# Patient Record
Sex: MALE | Race: WHITE | Employment: FULL TIME | URBAN - METROPOLITAN AREA
[De-identification: names, ages, dates, MRNs, and addresses within clinical notes are randomized per-mention and may not be internally consistent; named-entity substitution may affect disease eponyms.]

---

## 2021-04-20 ENCOUNTER — HOSPITAL ENCOUNTER (INPATIENT)
Age: 51
LOS: 16 days | Discharge: HOME | DRG: 177 | End: 2021-05-06
Attending: INTERNAL MEDICINE | Admitting: FAMILY MEDICINE
Payer: COMMERCIAL

## 2021-04-20 ENCOUNTER — APPOINTMENT (OUTPATIENT)
Dept: CT IMAGING | Age: 51
DRG: 177 | End: 2021-04-20
Attending: INTERNAL MEDICINE
Payer: COMMERCIAL

## 2021-04-20 ENCOUNTER — APPOINTMENT (OUTPATIENT)
Dept: GENERAL RADIOLOGY | Age: 51
End: 2021-04-20
Attending: STUDENT IN AN ORGANIZED HEALTH CARE EDUCATION/TRAINING PROGRAM
Payer: COMMERCIAL

## 2021-04-20 ENCOUNTER — HOSPITAL ENCOUNTER (EMERGENCY)
Age: 51
Discharge: ANOTHER HOSPITAL | End: 2021-04-20
Attending: STUDENT IN AN ORGANIZED HEALTH CARE EDUCATION/TRAINING PROGRAM
Payer: COMMERCIAL

## 2021-04-20 VITALS
TEMPERATURE: 98.5 F | OXYGEN SATURATION: 96 % | BODY MASS INDEX: 45.1 KG/M2 | WEIGHT: 315 LBS | HEIGHT: 70 IN | HEART RATE: 72 BPM | RESPIRATION RATE: 24 BRPM | DIASTOLIC BLOOD PRESSURE: 54 MMHG | SYSTOLIC BLOOD PRESSURE: 113 MMHG

## 2021-04-20 DIAGNOSIS — J12.82 PNEUMONIA DUE TO COVID-19 VIRUS: Primary | ICD-10-CM

## 2021-04-20 DIAGNOSIS — U07.1 PNEUMONIA DUE TO COVID-19 VIRUS: Primary | ICD-10-CM

## 2021-04-20 DIAGNOSIS — R09.02 HYPOXIA: ICD-10-CM

## 2021-04-20 PROBLEM — J96.90 RESPIRATORY FAILURE (HCC): Status: ACTIVE | Noted: 2021-04-20

## 2021-04-20 LAB
ALBUMIN SERPL-MCNC: 2.6 G/DL (ref 3.5–5)
ALBUMIN/GLOB SERPL: 0.6 {RATIO} (ref 1.1–2.2)
ALP SERPL-CCNC: 98 U/L (ref 45–117)
ALT SERPL-CCNC: 50 U/L (ref 12–78)
ANION GAP SERPL CALC-SCNC: 8 MMOL/L (ref 5–15)
APTT PPP: 31.7 SEC (ref 22.1–31)
ARTERIAL PATENCY WRIST A: YES
AST SERPL W P-5'-P-CCNC: 54 U/L (ref 15–37)
ATRIAL RATE: 60 BPM
BASE EXCESS BLD CALC-SCNC: 5 MMOL/L
BASOPHILS # BLD: 0 K/UL (ref 0–0.1)
BASOPHILS NFR BLD: 0 % (ref 0–1)
BDY SITE: ABNORMAL
BILIRUB SERPL-MCNC: 0.5 MG/DL (ref 0.2–1)
BNP SERPL-MCNC: 25 PG/ML
BUN SERPL-MCNC: 27 MG/DL (ref 6–20)
BUN/CREAT SERPL: 19 (ref 12–20)
CA-I BLD-MCNC: 8.7 MG/DL (ref 8.5–10.1)
CA-I BLD-SCNC: 1.09 MMOL/L (ref 1.12–1.32)
CALCULATED P AXIS, ECG09: 33 DEGREES
CALCULATED R AXIS, ECG10: 16 DEGREES
CALCULATED T AXIS, ECG11: 51 DEGREES
CHLORIDE SERPL-SCNC: 102 MMOL/L (ref 97–108)
CO2 SERPL-SCNC: 28 MMOL/L (ref 21–32)
CREAT SERPL-MCNC: 1.41 MG/DL (ref 0.7–1.3)
CRP SERPL HS-MCNC: >9.5 MG/L
CRP SERPL-MCNC: 17.8 MG/DL (ref 0–0.6)
D DIMER PPP FEU-MCNC: 0.61 MG/L FEU (ref 0–0.65)
D DIMER PPP FEU-MCNC: 0.79 UG/ML(FEU)
DIAGNOSIS, 93000: NORMAL
DIFFERENTIAL METHOD BLD: ABNORMAL
EOSINOPHIL # BLD: 0 K/UL (ref 0–0.4)
EOSINOPHIL NFR BLD: 0 % (ref 0–7)
ERYTHROCYTE [DISTWIDTH] IN BLOOD BY AUTOMATED COUNT: 13.9 % (ref 11.5–14.5)
FIBRINOGEN PPP-MCNC: >800 MG/DL (ref 200–475)
GAS FLOW.O2 O2 DELIVERY SYS: ABNORMAL L/MIN
GAS FLOW.O2 SETTING OXYMISER: 6.5 L/M
GLOBULIN SER CALC-MCNC: 4.5 G/DL (ref 2–4)
GLUCOSE SERPL-MCNC: 110 MG/DL (ref 65–100)
HCO3 BLD-SCNC: 29.7 MMOL/L (ref 22–26)
HCT VFR BLD AUTO: 41 % (ref 36.6–50.3)
HGB BLD-MCNC: 14.2 G/DL (ref 12.1–17)
IMM GRANULOCYTES # BLD AUTO: 0 K/UL (ref 0–0.04)
IMM GRANULOCYTES NFR BLD AUTO: 0 % (ref 0–0.5)
INR PPP: 1 (ref 0.9–1.1)
LACTATE SERPL-SCNC: 0.9 MMOL/L (ref 0.4–2)
LACTATE SERPL-SCNC: 1.8 MMOL/L (ref 0.4–2)
LDH SERPL L TO P-CCNC: 621 U/L (ref 85–241)
LYMPHOCYTES # BLD: 0.4 K/UL (ref 0.8–3.5)
LYMPHOCYTES NFR BLD: 6 % (ref 12–49)
MAGNESIUM SERPL-MCNC: 2.9 MG/DL (ref 1.6–2.4)
MCH RBC QN AUTO: 30 PG (ref 26–34)
MCHC RBC AUTO-ENTMCNC: 34.6 G/DL (ref 30–36.5)
MCV RBC AUTO: 86.5 FL (ref 80–99)
MONOCYTES # BLD: 0.2 K/UL (ref 0–1)
MONOCYTES NFR BLD: 2 % (ref 5–13)
NEUTS SEG # BLD: 6.2 K/UL (ref 1.8–8)
NEUTS SEG NFR BLD: 92 % (ref 32–75)
P-R INTERVAL, ECG05: 114 MS
PCO2 BLD: 44.1 MMHG (ref 35–45)
PH BLD: 7.44 [PH] (ref 7.35–7.45)
PLATELET # BLD AUTO: 202 K/UL (ref 150–400)
PMV BLD AUTO: 10.5 FL (ref 8.9–12.9)
PO2 BLD: 65 MMHG (ref 80–100)
POTASSIUM SERPL-SCNC: 3.3 MMOL/L (ref 3.5–5.1)
PROCALCITONIN SERPL-MCNC: 0.18 NG/ML
PROT SERPL-MCNC: 7.1 G/DL (ref 6.4–8.2)
PROTHROMBIN TIME: 10.4 SEC (ref 9–11.1)
Q-T INTERVAL, ECG07: 428 MS
QRS DURATION, ECG06: 90 MS
QTC CALCULATION (BEZET), ECG08: 428 MS
RBC # BLD AUTO: 4.74 M/UL (ref 4.1–5.7)
SAO2 % BLD: 93 % (ref 92–97)
SARS-COV-2, COV2: NORMAL
SODIUM SERPL-SCNC: 138 MMOL/L (ref 136–145)
SPECIMEN TYPE: ABNORMAL
THERAPEUTIC RANGE,PTTT: ABNORMAL SECS (ref 58–77)
TOTAL RESP. RATE, ITRR: 16
TROPONIN I SERPL-MCNC: <0.05 NG/ML
TROPONIN I SERPL-MCNC: <0.05 NG/ML
VENTRICULAR RATE, ECG03: 60 BPM
WBC # BLD AUTO: 6.8 K/UL (ref 4.1–11.1)

## 2021-04-20 PROCEDURE — 83615 LACTATE (LD) (LDH) ENZYME: CPT

## 2021-04-20 PROCEDURE — 74011250636 HC RX REV CODE- 250/636: Performed by: NURSE PRACTITIONER

## 2021-04-20 PROCEDURE — 36600 WITHDRAWAL OF ARTERIAL BLOOD: CPT

## 2021-04-20 PROCEDURE — 82803 BLOOD GASES ANY COMBINATION: CPT

## 2021-04-20 PROCEDURE — 36415 COLL VENOUS BLD VENIPUNCTURE: CPT

## 2021-04-20 PROCEDURE — 77010033678 HC OXYGEN DAILY

## 2021-04-20 PROCEDURE — 85384 FIBRINOGEN ACTIVITY: CPT

## 2021-04-20 PROCEDURE — 94660 CPAP INITIATION&MGMT: CPT

## 2021-04-20 PROCEDURE — 83735 ASSAY OF MAGNESIUM: CPT

## 2021-04-20 PROCEDURE — 94760 N-INVAS EAR/PLS OXIMETRY 1: CPT

## 2021-04-20 PROCEDURE — 86140 C-REACTIVE PROTEIN: CPT

## 2021-04-20 PROCEDURE — 71250 CT THORAX DX C-: CPT

## 2021-04-20 PROCEDURE — 74011250637 HC RX REV CODE- 250/637: Performed by: NURSE PRACTITIONER

## 2021-04-20 PROCEDURE — U0005 INFEC AGEN DETEC AMPLI PROBE: HCPCS

## 2021-04-20 PROCEDURE — 84484 ASSAY OF TROPONIN QUANT: CPT

## 2021-04-20 PROCEDURE — 85610 PROTHROMBIN TIME: CPT

## 2021-04-20 PROCEDURE — 83605 ASSAY OF LACTIC ACID: CPT

## 2021-04-20 PROCEDURE — 84145 PROCALCITONIN (PCT): CPT

## 2021-04-20 PROCEDURE — 93005 ELECTROCARDIOGRAM TRACING: CPT

## 2021-04-20 PROCEDURE — 85379 FIBRIN DEGRADATION QUANT: CPT

## 2021-04-20 PROCEDURE — 65660000000 HC RM CCU STEPDOWN

## 2021-04-20 PROCEDURE — 71045 X-RAY EXAM CHEST 1 VIEW: CPT

## 2021-04-20 PROCEDURE — 87040 BLOOD CULTURE FOR BACTERIA: CPT

## 2021-04-20 PROCEDURE — 74011000258 HC RX REV CODE- 258: Performed by: NURSE PRACTITIONER

## 2021-04-20 PROCEDURE — 86141 C-REACTIVE PROTEIN HS: CPT

## 2021-04-20 PROCEDURE — 80053 COMPREHEN METABOLIC PANEL: CPT

## 2021-04-20 PROCEDURE — 85025 COMPLETE CBC W/AUTO DIFF WBC: CPT

## 2021-04-20 PROCEDURE — 85730 THROMBOPLASTIN TIME PARTIAL: CPT

## 2021-04-20 PROCEDURE — 83880 ASSAY OF NATRIURETIC PEPTIDE: CPT

## 2021-04-20 PROCEDURE — 99285 EMERGENCY DEPT VISIT HI MDM: CPT

## 2021-04-20 RX ORDER — GUAIFENESIN/DEXTROMETHORPHAN 100-10MG/5
5 SYRUP ORAL
Status: DISCONTINUED | OUTPATIENT
Start: 2021-04-20 | End: 2021-05-06 | Stop reason: HOSPADM

## 2021-04-20 RX ORDER — ENOXAPARIN SODIUM 100 MG/ML
40 INJECTION SUBCUTANEOUS EVERY 12 HOURS
Status: DISCONTINUED | OUTPATIENT
Start: 2021-04-20 | End: 2021-05-06 | Stop reason: HOSPADM

## 2021-04-20 RX ORDER — ACETAMINOPHEN 325 MG/1
650 TABLET ORAL
Status: DISCONTINUED | OUTPATIENT
Start: 2021-04-20 | End: 2021-04-21 | Stop reason: SDUPTHER

## 2021-04-20 RX ORDER — ZINC SULFATE 50(220)MG
1 CAPSULE ORAL DAILY
Status: DISCONTINUED | OUTPATIENT
Start: 2021-04-21 | End: 2021-05-06 | Stop reason: HOSPADM

## 2021-04-20 RX ORDER — ALBUTEROL SULFATE 90 UG/1
2 AEROSOL, METERED RESPIRATORY (INHALATION)
Status: DISCONTINUED | OUTPATIENT
Start: 2021-04-20 | End: 2021-04-22

## 2021-04-20 RX ORDER — SODIUM CHLORIDE 0.9 % (FLUSH) 0.9 %
5-40 SYRINGE (ML) INJECTION AS NEEDED
Status: DISCONTINUED | OUTPATIENT
Start: 2021-04-20 | End: 2021-05-06 | Stop reason: HOSPADM

## 2021-04-20 RX ORDER — ACETAMINOPHEN 325 MG/1
650 TABLET ORAL
Status: DISCONTINUED | OUTPATIENT
Start: 2021-04-20 | End: 2021-05-06 | Stop reason: HOSPADM

## 2021-04-20 RX ORDER — SODIUM CHLORIDE 0.9 % (FLUSH) 0.9 %
5-40 SYRINGE (ML) INJECTION EVERY 8 HOURS
Status: DISCONTINUED | OUTPATIENT
Start: 2021-04-20 | End: 2021-05-06 | Stop reason: HOSPADM

## 2021-04-20 RX ORDER — MELATONIN
2000 DAILY
Status: DISCONTINUED | OUTPATIENT
Start: 2021-04-21 | End: 2021-05-06 | Stop reason: HOSPADM

## 2021-04-20 RX ORDER — ASCORBIC ACID 500 MG
500 TABLET ORAL 2 TIMES DAILY
Status: DISCONTINUED | OUTPATIENT
Start: 2021-04-20 | End: 2021-05-06 | Stop reason: HOSPADM

## 2021-04-20 RX ORDER — ACETAMINOPHEN 650 MG/1
650 SUPPOSITORY RECTAL
Status: DISCONTINUED | OUTPATIENT
Start: 2021-04-20 | End: 2021-05-06 | Stop reason: HOSPADM

## 2021-04-20 RX ADMIN — OXYCODONE HYDROCHLORIDE AND ACETAMINOPHEN 500 MG: 500 TABLET ORAL at 17:58

## 2021-04-20 RX ADMIN — DEXAMETHASONE 6 MG: 4 TABLET ORAL at 17:58

## 2021-04-20 RX ADMIN — ENOXAPARIN SODIUM 40 MG: 100 INJECTION SUBCUTANEOUS at 17:59

## 2021-04-20 RX ADMIN — Medication 10 ML: at 18:00

## 2021-04-20 RX ADMIN — Medication 10 ML: at 22:31

## 2021-04-20 RX ADMIN — CEFTRIAXONE SODIUM 1 G: 1 INJECTION, POWDER, FOR SOLUTION INTRAMUSCULAR; INTRAVENOUS at 19:40

## 2021-04-20 NOTE — CONSULTS
Name: [facility]: [address]   : 1970 Admit Date: 2021   Phone: [phone]  Room: [facility]   PCP: None  MRN: [id number]   Date: 2021  Code: Full Code        HPI:    5:44 PM       History was obtained from patient. I was asked by Ezequiel Thomas MD to see Ysabel Rodriguez in consultation for a chief complaint of shortness of breath. History of Present Illness: 48year old male with past medical history of asthma, allergic rhinitis and morbid obesity who presented to [facility] with increased shortness of breath. Diagnosed with COVID-19 1 week back at [facility]. Last fever 7 days back. Does have cough, shortness of breath and wheezing. On prednisone 20 mg q daily. Data:  Hgb 6.8  Wbc 14  Plt 202  D-dimer 0.79  Cr 1.41  Trop < 0.05  nt pro bnp 25  CRP pending. CXR - bilateral infiltrates. Past Medical History:   Diagnosis Date    Asthma     High cholesterol     Hypertension     Sleep apnea        Past Surgical History:   Procedure Laterality Date    HX HERNIA REPAIR      HX OPEN REDUCTION INTERNAL FIXATION      rt leg       No family history on file.     Social History     Tobacco Use    Smoking status: Never Smoker    Smokeless tobacco: Current User   Substance Use Topics    Alcohol use: Never     Frequency: Never       Allergies   Allergen Reactions    Penicillins Unknown (comments)       Current Facility-Administered Medications   Medication Dose Route Frequency    enoxaparin (LOVENOX) injection 40 mg  40 mg SubCUTAneous Q12H    acetaminophen (TYLENOL) tablet 650 mg  650 mg Oral Q6H PRN    Or    acetaminophen (TYLENOL) suppository 650 mg  650 mg Rectal Q6H PRN    guaiFENesin-dextromethorphan (ROBITUSSIN DM) 100-10 mg/5 mL syrup 5 mL  5 mL Oral Q4H PRN    dexAMETHasone (DECADRON) tablet 6 mg  6 mg Oral DAILY    [START ON 2021] cholecalciferol (VITAMIN D3) (1000 Units /25 mcg) tablet 2,000 Units  2,000 Units Oral DAILY    ascorbic acid (vitamin C) (VITAMIN C) tablet 500 mg  500 mg Oral BID    [START ON 4/21/2021] zinc sulfate (ZINCATE) 50 mg zinc (220 mg) capsule 1 Cap  1 Cap Oral DAILY    sodium chloride (NS) flush 5-40 mL  5-40 mL IntraVENous Q8H    sodium chloride (NS) flush 5-40 mL  5-40 mL IntraVENous PRN    cefTRIAXone (ROCEPHIN) 1 g in 0.9% sodium chloride (MBP/ADV) 50 mL MBP  1 g IntraVENous Q24H    azithromycin (ZITHROMAX) 500 mg in 0.9% sodium chloride 250 mL (VIAL-MATE)  500 mg IntraVENous Q24H    acetaminophen (TYLENOL) tablet 650 mg  650 mg Oral Q4H PRN    albuterol (PROVENTIL HFA, VENTOLIN HFA, PROAIR HFA) inhaler 2 Puff  2 Puff Inhalation Q4H PRN         REVIEW OF SYSTEMS   Negative except as stated in the HPI. Physical Exam:   Visit Vitals  BP 99/64 (BP Patient Position: Supine)   Pulse 66   Temp 98.2 °F (36.8 °C)   Resp 26   SpO2 95%     Patient has COVID-19 infection and examination was deferred. Only inspection was performed. General:  Alert, cooperative, no distress. Head:  Normocephalic. Eyes:  Conjunctivae/corneas clear. Nose: Nares normal. Septum midline. Extremities: Extremities normal, atraumatic, no cyanosis or edema. Skin: Skin color, texture, turgor normal. No rashes or lesions       Neurologic: Grossly nonfocal       Lab Results   Component Value Date/Time    Sodium 138 04/20/2021 10:31 AM    Potassium 3.3 (L) 04/20/2021 10:31 AM    Chloride 102 04/20/2021 10:31 AM    CO2 28 04/20/2021 10:31 AM    BUN 27 (H) 04/20/2021 10:31 AM    Creatinine 1.41 (H) 04/20/2021 10:31 AM    Glucose 110 (H) 04/20/2021 10:31 AM    Calcium 8.7 04/20/2021 10:31 AM    Lactic acid 1.8 04/20/2021 10:31 AM       Lab Results   Component Value Date/Time    WBC 6.8 04/20/2021 10:31 AM    HGB 14.2 04/20/2021 10:31 AM    PLATELET 474 13/69/1825 10:31 AM    MCV 86.5 04/20/2021 10:31 AM       Lab Results   Component Value Date/Time    Alk.  phosphatase 98 04/20/2021 10:31 AM    Protein, total 7.1 04/20/2021 10:31 AM    Albumin 2.6 (L) 04/20/2021 10:31 AM    Globulin 4.5 (H) 04/20/2021 10:31 AM       Lab Results   Component Value Date/Time    PHI 7.44 04/20/2021 04:50 PM    PCO2I 44.1 04/20/2021 04:50 PM    PO2I 65 (L) 04/20/2021 04:50 PM    HCO3I 29.7 (H) 04/20/2021 04:50 PM       Lab Results   Component Value Date/Time    Troponin-I, Qt. <0.05 04/20/2021 10:31 AM        IMPRESSION:  ===========  -COVID-19.  -COVID-19 pneumonia. -hypoxemic respiratory failure.  -asthma.  -seasonal allergies.  -Obesity. PLAN:  =====  -CRP / d-dimer daily.  -Decadron 6 mg q daily.  -dvt ppx.  -HRCT Chest.  -O2 supplementation.  -sleep prone > 3 hours. -PCT - if normal d/c abx. Thank you for the consult.     Mary Jo Leone MD

## 2021-04-20 NOTE — ED TRIAGE NOTES
COVID+ x 1 wk, pt having episodes of hypoxia since dx. Pt becoming increasingly SOB with cough, fever, fatigue. This am spo2 74% on RA. 85% on 6L. Placed on 10LPM with improvement to 96%.  PT HAS ANTHEM AND CANT BE ADMITTED AT [facility]

## 2021-04-20 NOTE — ED NOTES
Patient belongings black cell phone, gray cell phone , black wallet, glasses, and black shirt begged and with patient.  Handed bagged belongings to [facility]

## 2021-04-20 NOTE — H&P
[address] [facility]  History and Physical    Primary Care Provider: None  Date of Service:  4/20/2021    Subjective:     River Jain is a 48 y.o. male with past medical history of asthma, hypertension, hyperlipidemia and morbid obesity presented to [facility] ED this morning with increased shortness of breath. Patient states over the past 3 weeks his allergies have been bothering him, normally he is able to take steriods and then feel better but 1 week ago he had increased shortness of breath and went to the hospital.  At that time he was diagnosed with COVID-19 and since then his breathing has become worse. This morning when he woke up he felt like he could not breathe and went to the emergency department. Currently he is on 6 L nasal cannula and does not appear to be in distress. Patient was transferred to [facility] for further treatment and hospitalist consulted for admission. Denies syncope, dizziness, chest pain or tightness, nausea, vomiting or diarrhea. Review of Systems:    A comprehensive review of systems was negative except for that written in the History of Present Illness. Past Medical History:   Diagnosis Date    Asthma     High cholesterol     Hypertension     Sleep apnea       Past Surgical History:   Procedure Laterality Date    HX HERNIA REPAIR      HX OPEN REDUCTION INTERNAL FIXATION      rt leg     Prior to Admission medications    Not on File     Allergies   Allergen Reactions    Penicillins Unknown (comments)      No family history on file. SOCIAL HISTORY:  Patient resides at Home. Patient ambulates with Independent .    Smoking history: Denies   Alcohol history: Denies   Drug history: Denies        Objective:       Physical Exam:   Visit Vitals  BP 99/64 (BP Patient Position: Supine)   Pulse 66   Temp 98.2 °F (36.8 °C)   Resp 26   SpO2 95%     General appearance: alert, cooperative, no distress, appears stated age  Lungs: slight expiratory wheezing in right lobes, left lobes clear   Heart: regular rate and rhythm, S1, S2 normal, no murmur, click, rub or gallop  Abdomen: soft, non-tender. Bowel sounds normal. No masses,  no organomegaly  Extremities: extremities normal, atraumatic, no cyanosis or edema  Pulses: 2+ and symmetric  Skin: Skin color, texture, turgor normal. No rashes or lesions  Neurologic: Grossly normal  Cap refill: Brisk, less than 3 seconds  Pulses: 2+, symmetric in all extremities         Data Review: All diagnostic labs and studies have been reviewed. Chest x-ray: IMPRESSION  Patchy bilateral airspace disease may represent multilobar pneumonia in the  appropriate clinical setting. Assessment:     Active Problems:    Respiratory failure ([facility] [address] (4/20/2021)      COVID-19 (4/20/2021)        Plan:     Acute hypoxic respiratory failure secondary to COVID 19   Covid 19- Pneumonia   - Admit to telemetry   - Currently on 6L NC, titrate O2 to keep sats >92%   - Start dexamethasone, vit c, vit d, zinc  - Consult pharmacy for remdisivir- Per guidelines patient does not meet criteria. Consult ID if patient's condition warrants remdisivir  - Covid 19 PCR pending   - ABG and Labs pending    - Consult pulmonary   - Follow blood cultures   - IV abx     Hypertension   - Stable   - Consult pharmacy for medication rec     HLD   - Lipid panel pending   - Pharmacy for med rec. Morbid Obesity   - BMI 33.81 kg/m²  - Follow up outpatient for weight loss management. DVT prophylaxis: Lovenox  CODE STATUS: Full code     FUNCTIONAL STATUS PRIOR TO HOSPITALIZATION (including history of recent falls): Independent.       Signed By: Julianne Mcgovern NP     April 20, 2021

## 2021-04-20 NOTE — ED PROVIDER NOTES
EMERGENCY DEPARTMENT HISTORY AND PHYSICAL EXAM      Date: 4/20/2021  Patient Name: Cammie Iverson    History of Presenting Illness     Chief Complaint   Patient presents with    Shortness of Breath    Positive For Covid-19       History Provided By: Patient    HPI: Cammie Iverson, 48 y.o. male with a past medical history significant asthma presents to the ED with cc of shortness of breath. Patient was recently diagnosed with COVID-19, approximately 6 days ago, since that time has noted worsening shortness of breath, patient was discharged home on steroids, with pulse ox, family have noted pulse ox to be ranging from the 70s to 80s today. Patient has complained of worsening chest tightness, dyspnea on exertion. Denies any chest pain denies any abdominal pain nausea vomiting. Patient has noted to be febrile every day since diagnosis. There are no other complaints, changes, or physical findings at this time.     PCP: None    Facility-Administered Medications Ordered in Other Encounters   Medication Dose Route Frequency Provider Last Rate Last Admin    enoxaparin (LOVENOX) injection 40 mg  40 mg SubCUTAneous Q12H Izabela Mcclelland NP   40 mg at 04/20/21 1759    acetaminophen (TYLENOL) tablet 650 mg  650 mg Oral Q6H PRN Bishop Leavitt NP        Or    acetaminophen (TYLENOL) suppository 650 mg  650 mg Rectal Q6H PRN Jaxson Mcclelland NP        guaiFENesin-dextromethorphan (ROBITUSSIN DM) 100-10 mg/5 mL syrup 5 mL  5 mL Oral Q4H PRN Jaxson Mcclelland NP        dexAMETHasone (DECADRON) tablet 6 mg  6 mg Oral DAILY Izabela Mcclelland NP   6 mg at 04/20/21 1758    [START ON 4/21/2021] cholecalciferol (VITAMIN D3) (1000 Units /25 mcg) tablet 2,000 Units  2,000 Units Oral DAILY Jaxson Mcclelland NP        ascorbic acid (vitamin C) (VITAMIN C) tablet 500 mg  500 mg Oral BID Izabela Rodriguez NP   500 mg at 04/20/21 1758    [START ON 4/21/2021] zinc sulfate (ZINCATE) 50 mg zinc (220 mg) capsule 1 Cap  1 Cap Oral DAILY Earl Mcclelland, NP        sodium chloride (NS) flush 5-40 mL  5-40 mL IntraVENous Q8H Izabela Mcclelland NP   10 mL at 04/20/21 1800    sodium chloride (NS) flush 5-40 mL  5-40 mL IntraVENous PRN Jasmyn Maloney NP        cefTRIAXone (ROCEPHIN) 1 g in 0.9% sodium chloride (MBP/ADV) 50 mL MBP  1 g IntraVENous Q24H Izabela Mcclelland  mL/hr at 04/20/21 1940 1 g at 04/20/21 1940    azithromycin (ZITHROMAX) 500 mg in 0.9% sodium chloride 250 mL (VIAL-MATE)  500 mg IntraVENous Q24H Izabeal Mcclelland NP        acetaminophen (TYLENOL) tablet 650 mg  650 mg Oral Q4H PRN Earl Mcclelland NP        albuterol (PROVENTIL HFA, VENTOLIN HFA, PROAIR HFA) inhaler 2 Puff  2 Puff Inhalation Q4H PRN Earl Baca NP           Past History     Past Medical History:  Past Medical History:   Diagnosis Date    Asthma     High cholesterol     Hypertension     Sleep apnea        Past Surgical History:  Past Surgical History:   Procedure Laterality Date    HX HERNIA REPAIR      HX OPEN REDUCTION INTERNAL FIXATION      rt leg       Family History:  History reviewed. No pertinent family history. Social History:  Social History     Tobacco Use    Smoking status: Never Smoker    Smokeless tobacco: Current User   Substance Use Topics    Alcohol use: Never     Frequency: Never    Drug use: Not on file       Allergies: Allergies   Allergen Reactions    Penicillins Unknown (comments)         Review of Systems     Review of Systems   Constitutional: Positive for chills, fatigue and fever. Negative for activity change. HENT: Negative for congestion and sore throat. Eyes: Negative for photophobia and visual disturbance. Respiratory: Positive for cough, chest tightness and shortness of breath. Negative for apnea. Cardiovascular: Negative for chest pain, palpitations and leg swelling. Gastrointestinal: Negative for abdominal pain, blood in stool, nausea and vomiting.    Genitourinary: Negative for dysuria. Musculoskeletal: Negative for arthralgias and back pain. Skin: Negative for color change. Neurological: Negative for dizziness, weakness, numbness and headaches. Psychiatric/Behavioral: Negative for confusion. Physical Exam     Physical Exam  Vitals signs and nursing note reviewed. Constitutional:       General: He is not in acute distress. Appearance: He is well-developed. He is obese. He is not toxic-appearing. HENT:      Head: Normocephalic and atraumatic. Eyes:      Extraocular Movements: Extraocular movements intact. Pupils: Pupils are equal, round, and reactive to light. Neck:      Musculoskeletal: Neck supple. Cardiovascular:      Rate and Rhythm: Normal rate and regular rhythm. Pulses:           Radial pulses are 2+ on the right side and 2+ on the left side. Dorsalis pedis pulses are 2+ on the right side and 2+ on the left side. Heart sounds: Normal heart sounds. Pulmonary:      Effort: Pulmonary effort is normal.      Breath sounds: Normal breath sounds. Chest:      Chest wall: No tenderness. Abdominal:      General: Bowel sounds are normal.      Palpations: Abdomen is soft. Tenderness: There is no abdominal tenderness. There is no guarding. Musculoskeletal:      Right lower leg: No edema. Left lower leg: No edema. Skin:     General: Skin is warm and dry. Capillary Refill: Capillary refill takes less than 2 seconds. Neurological:      General: No focal deficit present. Mental Status: He is alert and oriented to person, place, and time.          Diagnostic Study Results     Labs -     Recent Results (from the past 12 hour(s))   D DIMER    Collection Time: 04/20/21 10:30 AM   Result Value Ref Range    D DIMER 0.79 (H) <0.50 ug/ml(FEU)   CBC WITH AUTOMATED DIFF    Collection Time: 04/20/21 10:31 AM   Result Value Ref Range    WBC 6.8 4.1 - 11.1 K/uL    RBC 4.74 4.10 - 5.70 M/uL    HGB 14.2 12.1 - 17.0 g/dL    HCT 41.0 36.6 - 50.3 %    MCV 86.5 80.0 - 99.0 FL    MCH 30.0 26.0 - 34.0 PG    MCHC 34.6 30.0 - 36.5 g/dL    RDW 13.9 11.5 - 14.5 %    PLATELET 854 754 - 472 K/uL    MPV 10.5 8.9 - 12.9 FL    NEUTROPHILS 92 (H) 32 - 75 %    LYMPHOCYTES 6 (L) 12 - 49 %    MONOCYTES 2 (L) 5 - 13 %    EOSINOPHILS 0 0 - 7 %    BASOPHILS 0 0 - 1 %    IMMATURE GRANULOCYTES 0 0.0 - 0.5 %    ABS. NEUTROPHILS 6.2 1.8 - 8.0 K/UL    ABS. LYMPHOCYTES 0.4 (L) 0.8 - 3.5 K/UL    ABS. MONOCYTES 0.2 0.0 - 1.0 K/UL    ABS. EOSINOPHILS 0.0 0.0 - 0.4 K/UL    ABS. BASOPHILS 0.0 0.0 - 0.1 K/UL    ABS. IMM. GRANS. 0.0 0.00 - 0.04 K/UL    DF AUTOMATED     METABOLIC PANEL, COMPREHENSIVE    Collection Time: 04/20/21 10:31 AM   Result Value Ref Range    Sodium 138 136 - 145 mmol/L    Potassium 3.3 (L) 3.5 - 5.1 mmol/L    Chloride 102 97 - 108 mmol/L    CO2 28 21 - 32 mmol/L    Anion gap 8 5 - 15 mmol/L    Glucose 110 (H) 65 - 100 mg/dL    BUN 27 (H) 6 - 20 mg/dL    Creatinine 1.41 (H) 0.70 - 1.30 mg/dL    BUN/Creatinine ratio 19 12 - 20      GFR est AA >60 >60 ml/min/1.73m2    GFR est non-AA 53 (L) >60 ml/min/1.73m2    Calcium 8.7 8.5 - 10.1 mg/dL    Bilirubin, total 0.5 0.2 - 1.0 mg/dL    AST (SGOT) 54 (H) 15 - 37 U/L    ALT (SGPT) 50 12 - 78 U/L    Alk.  phosphatase 98 45 - 117 U/L    Protein, total 7.1 6.4 - 8.2 g/dL    Albumin 2.6 (L) 3.5 - 5.0 g/dL    Globulin 4.5 (H) 2.0 - 4.0 g/dL    A-G Ratio 0.6 (L) 1.1 - 2.2     LACTIC ACID    Collection Time: 04/20/21 10:31 AM   Result Value Ref Range    Lactic acid 1.8 0.4 - 2.0 mmol/L   TROPONIN I    Collection Time: 04/20/21 10:31 AM   Result Value Ref Range    Troponin-I, Qt. <0.05 <0.05 ng/mL   BNP    Collection Time: 04/20/21 10:31 AM   Result Value Ref Range    NT pro-BNP 25 <125 pg/mL   CRP, HIGH SENSITIVITY    Collection Time: 04/20/21 10:31 AM   Result Value Ref Range    CRP, High sensitivity >9.5 mg/L   POC EG7    Collection Time: 04/20/21  4:50 PM   Result Value Ref Range    Calcium, ionized (POC) 1.09 (L) 1.12 - 1.32 mmol/L    pH (POC) 7.44 7.35 - 7.45      pCO2 (POC) 44.1 35.0 - 45.0 MMHG    pO2 (POC) 65 (L) 80 - 100 MMHG    HCO3 (POC) 29.7 (H) 22 - 26 MMOL/L    Base excess (POC) 5 mmol/L    sO2 (POC) 93 92 - 97 %    Site LEFT RADIAL      Device: NASAL CANNULA      Flow rate (POC) 6.5 L/M    Allens test (POC) YES      Specimen type (POC) ARTERIAL      Total resp.  rate 16     EKG, 12 LEAD, INITIAL    Collection Time: 04/20/21  5:04 PM   Result Value Ref Range    Ventricular Rate 60 BPM    Atrial Rate 60 BPM    P-R Interval 114 ms    QRS Duration 90 ms    Q-T Interval 428 ms    QTC Calculation (Bezet) 428 ms    Calculated P Axis 33 degrees    Calculated R Axis 16 degrees    Calculated T Axis 51 degrees    Diagnosis       Normal sinus rhythm  Nonspecific T wave abnormality  No previous ECGs available  Confirmed by Miroslava Montalvo M.D., Anastasia Ike ([id number]) on 4/20/2021 6:06:00 PM     MAGNESIUM    Collection Time: 04/20/21  7:12 PM   Result Value Ref Range    Magnesium 2.9 (H) 1.6 - 2.4 mg/dL   PROTHROMBIN TIME + INR    Collection Time: 04/20/21  7:12 PM   Result Value Ref Range    INR 1.0 0.9 - 1.1      Prothrombin time 10.4 9.0 - 11.1 sec   FIBRINOGEN    Collection Time: 04/20/21  7:12 PM   Result Value Ref Range    Fibrinogen >800 (H) 200 - 475 mg/dL   PTT    Collection Time: 04/20/21  7:12 PM   Result Value Ref Range    aPTT 31.7 (H) 22.1 - 31.0 sec    aPTT, therapeutic range     58.0 - 77.0 SECS   PROCALCITONIN    Collection Time: 04/20/21  7:12 PM   Result Value Ref Range    Procalcitonin 0.18 ng/mL   C REACTIVE PROTEIN, QT    Collection Time: 04/20/21  7:12 PM   Result Value Ref Range    C-Reactive protein 17.80 (H) 0.00 - 0.60 mg/dL   D DIMER    Collection Time: 04/20/21  7:12 PM   Result Value Ref Range    D-dimer 0.61 0.00 - 0.65 mg/L FEU   LD    Collection Time: 04/20/21  7:12 PM   Result Value Ref Range     (H) 85 - 241 U/L   TROPONIN I    Collection Time: 04/20/21  7:12 PM   Result Value Ref Range    Troponin-I, Qt. <0.05 <0.05 ng/mL   LACTIC ACID    Collection Time: 04/20/21  7:12 PM   Result Value Ref Range    Lactic acid 0.9 0.4 - 2.0 MMOL/L   SARS-COV-2    Collection Time: 04/20/21  7:48 PM   Result Value Ref Range    SARS-CoV-2 Please find results under separate order         Radiologic Studies -   [unfilled]  CT Results  (Last 48 hours)    None        CXR Results  (Last 48 hours)               04/20/21 1058  XR CHEST PORT Final result    Impression:  Patchy bilateral airspace disease may represent multilobar pneumonia in the   appropriate clinical setting. Radiographic follow-up recommended. Narrative:  Clinical history: Shortness of breath       Comparison: None. Findings:    Single view chest. Cardiac monitoring leads project over the patient. Lung   volumes are adequate. Bilateral patchy perihilar airspace opacities are noted. No pleural effusion or pneumothorax. The cardiac silhouette is magnified due to   AP technique. Thoracic degenerative disc disease. Medical Decision Making and ED Course   I am the first provider for this patient. I reviewed the vital signs, available nursing notes, past medical history, past surgical history, family history and social history. Vital Signs-Reviewed the patient's vital signs.   Patient Vitals for the past 12 hrs:   Temp Pulse Resp BP SpO2   04/20/21 1249  72 24 (!) 113/54 96 %   04/20/21 1129     95 %   04/20/21 1029     92 %   04/20/21 1015     92 %   04/20/21 1011 98.5 °F (36.9 °C)       04/20/21 1009  85 (!) 34 (!) 128/59 (!) 89 %   04/20/21 1000     93 %       EKG interpretation: (Preliminary)  Normal sinus rhythm 89, no ST elevation, normal axis    Records Reviewed: Nursing Notes    The patient presents with cough, shortness of breath with a differential diagnosis of       Provider Notes (Medical Decision Making):   MDM   43-year-old male, past medical history of asthma, obesity, approximately 1 week after being diagnosed with Covid, presents to emergency department for shortness of breath, noted to be hypoxic at home in the 70s. Patient oxygenating at 95-96% on 6 L of oxygen, physical exam shows obese male, in no distress, mild tachypnea 20-30,clear reath sounds    Septic work-up initiated, continue oxygen support, chest x-ray ordered    ED Course:   Initial assessment performed. The patients presenting problems have been discussed, and they are in agreement with the care plan formulated and outlined with them. I have encouraged them to ask questions as they arise throughout their visit. ED Course as of Apr 20 2029   Tue Apr 20, 2021   1201 Patient saturating 92 to 95% on 6 L of oxygen,Patient's lab work resulted, cardiac enzymes are negative x1, CBC shows no leukocytosis, metabolic panel shows mild renal insufficiency creatinine 1.4, BUN 27. Patient's chest x-ray resulted shows diffuse bilateral patchy infiltrates, consistent with COVID-19 pneumonitis. [PZ]   5662 Discussed case with [facility] hospitalist, Dr. Hanna Pierce, accepts patient as transfer for Covid pneumonitis, hypoxia. Transfer center will set up transport. [PZ]   1649 Patient's D-dimer resulted, mildly elevated at 0.79, do not strongly suspect PE, Covid pneumonitis most likely causing hypoxia and symptoms, additionally patient's creatinine slightly elevated 1.4 at this point do not feel that risking renal injury giving CT with IV contrast is warranted as most likely  Covid pneumonitis iscausing hypoxia. [PZ]      ED Course User Index  [PZ] Maurilio Esparza MD         Procedures       Miguel Lemus MD  Procedures         Disposition     Transfer to [facility]          Diagnosis     Clinical Impression:   1. Pneumonia due to COVID-19 virus    2. Hypoxia        Attestations:    Miguel Lemus MD    Please note that this dictation was completed with [employer], the [email or web address] voice recognition software.   Quite often unanticipated grammatical, syntax, homophones, and other interpretive errors are inadvertently transcribed by the computer software. Please disregard these errors. Please excuse any errors that have escaped final proofreading. Thank you.

## 2021-04-21 LAB
25(OH)D3 SERPL-MCNC: 14.4 NG/ML (ref 30–100)
ABO + RH BLD: NORMAL
ALBUMIN SERPL-MCNC: 2.5 G/DL (ref 3.5–5)
ALBUMIN/GLOB SERPL: 0.5 {RATIO} (ref 1.1–2.2)
ALP SERPL-CCNC: 98 U/L (ref 45–117)
ALT SERPL-CCNC: 50 U/L (ref 12–78)
ANION GAP SERPL CALC-SCNC: 7 MMOL/L (ref 5–15)
APPEARANCE UR: CLEAR
AST SERPL-CCNC: 57 U/L (ref 15–37)
ATRIAL RATE: 89 BPM
BACTERIA URNS QL MICRO: NEGATIVE /HPF
BILIRUB DIRECT SERPL-MCNC: 0.2 MG/DL (ref 0–0.2)
BILIRUB SERPL-MCNC: 0.4 MG/DL (ref 0.2–1)
BILIRUB UR QL: NEGATIVE
BLOOD GROUP ANTIBODIES SERPL: NORMAL
BNP SERPL-MCNC: 16 PG/ML
BUN SERPL-MCNC: 30 MG/DL (ref 6–20)
BUN/CREAT SERPL: 29 (ref 12–20)
CALCIUM SERPL-MCNC: 8.9 MG/DL (ref 8.5–10.1)
CALCULATED P AXIS, ECG09: 33 DEGREES
CALCULATED R AXIS, ECG10: 28 DEGREES
CALCULATED T AXIS, ECG11: 113 DEGREES
CHLORIDE SERPL-SCNC: 105 MMOL/L (ref 97–108)
CO2 SERPL-SCNC: 27 MMOL/L (ref 21–32)
COLOR UR: ABNORMAL
COMMENT, HOLDF: NORMAL
COMMENT, HOLDF: NORMAL
CREAT SERPL-MCNC: 1.03 MG/DL (ref 0.7–1.3)
CRP SERPL-MCNC: 12.5 MG/DL (ref 0–0.6)
D DIMER PPP FEU-MCNC: 0.58 MG/L FEU (ref 0–0.65)
DIAGNOSIS, 93000: NORMAL
EPITH CASTS URNS QL MICRO: ABNORMAL /LPF
EST. AVERAGE GLUCOSE BLD GHB EST-MCNC: 120 MG/DL
GLOBULIN SER CALC-MCNC: 4.8 G/DL (ref 2–4)
GLUCOSE BLD STRIP.AUTO-MCNC: 128 MG/DL (ref 65–100)
GLUCOSE BLD STRIP.AUTO-MCNC: 140 MG/DL (ref 65–100)
GLUCOSE SERPL-MCNC: 124 MG/DL (ref 65–100)
GLUCOSE UR STRIP.AUTO-MCNC: NEGATIVE MG/DL
HBA1C MFR BLD: 5.8 % (ref 4–5.6)
HGB UR QL STRIP: NEGATIVE
KETONES UR QL STRIP.AUTO: NEGATIVE MG/DL
LEUKOCYTE ESTERASE UR QL STRIP.AUTO: NEGATIVE
MUCOUS THREADS URNS QL MICRO: ABNORMAL /LPF
NITRITE UR QL STRIP.AUTO: NEGATIVE
P-R INTERVAL, ECG05: 106 MS
PH UR STRIP: 5 [PH] (ref 5–8)
POTASSIUM SERPL-SCNC: 3.7 MMOL/L (ref 3.5–5.1)
PROT SERPL-MCNC: 7.3 G/DL (ref 6.4–8.2)
PROT UR STRIP-MCNC: ABNORMAL MG/DL
Q-T INTERVAL, ECG07: 328 MS
QRS DURATION, ECG06: 88 MS
QTC CALCULATION (BEZET), ECG08: 399 MS
RBC #/AREA URNS HPF: ABNORMAL /HPF (ref 0–5)
SAMPLES BEING HELD,HOLD: NORMAL
SAMPLES BEING HELD,HOLD: NORMAL
SARS-COV-2, XPLCVT: DETECTED
SERVICE CMNT-IMP: ABNORMAL
SERVICE CMNT-IMP: ABNORMAL
SODIUM SERPL-SCNC: 139 MMOL/L (ref 136–145)
SOURCE, COVRS: ABNORMAL
SP GR UR REFRACTOMETRY: 1.03 (ref 1–1.03)
SPECIMEN EXP DATE BLD: NORMAL
UROBILINOGEN UR QL STRIP.AUTO: 0.2 EU/DL (ref 0.2–1)
VENTRICULAR RATE, ECG03: 89 BPM
WBC URNS QL MICRO: ABNORMAL /HPF (ref 0–4)

## 2021-04-21 PROCEDURE — 74011636637 HC RX REV CODE- 636/637: Performed by: HOSPITALIST

## 2021-04-21 PROCEDURE — 83880 ASSAY OF NATRIURETIC PEPTIDE: CPT

## 2021-04-21 PROCEDURE — 86140 C-REACTIVE PROTEIN: CPT

## 2021-04-21 PROCEDURE — 74011250636 HC RX REV CODE- 250/636: Performed by: HOSPITALIST

## 2021-04-21 PROCEDURE — 85379 FIBRIN DEGRADATION QUANT: CPT

## 2021-04-21 PROCEDURE — 36415 COLL VENOUS BLD VENIPUNCTURE: CPT

## 2021-04-21 PROCEDURE — 80048 BASIC METABOLIC PNL TOTAL CA: CPT

## 2021-04-21 PROCEDURE — 74011000258 HC RX REV CODE- 258: Performed by: PHYSICIAN ASSISTANT

## 2021-04-21 PROCEDURE — 74011250636 HC RX REV CODE- 250/636: Performed by: PHYSICIAN ASSISTANT

## 2021-04-21 PROCEDURE — 80076 HEPATIC FUNCTION PANEL: CPT

## 2021-04-21 PROCEDURE — 82962 GLUCOSE BLOOD TEST: CPT

## 2021-04-21 PROCEDURE — 74011250636 HC RX REV CODE- 250/636: Performed by: NURSE PRACTITIONER

## 2021-04-21 PROCEDURE — 86901 BLOOD TYPING SEROLOGIC RH(D): CPT

## 2021-04-21 PROCEDURE — 83036 HEMOGLOBIN GLYCOSYLATED A1C: CPT

## 2021-04-21 PROCEDURE — 65660000001 HC RM ICU INTERMED STEPDOWN

## 2021-04-21 PROCEDURE — 82306 VITAMIN D 25 HYDROXY: CPT

## 2021-04-21 PROCEDURE — 94660 CPAP INITIATION&MGMT: CPT

## 2021-04-21 PROCEDURE — 77010033678 HC OXYGEN DAILY

## 2021-04-21 PROCEDURE — 81001 URINALYSIS AUTO W/SCOPE: CPT

## 2021-04-21 PROCEDURE — 74011250637 HC RX REV CODE- 250/637: Performed by: NURSE PRACTITIONER

## 2021-04-21 RX ORDER — VALSARTAN AND HYDROCHLOROTHIAZIDE 320; 25 MG/1; MG/1
TABLET, FILM COATED ORAL
COMMUNITY
Start: 2021-03-20

## 2021-04-21 RX ORDER — ALBUTEROL SULFATE 90 UG/1
AEROSOL, METERED RESPIRATORY (INHALATION)
COMMUNITY
Start: 2021-04-12

## 2021-04-21 RX ORDER — DEXAMETHASONE 4 MG/1
6 TABLET ORAL EVERY 12 HOURS
Status: DISCONTINUED | OUTPATIENT
Start: 2021-04-21 | End: 2021-04-22

## 2021-04-21 RX ORDER — CITALOPRAM 40 MG/1
TABLET, FILM COATED ORAL
COMMUNITY
Start: 2021-02-05

## 2021-04-21 RX ORDER — MONTELUKAST SODIUM 10 MG/1
TABLET ORAL
COMMUNITY
Start: 2021-03-16

## 2021-04-21 RX ORDER — MAGNESIUM SULFATE 100 %
4 CRYSTALS MISCELLANEOUS AS NEEDED
Status: DISCONTINUED | OUTPATIENT
Start: 2021-04-21 | End: 2021-05-06 | Stop reason: HOSPADM

## 2021-04-21 RX ORDER — ATORVASTATIN CALCIUM 20 MG/1
TABLET, FILM COATED ORAL
COMMUNITY
Start: 2021-03-16

## 2021-04-21 RX ORDER — CITALOPRAM 20 MG/1
40 TABLET, FILM COATED ORAL DAILY
Status: DISCONTINUED | OUTPATIENT
Start: 2021-04-22 | End: 2021-05-06 | Stop reason: HOSPADM

## 2021-04-21 RX ORDER — INSULIN LISPRO 100 [IU]/ML
INJECTION, SOLUTION INTRAVENOUS; SUBCUTANEOUS
Status: DISCONTINUED | OUTPATIENT
Start: 2021-04-21 | End: 2021-05-06 | Stop reason: HOSPADM

## 2021-04-21 RX ORDER — DEXTROSE 50 % IN WATER (D50W) INTRAVENOUS SYRINGE
12.5-25 AS NEEDED
Status: DISCONTINUED | OUTPATIENT
Start: 2021-04-21 | End: 2021-05-06 | Stop reason: HOSPADM

## 2021-04-21 RX ADMIN — AZITHROMYCIN MONOHYDRATE 500 MG: 500 INJECTION, POWDER, LYOPHILIZED, FOR SOLUTION INTRAVENOUS at 00:15

## 2021-04-21 RX ADMIN — DEXAMETHASONE 6 MG: 4 TABLET ORAL at 22:10

## 2021-04-21 RX ADMIN — Medication 1 CAPSULE: at 09:24

## 2021-04-21 RX ADMIN — Medication 10 ML: at 22:11

## 2021-04-21 RX ADMIN — DEXAMETHASONE 6 MG: 4 TABLET ORAL at 09:24

## 2021-04-21 RX ADMIN — OXYCODONE HYDROCHLORIDE AND ACETAMINOPHEN 500 MG: 500 TABLET ORAL at 09:24

## 2021-04-21 RX ADMIN — INSULIN LISPRO 2 UNITS: 100 INJECTION, SOLUTION INTRAVENOUS; SUBCUTANEOUS at 17:15

## 2021-04-21 RX ADMIN — Medication 10 ML: at 14:08

## 2021-04-21 RX ADMIN — Medication 2000 UNITS: at 09:24

## 2021-04-21 RX ADMIN — TOCILIZUMAB 800 MG: 20 INJECTION, SOLUTION, CONCENTRATE INTRAVENOUS at 14:07

## 2021-04-21 RX ADMIN — ENOXAPARIN SODIUM 40 MG: 100 INJECTION SUBCUTANEOUS at 03:49

## 2021-04-21 RX ADMIN — ENOXAPARIN SODIUM 40 MG: 100 INJECTION SUBCUTANEOUS at 16:05

## 2021-04-21 RX ADMIN — OXYCODONE HYDROCHLORIDE AND ACETAMINOPHEN 500 MG: 500 TABLET ORAL at 17:15

## 2021-04-21 RX ADMIN — Medication 10 ML: at 05:14

## 2021-04-21 NOTE — PROGRESS NOTES
[address] [facility]                                                                                          Hospitalist Progress Note  Edilberto Marie MD  Answering service: [phone] from in house phone        Date of Service:  2021  NAME:  Uli Ricardo  :  1970  MRN:  [id number]      Admission Summary:   48 y.o. male with past medical history of asthma, hypertension, hyperlipidemia and morbid obesity presented to [facility] ED this morning with increased shortness of breath. Interval history / Subjective:   Patient seen and examined    He complained of cough,some shortness of breath      Assessment & Plan:     # Acute hypoxemic respiratory failure  # COVID 19 pneumonia  -currently on 8 lt -on mid flow,transfer to [facility] for closer monitoring  -on decadron, discussed with -recommended 6 mg BID  -pulmonologist following- patient received actemra.  -He is not a candidate for remdesivir per pharmacy based on our facility protocol as he is greater than 7 days of symptoms (discussed with ID team as well who deferred the decision to pulmonary team,discussed with pharmacist again in the evening that pulmonary team recommended remdesivir,per pharmacist -per protocol they are unable to dispense it as patient's symptoms are >7 days). -low procal,does not need abx      HTN:-BP wnl, hold antihypertensives  Super morbid obesity: BMI 53  Depression: celexa  NICHO:CPAP      Code status: full  DVT prophylaxis: lovenox    Care Plan discussed with: patient seen and examined  Anticipated Disposition:tbd  Anticipated Discharge: tbd     Hospital Problems  Never Reviewed          Codes Class Noted POA    Respiratory failure ([facility] [address] ICD-10-CM: J96.90  ICD-9-CM: 518.81  2021 Unknown        COVID-19 ICD-10-CM: U07.1  ICD-9-CM: 079.89  2021 Unknown                Review of Systems:   Pertinent items are noted in HPI.        Vital Signs:    Last 24hrs VS reviewed since prior progress note. Most recent are:  Visit Vitals  /78 (BP 1 Location: Left upper arm, BP Patient Position: Sitting)   Pulse 69   Temp 98.7 °F (37.1 °C)   Resp 20   SpO2 90%       No intake or output data in the 24 hours ending 04/21/21 1925     Physical Examination:     I had a face to face encounter with this patient and independently examined them on 4/21/2021 as outlined below:          Constitutional:  No acute distress, cooperative, pleasant    ENT:  Oral mucosa moist, oropharynx benign. Resp:  decreased breath sounds b/l, no wheezing,no accessory muscle use   CV:  Regular rhythm, normal rate, S1,S2 wnl    GI:  Soft, non distended, obese    Musculoskeletal:  No LE edema    Neurologic:  Moves all extremities. AAOx3     Psych:  not anxious nor agitated. Skin: no rashes or ulcers       Data Review:    Review and/or order of clinical lab test  Review and/or order of tests in the radiology section of CPT  Review and/or order of tests in the medicine section of CPT      Labs:     Recent Labs     04/20/21  1031   WBC 6.8   HGB 14.2   HCT 41.0        Recent Labs     04/21/21  1409 04/20/21 1912 04/20/21  1031     --  138   K 3.7  --  3.3*     --  102   CO2 27  --  28   BUN 30*  --  27*   CREA 1.03  --  1.41*   *  --  110*   CA 8.9  --  8.7   MG  --  2.9*  --      Recent Labs     04/21/21  1409 04/20/21  1031   ALT 50 50   AP 98 98   TBILI 0.4 0.5   TP 7.3 7.1   ALB 2.5* 2.6*   GLOB 4.8* 4.5*     Recent Labs     04/20/21 1912   INR 1.0   PTP 10.4   APTT 31.7*      No results for input(s): FE, TIBC, PSAT, FERR in the last 72 hours. No results found for: FOL, RBCF   No results for input(s): PH, PCO2, PO2 in the last 72 hours.   Recent Labs     04/20/21 1912 04/20/21  1031   TROIQ <0.05 <0.05     No results found for: CHOL, CHOLX, CHLST, CHOLV, HDL, HDLP, LDL, LDLC, DLDLP, TGLX, TRIGL, TRIGP, CHHD, CHHDX  Lab Results   Component Value Date/Time    Glucose (POC) 140 (H) 04/21/2021 04:42 PM     Lab Results   Component Value Date/Time    Color YELLOW/STRAW 04/21/2021 04:16 AM    Appearance CLEAR 04/21/2021 04:16 AM    Specific gravity 1.030 04/21/2021 04:16 AM    pH (UA) 5.0 04/21/2021 04:16 AM    Protein TRACE (A) 04/21/2021 04:16 AM    Glucose Negative 04/21/2021 04:16 AM    Ketone Negative 04/21/2021 04:16 AM    Bilirubin Negative 04/21/2021 04:16 AM    Urobilinogen 0.2 04/21/2021 04:16 AM    Nitrites Negative 04/21/2021 04:16 AM    Leukocyte Esterase Negative 04/21/2021 04:16 AM    Epithelial cells FEW 04/21/2021 04:16 AM    Bacteria Negative 04/21/2021 04:16 AM    WBC 0-4 04/21/2021 04:16 AM    RBC 0-5 04/21/2021 04:16 AM         Medications Reviewed:     Current Facility-Administered Medications   Medication Dose Route Frequency    dexAMETHasone (DECADRON) tablet 6 mg  6 mg Oral Q12H    insulin lispro (HUMALOG) injection   SubCUTAneous AC&HS    glucose chewable tablet 16 g  4 Tab Oral PRN    dextrose (D50W) injection syrg 12.5-25 g  12.5-25 g IntraVENous PRN    glucagon (GLUCAGEN) injection 1 mg  1 mg IntraMUSCular PRN    enoxaparin (LOVENOX) injection 40 mg  40 mg SubCUTAneous Q12H    acetaminophen (TYLENOL) tablet 650 mg  650 mg Oral Q6H PRN    Or    acetaminophen (TYLENOL) suppository 650 mg  650 mg Rectal Q6H PRN    guaiFENesin-dextromethorphan (ROBITUSSIN DM) 100-10 mg/5 mL syrup 5 mL  5 mL Oral Q4H PRN    cholecalciferol (VITAMIN D3) (1000 Units /25 mcg) tablet 2,000 Units  2,000 Units Oral DAILY    ascorbic acid (vitamin C) (VITAMIN C) tablet 500 mg  500 mg Oral BID    zinc sulfate (ZINCATE) 50 mg zinc (220 mg) capsule 1 Cap  1 Cap Oral DAILY    sodium chloride (NS) flush 5-40 mL  5-40 mL IntraVENous Q8H    sodium chloride (NS) flush 5-40 mL  5-40 mL IntraVENous PRN    albuterol (PROVENTIL HFA, VENTOLIN HFA, PROAIR HFA) inhaler 2 Puff  2 Puff Inhalation Q4H PRN     ______________________________________________________________________  EXPECTED LENGTH OF STAY: 5d 9h  ACTUAL LENGTH OF STAY:          1                 Gabi Chavarria MD

## 2021-04-21 NOTE — PROGRESS NOTES
Name: [facility] MAGDALENA   : 1970 Admit Date: 2021   Phone: [phone]  Room: [facility]   PCP: None  MRN: [id number]   Date: 2021  Code: Full Code        HPI:      CRP 17.8  Still on 6lpm  Feels about the same   Day 7 of dx       5:44 PM       History was obtained from patient. I was asked by Kristen Rodriguez MD to see Mendoza Paez in consultation for a chief complaint of shortness of breath. History of Present Illness: 48year old male with past medical history of asthma, allergic rhinitis and morbid obesity who presented to [facility] with increased shortness of breath. Diagnosed with COVID-19 1 week back at [facility]. Last fever 7 days back. Does have cough, shortness of breath and wheezing. On prednisone 20 mg q daily. Data:  Hgb 6.8  Wbc 14  Plt 202  D-dimer 0.79  Cr 1.41  Trop < 0.05  nt pro bnp 25  CRP pending. CXR - bilateral infiltrates. Past Medical History:   Diagnosis Date    Asthma     High cholesterol     Hypertension     Sleep apnea        Past Surgical History:   Procedure Laterality Date    HX HERNIA REPAIR      HX OPEN REDUCTION INTERNAL FIXATION      rt leg       No family history on file.     Social History     Tobacco Use    Smoking status: Never Smoker    Smokeless tobacco: Current User   Substance Use Topics    Alcohol use: Never     Frequency: Never       Allergies   Allergen Reactions    Penicillins Unknown (comments)       Current Facility-Administered Medications   Medication Dose Route Frequency    enoxaparin (LOVENOX) injection 40 mg  40 mg SubCUTAneous Q12H    acetaminophen (TYLENOL) tablet 650 mg  650 mg Oral Q6H PRN    Or    acetaminophen (TYLENOL) suppository 650 mg  650 mg Rectal Q6H PRN    guaiFENesin-dextromethorphan (ROBITUSSIN DM) 100-10 mg/5 mL syrup 5 mL  5 mL Oral Q4H PRN    dexAMETHasone (DECADRON) tablet 6 mg  6 mg Oral DAILY    cholecalciferol (VITAMIN D3) (1000 Units /25 mcg) tablet 2,000 Units 2,000 Units Oral DAILY    ascorbic acid (vitamin C) (VITAMIN C) tablet 500 mg  500 mg Oral BID    zinc sulfate (ZINCATE) 50 mg zinc (220 mg) capsule 1 Cap  1 Cap Oral DAILY    sodium chloride (NS) flush 5-40 mL  5-40 mL IntraVENous Q8H    sodium chloride (NS) flush 5-40 mL  5-40 mL IntraVENous PRN    cefTRIAXone (ROCEPHIN) 1 g in 0.9% sodium chloride (MBP/ADV) 50 mL MBP  1 g IntraVENous Q24H    azithromycin (ZITHROMAX) 500 mg in 0.9% sodium chloride 250 mL (VIAL-MATE)  500 mg IntraVENous Q24H    albuterol (PROVENTIL HFA, VENTOLIN HFA, PROAIR HFA) inhaler 2 Puff  2 Puff Inhalation Q4H PRN         REVIEW OF SYSTEMS   Negative except as stated in the HPI. Physical Exam:   Visit Vitals  BP (!) 165/72 (BP 1 Location: Left arm, BP Patient Position: At rest)   Pulse 60   Temp 98.2 °F (36.8 °C)   Resp 22   SpO2 92%     Patient has COVID-19 infection and examination was deferred. Only inspection was performed. General:  Alert, cooperative, no distress. Head:  Normocephalic. Eyes:  Conjunctivae/corneas clear. Nose: Nares normal. Septum midline. Extremities: Extremities normal, atraumatic, no cyanosis or edema.        Skin: Skin color, texture, turgor normal. No rashes or lesions       Neurologic: Grossly nonfocal       Lab Results   Component Value Date/Time    Sodium 138 04/20/2021 10:31 AM    Potassium 3.3 (L) 04/20/2021 10:31 AM    Chloride 102 04/20/2021 10:31 AM    CO2 28 04/20/2021 10:31 AM    BUN 27 (H) 04/20/2021 10:31 AM    Creatinine 1.41 (H) 04/20/2021 10:31 AM    Glucose 110 (H) 04/20/2021 10:31 AM    Calcium 8.7 04/20/2021 10:31 AM    Magnesium 2.9 (H) 04/20/2021 07:12 PM    Lactic acid 0.9 04/20/2021 07:12 PM       Lab Results   Component Value Date/Time    WBC 6.8 04/20/2021 10:31 AM    HGB 14.2 04/20/2021 10:31 AM    PLATELET 072 93/84/6008 10:31 AM    MCV 86.5 04/20/2021 10:31 AM       Lab Results   Component Value Date/Time    INR 1.0 04/20/2021 07:12 PM aPTT 31.7 (H) 04/20/2021 07:12 PM    Alk. phosphatase 98 04/20/2021 10:31 AM    Protein, total 7.1 04/20/2021 10:31 AM    Albumin 2.6 (L) 04/20/2021 10:31 AM    Globulin 4.5 (H) 04/20/2021 10:31 AM       Lab Results   Component Value Date/Time    PHI 7.44 04/20/2021 04:50 PM    PCO2I 44.1 04/20/2021 04:50 PM    PO2I 65 (L) 04/20/2021 04:50 PM    HCO3I 29.7 (H) 04/20/2021 04:50 PM       Lab Results   Component Value Date/Time    Troponin-I, Qt. <0.05 04/20/2021 07:12 PM        IMPRESSION:  ===========  -COVID-19.  -COVID-19 pneumonia. -hypoxemic respiratory failure.  -asthma.  -seasonal allergies.  -Obesity. PLAN:  =====  -CRP / d-dimer daily. -procal 0.18: no WBC. Can stop abx   -on Decadron, currently; low threshold to transition to solu medrol   -lovenox bid dosing   -with CRP elevated will give actemra   -recommend remdeisvir   -vit d and type and screen   -diffuse bilateral GGO  -O2 supplementation above 90%   -sleep prone > 8 hours.       Saurav Velez PA-C

## 2021-04-21 NOTE — PROGRESS NOTES
200 Notified Dr. Theron Christianson that pt uses a CPap at bedtime at home. He reports his settings are between 10 and 12. Received order for CPap QHS using home settings. 200 Spoke with RT regarding CPap order. Order to be placed as CPap at 10.

## 2021-04-21 NOTE — PROGRESS NOTES
Transition of Care Plan   RUR- Low 10%   DISPOSITION: TBD   F/U with PCP/Specialist     Transport: TBD    CM attempted to call into patient's room for initial assessment, no answer. No emergency contact on file. CM to follow up later to complete assessment. GLORIA MarieS.CAMILA.  Supervisee

## 2021-04-22 ENCOUNTER — APPOINTMENT (OUTPATIENT)
Dept: GENERAL RADIOLOGY | Age: 51
DRG: 177 | End: 2021-04-22
Attending: PHYSICIAN ASSISTANT
Payer: COMMERCIAL

## 2021-04-22 LAB
ALBUMIN SERPL-MCNC: 2.7 G/DL (ref 3.5–5)
ALBUMIN/GLOB SERPL: 0.5 {RATIO} (ref 1.1–2.2)
ALP SERPL-CCNC: 106 U/L (ref 45–117)
ALT SERPL-CCNC: 55 U/L (ref 12–78)
ANION GAP SERPL CALC-SCNC: 6 MMOL/L (ref 5–15)
AST SERPL-CCNC: 55 U/L (ref 15–37)
BASOPHILS # BLD: 0 K/UL (ref 0–0.1)
BASOPHILS NFR BLD: 0 % (ref 0–1)
BILIRUB SERPL-MCNC: 0.6 MG/DL (ref 0.2–1)
BUN SERPL-MCNC: 34 MG/DL (ref 6–20)
BUN/CREAT SERPL: 31 (ref 12–20)
CALCIUM SERPL-MCNC: 8.9 MG/DL (ref 8.5–10.1)
CHLORIDE SERPL-SCNC: 105 MMOL/L (ref 97–108)
CO2 SERPL-SCNC: 28 MMOL/L (ref 21–32)
CREAT SERPL-MCNC: 1.08 MG/DL (ref 0.7–1.3)
CRP SERPL-MCNC: 9.33 MG/DL (ref 0–0.6)
D DIMER PPP FEU-MCNC: 0.65 MG/L FEU (ref 0–0.65)
DIFFERENTIAL METHOD BLD: ABNORMAL
EOSINOPHIL # BLD: 0 K/UL (ref 0–0.4)
EOSINOPHIL NFR BLD: 0 % (ref 0–7)
ERYTHROCYTE [DISTWIDTH] IN BLOOD BY AUTOMATED COUNT: 13.4 % (ref 11.5–14.5)
GLOBULIN SER CALC-MCNC: 5.1 G/DL (ref 2–4)
GLUCOSE BLD STRIP.AUTO-MCNC: 112 MG/DL (ref 65–100)
GLUCOSE BLD STRIP.AUTO-MCNC: 127 MG/DL (ref 65–100)
GLUCOSE BLD STRIP.AUTO-MCNC: 129 MG/DL (ref 65–100)
GLUCOSE BLD STRIP.AUTO-MCNC: 133 MG/DL (ref 65–100)
GLUCOSE SERPL-MCNC: 116 MG/DL (ref 65–100)
HCT VFR BLD AUTO: 44.2 % (ref 36.6–50.3)
HGB BLD-MCNC: 14.4 G/DL (ref 12.1–17)
IMM GRANULOCYTES # BLD AUTO: 0.1 K/UL (ref 0–0.04)
IMM GRANULOCYTES NFR BLD AUTO: 1 % (ref 0–0.5)
LYMPHOCYTES # BLD: 0.5 K/UL (ref 0.8–3.5)
LYMPHOCYTES NFR BLD: 8 % (ref 12–49)
MCH RBC QN AUTO: 29.1 PG (ref 26–34)
MCHC RBC AUTO-ENTMCNC: 32.6 G/DL (ref 30–36.5)
MCV RBC AUTO: 89.3 FL (ref 80–99)
MONOCYTES # BLD: 0.1 K/UL (ref 0–1)
MONOCYTES NFR BLD: 2 % (ref 5–13)
NEUTS SEG # BLD: 5.5 K/UL (ref 1.8–8)
NEUTS SEG NFR BLD: 89 % (ref 32–75)
NRBC # BLD: 0 K/UL (ref 0–0.01)
NRBC BLD-RTO: 0 PER 100 WBC
PLATELET # BLD AUTO: 251 K/UL (ref 150–400)
PMV BLD AUTO: 10.2 FL (ref 8.9–12.9)
POTASSIUM SERPL-SCNC: 3.9 MMOL/L (ref 3.5–5.1)
PROT SERPL-MCNC: 7.8 G/DL (ref 6.4–8.2)
RBC # BLD AUTO: 4.95 M/UL (ref 4.1–5.7)
RBC MORPH BLD: ABNORMAL
SERVICE CMNT-IMP: ABNORMAL
SODIUM SERPL-SCNC: 139 MMOL/L (ref 136–145)
WBC # BLD AUTO: 6.2 K/UL (ref 4.1–11.1)

## 2021-04-22 PROCEDURE — 85379 FIBRIN DEGRADATION QUANT: CPT

## 2021-04-22 PROCEDURE — 74011250636 HC RX REV CODE- 250/636: Performed by: PHYSICIAN ASSISTANT

## 2021-04-22 PROCEDURE — 74011250636 HC RX REV CODE- 250/636: Performed by: NURSE PRACTITIONER

## 2021-04-22 PROCEDURE — 82962 GLUCOSE BLOOD TEST: CPT

## 2021-04-22 PROCEDURE — 36415 COLL VENOUS BLD VENIPUNCTURE: CPT

## 2021-04-22 PROCEDURE — 97530 THERAPEUTIC ACTIVITIES: CPT

## 2021-04-22 PROCEDURE — 85025 COMPLETE CBC W/AUTO DIFF WBC: CPT

## 2021-04-22 PROCEDURE — 65660000001 HC RM ICU INTERMED STEPDOWN

## 2021-04-22 PROCEDURE — 80053 COMPREHEN METABOLIC PANEL: CPT

## 2021-04-22 PROCEDURE — 97161 PT EVAL LOW COMPLEX 20 MIN: CPT

## 2021-04-22 PROCEDURE — 74011250637 HC RX REV CODE- 250/637: Performed by: NURSE PRACTITIONER

## 2021-04-22 PROCEDURE — 71045 X-RAY EXAM CHEST 1 VIEW: CPT

## 2021-04-22 PROCEDURE — 74011250637 HC RX REV CODE- 250/637: Performed by: HOSPITALIST

## 2021-04-22 PROCEDURE — 97535 SELF CARE MNGMENT TRAINING: CPT

## 2021-04-22 PROCEDURE — 86140 C-REACTIVE PROTEIN: CPT

## 2021-04-22 PROCEDURE — 94660 CPAP INITIATION&MGMT: CPT

## 2021-04-22 PROCEDURE — 97165 OT EVAL LOW COMPLEX 30 MIN: CPT

## 2021-04-22 PROCEDURE — 74011250637 HC RX REV CODE- 250/637: Performed by: INTERNAL MEDICINE

## 2021-04-22 RX ORDER — ALBUTEROL SULFATE 90 UG/1
2 AEROSOL, METERED RESPIRATORY (INHALATION)
Status: DISCONTINUED | OUTPATIENT
Start: 2021-04-22 | End: 2021-05-02

## 2021-04-22 RX ORDER — FUROSEMIDE 10 MG/ML
40 INJECTION INTRAMUSCULAR; INTRAVENOUS DAILY
Status: DISCONTINUED | OUTPATIENT
Start: 2021-04-22 | End: 2021-05-06 | Stop reason: HOSPADM

## 2021-04-22 RX ADMIN — ENOXAPARIN SODIUM 40 MG: 100 INJECTION SUBCUTANEOUS at 05:14

## 2021-04-22 RX ADMIN — HYDROCHLOROTHIAZIDE: 25 TABLET ORAL at 11:38

## 2021-04-22 RX ADMIN — Medication 10 ML: at 05:23

## 2021-04-22 RX ADMIN — METHYLPREDNISOLONE SODIUM SUCCINATE 80 MG: 125 INJECTION, POWDER, FOR SOLUTION INTRAMUSCULAR; INTRAVENOUS at 18:56

## 2021-04-22 RX ADMIN — Medication 1 CAPSULE: at 09:24

## 2021-04-22 RX ADMIN — METHYLPREDNISOLONE SODIUM SUCCINATE 80 MG: 125 INJECTION, POWDER, FOR SOLUTION INTRAMUSCULAR; INTRAVENOUS at 23:25

## 2021-04-22 RX ADMIN — Medication 2000 UNITS: at 09:23

## 2021-04-22 RX ADMIN — ENOXAPARIN SODIUM 40 MG: 100 INJECTION SUBCUTANEOUS at 16:43

## 2021-04-22 RX ADMIN — OXYCODONE HYDROCHLORIDE AND ACETAMINOPHEN 500 MG: 500 TABLET ORAL at 18:56

## 2021-04-22 RX ADMIN — METHYLPREDNISOLONE SODIUM SUCCINATE 80 MG: 125 INJECTION, POWDER, FOR SOLUTION INTRAMUSCULAR; INTRAVENOUS at 11:38

## 2021-04-22 RX ADMIN — CITALOPRAM HYDROBROMIDE 40 MG: 20 TABLET ORAL at 09:24

## 2021-04-22 RX ADMIN — Medication 10 ML: at 22:33

## 2021-04-22 RX ADMIN — FUROSEMIDE 40 MG: 10 INJECTION, SOLUTION INTRAMUSCULAR; INTRAVENOUS at 09:23

## 2021-04-22 RX ADMIN — OXYCODONE HYDROCHLORIDE AND ACETAMINOPHEN 500 MG: 500 TABLET ORAL at 09:24

## 2021-04-22 NOTE — PROGRESS NOTES
Problem: Self Care Deficits Care Plan (Adult)  Goal: *Acute Goals and Plan of Care (Insert Text)  Description:   FUNCTIONAL STATUS PRIOR TO ADMISSION: Patient was independent and active without use of DME. Patient was independent for basic and instrumental ADLs, working full time in [employer], driving, etc.     HOME SUPPORT: The patient lived with his son (who also works full time) but did not require assist.    Occupational Therapy Goals  Initiated 4/22/2021  1. Patient will perform grooming EOB with minimal assistance/contact guard assist within 7 day(s). 2.  Patient will perform seated bathing with minimal assistance/contact guard assist and rest breaks PRN within 7 day(s). 3.  Patient will perform lower body dressing with minimal assistance/contact guard assist & adaptive techniques PRN within 7 day(s). 4.  Patient will perform toilet transfers to/from [facility] with minimal assistance/contact guard assist within 7 day(s). 5.  Patient will perform all aspects of toileting with moderate assistance within 7 day(s). 6.  Patient will participate in upper extremity therapeutic exercise/activities with supervision/set-up for 5 minutes with rest breaks PRN within 7 day(s). 7.  Patient will utilize energy conservation techniques during functional activities with verbal and visual cues within 7 day(s). Outcome: Not Met     OCCUPATIONAL THERAPY EVALUATION  Patient: Giorgi Lombardi (54 y.o. male)  Date: 4/22/2021  Primary Diagnosis: Respiratory failure ([facility] [address] [J96.90]  COVID-19 [U07.1]        Precautions: (Droplet Plus)    ASSESSMENT  Based on the objective data described below, the patient presents with global debility, decreased ROM & strength, poor cardiopulmonary endurance, and anxiety s/p admission for COVID PNA.  At baseline, he is highly IND, working, driving, living with his son; he now presents far from this baseline, able to mobilize some in supine with SBA, however desaturation to 85% with ~1 minute recovery on 60L HFNC (FIO2 75%). He is highly motivated to progress, but requiring Min-Max A for most ADLs at this time d/t debility. Considering high PLOF, he would benefit from d/c to pulmonary IPR to maximize safety & functional independence. Current Level of Function Impacting Discharge (ADLs/self-care): setup-Max A for ADLs, SBA-Min A for minimal bed mobility with desaturations on HFNC    Functional Outcome Measure: The patient scored Total: 30/100 on the Barthel Index outcome measure which is indicative of 70% impaired ability to care for basic self needs/dependency on others; inferred 100% dependency on others for instrumental ADLs. Other factors to consider for discharge: debility, COVID+, high PLOF, PMH     Patient will benefit from skilled therapy intervention to address the above noted impairments. PLAN :  Recommendations and Planned Interventions: self care training, functional mobility training, therapeutic exercise, balance training, therapeutic activities, endurance activities, patient education, home safety training, and family training/education    Frequency/Duration: Patient will be followed by occupational therapy 5 times a week to address goals. Recommendation for discharge: (in order for the patient to meet his/her long term goals)  Therapy 3 hours per day 5-7 days per week    This discharge recommendation:  A follow-up discussion with the attending provider and/or case management is planned    IF patient discharges home will need the following DME: To be determined (TBD)         SUBJECTIVE:   Patient stated Is this [oxygen] working right? What's my oxygen level.  patient anxiously asking upon entry into room    OBJECTIVE DATA SUMMARY:   HISTORY:   Past Medical History:   Diagnosis Date    Asthma     High cholesterol     Hypertension     Sleep apnea      Past Surgical History:   Procedure Laterality Date    HX HERNIA REPAIR      HX OPEN REDUCTION INTERNAL FIXATION rt leg       Expanded or extensive additional review of patient history:     Home Situation  Home Environment: Private residence  # Steps to Enter: 5  One/Two Story Residence: One story  Living Alone: No  Support Systems: Child(gama)  Patient Expects to be Discharged to[de-identified] Private residence  Current DME Used/Available at Home: Walker, rolling, Wheelchair, [address] chair, Grab bars  Tub or Shower Type: Shower    Hand dominance: Right    EXAMINATION OF PERFORMANCE DEFICITS:  Cognitive/Behavioral Status:  Neurologic State: Alert  Orientation Level: Oriented X4  Cognition: Appropriate for age attention/concentration; Follows commands  Perception: Appears intact  Perseveration: No perseveration noted  Safety/Judgement: Awareness of environment; Fall prevention    Skin: Appears intact    Edema: Observation limited by body habitus    Hearing: Auditory  Auditory Impairment: None    Vision/Perceptual:    Tracking: Able to track stimulus in all quadrants w/o difficulty                      Acuity: Within Defined Limits    Corrective Lenses: Glasses    Range of Motion:  AROM: Within functional limits  PROM: Within functional limits                      Strength:  Strength: Generally decreased, functional                Coordination:  Coordination: Generally decreased, functional  Fine Motor Skills-Upper: Left Intact; Right Intact    Gross Motor Skills-Upper: Left Intact; Right Intact    Tone & Sensation:  Tone: Normal  Sensation: Intact                      Balance:  Sitting: Impaired    Functional Mobility and Transfers for ADLs:  Bed Mobility:  Supine to Sit: Bed Modified  Scooting: Stand-by assistance(repositioning in bed, desaturations with limited activity)    Transfers: Toilet Transfer : Setup(infer rolling in supine for bedpan d/t debility)    ADL Assessment:  Feeding: Setup    Oral Facial Hygiene/Grooming: Setup    Bathing: Moderate assistance    Upper Body Dressing: Minimum assistance    Lower Body Dressing:  Moderate assistance    Toileting: Maximum assistance                ADL Intervention and task modifications:    Lower Body Dressing  Socks: Minimal assistance; Compensatory technique training (simulated in supine; A for distal reach)      Cognitive Retraining  Safety/Judgement: Awareness of environment; Fall prevention    Therapeutic Exercise:  Patient instructed and indicated understanding the benefits of maintaining activity tolerance, functional mobility, and independence with self care tasks during acute stay to ensure safe return home and to baseline, including bed level exercise program with frequent rest breaks and no more then 5 reps at a time. Encouraged patient to increase frequency and duration with HOB elevated and performing daily ADLs with staff assist as activity tolerance allows. Functional Measure:  Barthel Index:    Bathin  Bladder: 5  Bowels: 5  Groomin  Dressin  Feeding: 10  Mobility: 0  Stairs: 0  Toilet Use: 0  Transfer (Bed to Chair and Back): 0  Total: 30/100        The Barthel ADL Index: Guidelines  1. The index should be used as a record of what a patient does, not as a record of what a patient could do. 2. The main aim is to establish degree of independence from any help, physical or verbal, however minor and for whatever reason. 3. The need for supervision renders the patient not independent. 4. A patient's performance should be established using the best available evidence. Asking the patient, friends/relatives and nurses are the usual sources, but direct observation and common sense are also important. However direct testing is not needed. 5. Usually the patient's performance over the preceding 24-48 hours is important, but occasionally longer periods will be relevant. 6. Middle categories imply that the patient supplies over 50 per cent of the effort. 7. Use of aids to be independent is allowed. Sondra Nova, Barthel, D.W. (2405).  Functional evaluation: the Barthel Index. [address] [facility] (14)2. NICOLÁS Prado, Wyatt Wu., Chirag Jon., [place], [address] (1999). Measuring the change indisability after inpatient rehabilitation; comparison of the responsiveness of the Barthel Index and Functional [place] Measure. Journal of Neurology, Neurosurgery, and Psychiatry, 66(4), 934-162. NADINE Bernardo, ALIE Yeung, & Nina Chatterjee M.A. (2004.) Assessment of post-stroke quality of life in cost-effectiveness studies: The usefulness of the Barthel Index and the EuroQoL-5D. Quality of Life Research, 15, 314-34         Occupational Therapy Evaluation Charge Determination   History Examination Decision-Making   LOW Complexity : Brief history review  MEDIUM Complexity : 3-5 performance deficits relating to physical, cognitive , or psychosocial skils that result in activity limitations and / or participation restrictions LOW Complexity : No comorbidities that affect functional and no verbal or physical assistance needed to complete eval tasks       Based on the above components, the patient evaluation is determined to be of the following complexity level: LOW   Pain Rating:  Buttocks discomfort reported    Activity Tolerance:   Poor, desaturates with exertion and requires oxygen, requires frequent rest breaks, and observed SOB with activity    After treatment patient left in no apparent distress:    Supine in bed, Call bell within reach, and Side rails x 3    COMMUNICATION/EDUCATION:   The patients plan of care was discussed with: Physical therapist and Registered nurse. Home safety education was provided and the patient/caregiver indicated understanding., Patient/family have participated as able in goal setting and plan of care. , and Patient/family agree to work toward stated goals and plan of care. This patients plan of care is appropriate for delegation to [facility].     Thank you for this referral.  BIBI Mcmillan, OTR/L  Time Calculation: 30 mins

## 2021-04-22 NOTE — PROGRESS NOTES
[facility] Adult  Hospitalist Group                                                                                          Hospitalist Progress Note  Maria D Schaffer MD  Answering service: [phone] OR [phone] from in house phone        Date of Service:  2021  NAME:  Jacques Marlow  :  1970  MRN:  [id number]      Admission Summary:   48 y.o. male with past medical history of asthma, hypertension, hyperlipidemia and morbid obesity presented to [facility] ED this morning with increased shortness of breath. Interval history / Subjective:   Patient seen and examined this afternoon. Patient now on high flow, was anxious earlier this morning, crying per nursing report. \"I didn't have any BM since I came in here\". He mentioned better with breathing compared to this morning. Assessment & Plan:     # Acute hypoxemic respiratory failure  # COVID 19 pneumonia  - on high flow   - switched decadron to Solumedrol   - s/p actemra   - on vit c and Zinc   - diuresis on IV lasix   - follow daily inflammatory markers   - pulmonologist following  - low procal,does not need abx  - wean down oxygen as tolerated       # HTN:-BP wnl, hold antihypertensives  # Super morbid obesity: BMI 53  # Depression: celexa  # DOMÍNGUEZ:MWDV  # Morbid Obesity       Code status: full  DVT prophylaxis: lovenox    Care Plan discussed with: patient seen and examined  Anticipated Disposition:tbd  Anticipated Discharge: tbd     Hospital Problems  Never Reviewed          Codes Class Noted POA    Respiratory failure ([facility] [address] ICD-10-CM: J96.90  ICD-9-CM: 518.81  2021 Unknown        COVID-19 ICD-10-CM: U07.1  ICD-9-CM: 079.89  2021 Unknown                Review of Systems:   Pertinent positive mentioned in interval hx/HPI. Other systems reviewed and negative     Vital Signs:    Last 24hrs VS reviewed since prior progress note.  Most recent are:  Visit Vitals  BP (!) 165/70 (BP 1 Location: Right arm, BP Patient Position: At rest) Pulse 77   Temp 98.2 °F (36.8 °C)   Resp 28   Wt (!) 165.6 kg (365 lb 1.6 oz)   SpO2 92%   BMI 52.39 kg/m²         Intake/Output Summary (Last 24 hours) at 4/22/2021 1729  Last data filed at 4/22/2021 1142  Gross per 24 hour   Intake    Output 1005 ml   Net -1005 ml        Physical Examination:     I had a face to face encounter with this patient and independently examined them on 4/22/2021 as outlined below:          Constitutional:  No acute distress, obese    ENT:  Oral mucosa moist, oropharynx benign. Resp:  decreased breath sounds b/l, no wheezing,no accessory muscle use   CV:  Regular rhythm, normal rate, S1,S2 wnl    GI:  Soft, non distended, obese    Musculoskeletal:  No LE edema    Neurologic:  Moves all extremities. AAOx3     Psych:  not anxious nor agitated. Skin: no rashes or ulcers       Data Review:     I personally reviewed labs and imaging     Labs:     Recent Labs     04/22/21 0614 04/20/21  1031   WBC 6.2 6.8   HGB 14.4 14.2   HCT 44.2 41.0    202     Recent Labs     04/22/21 0614 04/21/21  1409 04/20/21 1912 04/20/21  1031    139  --  138   K 3.9 3.7  --  3.3*    105  --  102   CO2 28 27  --  28   BUN 34* 30*  --  27*   CREA 1.08 1.03  --  1.41*   * 124*  --  110*   CA 8.9 8.9  --  8.7   MG  --   --  2.9*  --      Recent Labs     04/22/21 0614 04/21/21  1409 04/20/21  1031   ALT 55 50 50    98 98   TBILI 0.6 0.4 0.5   TP 7.8 7.3 7.1   ALB 2.7* 2.5* 2.6*   GLOB 5.1* 4.8* 4.5*     Recent Labs     04/20/21 1912   INR 1.0   PTP 10.4   APTT 31.7*      No results for input(s): FE, TIBC, PSAT, FERR in the last 72 hours. No results found for: FOL, RBCF   No results for input(s): PH, PCO2, PO2 in the last 72 hours.   Recent Labs     04/20/21  1912 04/20/21  1031   TROIQ <0.05 <0.05     No results found for: CHOL, CHOLX, CHLST, CHOLV, HDL, HDLP, LDL, LDLC, DLDLP, TGLX, TRIGL, TRIGP, CHHD, CHHDX  Lab Results   Component Value Date/Time    Glucose (POC) 127 (H) 04/22/2021 04:48 PM    Glucose (POC) 112 (H) 04/22/2021 11:46 AM    Glucose (POC) 129 (H) 04/22/2021 06:51 AM    Glucose (POC) 128 (H) 04/21/2021 08:58 PM    Glucose (POC) 140 (H) 04/21/2021 04:42 PM     Lab Results   Component Value Date/Time    Color YELLOW/STRAW 04/21/2021 04:16 AM    Appearance CLEAR 04/21/2021 04:16 AM    Specific gravity 1.030 04/21/2021 04:16 AM    pH (UA) 5.0 04/21/2021 04:16 AM    Protein TRACE (A) 04/21/2021 04:16 AM    Glucose Negative 04/21/2021 04:16 AM    Ketone Negative 04/21/2021 04:16 AM    Bilirubin Negative 04/21/2021 04:16 AM    Urobilinogen 0.2 04/21/2021 04:16 AM    Nitrites Negative 04/21/2021 04:16 AM    Leukocyte Esterase Negative 04/21/2021 04:16 AM    Epithelial cells FEW 04/21/2021 04:16 AM    Bacteria Negative 04/21/2021 04:16 AM    WBC 0-4 04/21/2021 04:16 AM    RBC 0-5 04/21/2021 04:16 AM         Medications Reviewed:     Current Facility-Administered Medications   Medication Dose Route Frequency    methylPREDNISolone (PF) (SOLU-MEDROL) injection 80 mg  80 mg IntraVENous Q6H    furosemide (LASIX) injection 40 mg  40 mg IntraVENous DAILY    losartan/hydroCHLOROthiazide (HYZAAR) 100/25 mg   Oral DAILY    albuterol (PROVENTIL HFA, VENTOLIN HFA, PROAIR HFA) inhaler 2 Puff  2 Puff Inhalation Q4H RT    insulin lispro (HUMALOG) injection   SubCUTAneous AC&HS    glucose chewable tablet 16 g  4 Tab Oral PRN    dextrose (D50W) injection syrg 12.5-25 g  12.5-25 g IntraVENous PRN    glucagon (GLUCAGEN) injection 1 mg  1 mg IntraMUSCular PRN    citalopram (CELEXA) tablet 40 mg  40 mg Oral DAILY    enoxaparin (LOVENOX) injection 40 mg  40 mg SubCUTAneous Q12H    acetaminophen (TYLENOL) tablet 650 mg  650 mg Oral Q6H PRN    Or    acetaminophen (TYLENOL) suppository 650 mg  650 mg Rectal Q6H PRN    guaiFENesin-dextromethorphan (ROBITUSSIN DM) 100-10 mg/5 mL syrup 5 mL  5 mL Oral Q4H PRN    cholecalciferol (VITAMIN D3) (1000 Units /25 mcg) tablet 2,000 Units  2,000 Units Oral DAILY    ascorbic acid (vitamin C) (VITAMIN C) tablet 500 mg  500 mg Oral BID    zinc sulfate (ZINCATE) 50 mg zinc (220 mg) capsule 1 Cap  1 Cap Oral DAILY    sodium chloride (NS) flush 5-40 mL  5-40 mL IntraVENous Q8H    sodium chloride (NS) flush 5-40 mL  5-40 mL IntraVENous PRN     ______________________________________________________________________  EXPECTED LENGTH OF STAY: 5d 9h  ACTUAL LENGTH OF STAY:          2                 Africa Hodge MD
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
## 2021-04-22 NOTE — PROGRESS NOTES
CM spoke with bedside RN, the patient's Oxygen status is tenuous, requiring CPAP and MidFlow, may require BiPAP- anticipate patient will not be able to participate in assessment at this time due to respiratory needs- will call [facility] for initial assessment- emergency contact is listed as Soniya Box, relation not listed- will check with RN. 17:04 p.m.- CM attempted to contact patient in room, unable to reach-     Lisa Abad is the patient's daughter, discussed with RN- will call daughter for initial assessment.      JAC Mack

## 2021-04-22 NOTE — PROGRESS NOTES
Problem: Mobility Impaired (Adult and Pediatric)  Goal: *Acute Goals and Plan of Care (Insert Text)  Description: FUNCTIONAL STATUS PRIOR TO ADMISSION: Patient was independent and active without use of DME.    HOME SUPPORT PRIOR TO ADMISSION: The patient lived with family but did not require assist.    Physical Therapy Goals  Initiated 4/22/2021  1. Patient will move from supine to sit and sit to supine , scoot up and down, and roll side to side in bed with modified independence within 7 day(s). 2.  Patient will transfer from bed to chair and chair to bed with modified independence using the least restrictive device within 7 day(s). 3.  Patient will perform sit to stand with minimal assistance/contact guard assist within 7 day(s). 4.  Patient will ambulate with minimal assistance/contact guard assist for 10 feet with the least restrictive device within 7 day(s). 5.  Patient will ascend/descend 2 stairs with one handrail(s) with minimal assistance/contact guard assist within 7 day(s). Outcome: Not Met   PHYSICAL THERAPY EVALUATION  Patient: Buddy Russell (54 y.o. male)  Date: 4/22/2021  Primary Diagnosis: Respiratory failure ([facility] [address] [J96.90]  COVID-19 [U07.1]        Precautions:          ASSESSMENT  Based on the objective data described below, the patient presents with decreased activity tolerance and weakness. Pt recently transitioned from CPAP to HFNC (75% FiO2, 60 L/min). Pt reporting difficulty breathing and limited to in bed, yet motivated for next session. Pt performed LE exercises and educated pt on continuing to perform to promote strengthening. Educated pt on activity pacing and repositioning in bed. Will attempt sitting EOB next session as appropriate. Current Level of Function Impacting Discharge (mobility/balance): unable to sit EOB    Functional Outcome Measure:   The patient scored Total: 25/100 on the Barthel Index which is indicative of 75% impaired ability to care for basic self needs/dependency on others. Other factors to consider for discharge: fall risk     Patient will benefit from skilled therapy intervention to address the above noted impairments. PLAN :  Recommendations and Planned Interventions: bed mobility training, transfer training, gait training, therapeutic exercises, neuromuscular re-education, patient and family training/education, and therapeutic activities      Frequency/Duration: Patient will be followed by physical therapy:  5 times a week to address goals. Recommendation for discharge: (in order for the patient to meet his/her long term goals)  Therapy 3 hours per day 5-7 days per week pending further assessment  If pt were to d/c home, would benefit from HHPT and require assistance/supervision for all mobility as pt is a fall risk      This discharge recommendation:  Has not yet been discussed the attending provider and/or case management    IF patient discharges home will need the following DME: hospital bed and mechanical lift         SUBJECTIVE:   Patient stated I hope I make it out of here.     OBJECTIVE DATA SUMMARY:   HISTORY:    Past Medical History:   Diagnosis Date    Asthma     High cholesterol     Hypertension     Sleep apnea      Past Surgical History:   Procedure Laterality Date    HX HERNIA REPAIR      HX OPEN REDUCTION INTERNAL FIXATION      rt leg       Personal factors and/or comorbidities impacting plan of care: PMH    Home Situation  Home Environment: Private residence  One/Two Story Residence: One story  Living Alone: No  Support Systems: Family member(s)  Patient Expects to be Discharged to[de-identified] Private residence  Current DME Used/Available at Home: None    EXAMINATION/PRESENTATION/DECISION MAKING:   Critical Behavior:              Hearing:   Auditory  Auditory Impairment: None  Skin:  intact  Edema: none noted  Range Of Motion:  AROM: Within functional limits           PROM: Within functional limits           Strength:    Strength: Generally decreased, functional                    Tone & Sensation:                                  Coordination:  Coordination: Generally decreased, functional  Vision:      Functional Mobility:  Bed Mobility:              Transfers:                             Balance:   Sitting: Impaired        Therapeutic Exercises:   Pt performed supine ankle pump, heel slides, and SLR x10    Functional Measure:  Barthel Index:    Bathin  Bladder: 5  Bowels: 5  Groomin  Dressin  Feeding: 10  Mobility: 0  Stairs: 0  Toilet Use: 0  Transfer (Bed to Chair and Back): 0  Total: 25/100       The Barthel ADL Index: Guidelines  1. The index should be used as a record of what a patient does, not as a record of what a patient could do. 2. The main aim is to establish degree of independence from any help, physical or verbal, however minor and for whatever reason. 3. The need for supervision renders the patient not independent. 4. A patient's performance should be established using the best available evidence. Asking the patient, friends/relatives and nurses are the usual sources, but direct observation and common sense are also important. However direct testing is not needed. 5. Usually the patient's performance over the preceding 24-48 hours is important, but occasionally longer periods will be relevant. 6. Middle categories imply that the patient supplies over 50 per cent of the effort. 7. Use of aids to be independent is allowed. [facility]., Barthel, DTIANNA. (8308). Functional evaluation: the Barthel Index. [facility] (14)2. NICOLÁS Aj, Rohit Nguyễn., Merlin Ates., [place], [address] [place] [address] (). Measuring the change indisability after inpatient rehabilitation; comparison of the responsiveness of the Barthel Index and Functional [place] Measure. Journal of Neurology, Neurosurgery, and Psychiatry, 66(4), 805-283.   Blake Mcmahon, NJAMIL.A, ALIE Yeung, Timmy Callahan MSundarA. (2004.) Assessment of post-stroke quality of life in cost-effectiveness studies: The usefulness of the Barthel Index and the EuroQoL-5D. Quality of Life Research, 15, 467-77        Physical Therapy Evaluation Charge Determination   History Examination Presentation Decision-Making   HIGH Complexity :3+ comorbidities / personal factors will impact the outcome/ POC  LOW Complexity : 1-2 Standardized tests and measures addressing body structure, function, activity limitation and / or participation in recreation  MEDIUM Complexity : Evolving with changing characteristics  Other outcome measures barthel index  HIGH       Based on the above components, the patient evaluation is determined to be of the following complexity level: LOW         Activity Tolerance:   Fair and requires rest breaks    After treatment patient left in no apparent distress:   Supine in bed, Call bell within reach, and Side rails x 3    COMMUNICATION/EDUCATION:   The patients plan of care was discussed with: Occupational therapist and Registered nurse. Fall prevention education was provided and the patient/caregiver indicated understanding., Patient/family have participated as able in goal setting and plan of care. , and Patient/family agree to work toward stated goals and plan of care.     Thank you for this referral.  Aliyah Alvarado, PT, DPT   Time Calculation: 8 mins

## 2021-04-22 NOTE — PROGRESS NOTES
Name: [facility]: [address]   : 1970 Admit Date: 2021   Phone: [phone]  Room: [facility]   PCP: None  MRN: [id number]   Date: 2021  Code: Full Code        HPI:      CRP 9.33  Now on NIV/high flow  Could not receive remdesivir due to hospital policy   Notable desaturations last night and this am  Feels ok right now  Dicussed with RN       CRP 17.8  Still on 6lpm  Feels about the same   Day 7 of dx       5:44 PM       History was obtained from patient. I was asked by Nida Cnao MD to see [facility] Client in consultation for a chief complaint of shortness of breath. History of Present Illness: 48year old male with past medical history of asthma, allergic rhinitis and morbid obesity who presented to [facility] with increased shortness of breath. Diagnosed with COVID-19 1 week back at [facility]. Last fever 7 days back. Does have cough, shortness of breath and wheezing. On prednisone 20 mg q daily. Data:  Hgb 6.8  Wbc 14  Plt 202  D-dimer 0.79  Cr 1.41  Trop < 0.05  nt pro bnp 25  CRP pending. CXR - bilateral infiltrates. Past Medical History:   Diagnosis Date    Asthma     High cholesterol     Hypertension     Sleep apnea        Past Surgical History:   Procedure Laterality Date    HX HERNIA REPAIR      HX OPEN REDUCTION INTERNAL FIXATION      rt leg       No family history on file.     Social History     Tobacco Use    Smoking status: Never Smoker    Smokeless tobacco: Current User   Substance Use Topics    Alcohol use: Never     Frequency: Never       Allergies   Allergen Reactions    Penicillins Unknown (comments)       Current Facility-Administered Medications   Medication Dose Route Frequency    methylPREDNISolone (PF) (SOLU-MEDROL) injection 80 mg  80 mg IntraVENous Q6H    furosemide (LASIX) injection 40 mg  40 mg IntraVENous DAILY    losartan/hydroCHLOROthiazide (HYZAAR) 100/25 mg   Oral DAILY    insulin lispro (HUMALOG) injection SubCUTAneous AC&HS    glucose chewable tablet 16 g  4 Tab Oral PRN    dextrose (D50W) injection syrg 12.5-25 g  12.5-25 g IntraVENous PRN    glucagon (GLUCAGEN) injection 1 mg  1 mg IntraMUSCular PRN    citalopram (CELEXA) tablet 40 mg  40 mg Oral DAILY    enoxaparin (LOVENOX) injection 40 mg  40 mg SubCUTAneous Q12H    acetaminophen (TYLENOL) tablet 650 mg  650 mg Oral Q6H PRN    Or    acetaminophen (TYLENOL) suppository 650 mg  650 mg Rectal Q6H PRN    guaiFENesin-dextromethorphan (ROBITUSSIN DM) 100-10 mg/5 mL syrup 5 mL  5 mL Oral Q4H PRN    cholecalciferol (VITAMIN D3) (1000 Units /25 mcg) tablet 2,000 Units  2,000 Units Oral DAILY    ascorbic acid (vitamin C) (VITAMIN C) tablet 500 mg  500 mg Oral BID    zinc sulfate (ZINCATE) 50 mg zinc (220 mg) capsule 1 Cap  1 Cap Oral DAILY    sodium chloride (NS) flush 5-40 mL  5-40 mL IntraVENous Q8H    sodium chloride (NS) flush 5-40 mL  5-40 mL IntraVENous PRN    albuterol (PROVENTIL HFA, VENTOLIN HFA, PROAIR HFA) inhaler 2 Puff  2 Puff Inhalation Q4H PRN         REVIEW OF SYSTEMS   Negative except as stated in the HPI. Physical Exam:   Visit Vitals  BP (!) (P) 172/94 (BP 1 Location: Right arm, BP Patient Position: At rest)   Pulse (!) (P) 52   Temp 98.4 °F (36.9 °C)   Resp (P) 20   Wt (!) 165.6 kg (365 lb 1.6 oz)   SpO2 94%   BMI 52.39 kg/m²     Patient has COVID-19 infection and examination was deferred. Only inspection was performed. General:  Alert, cooperative, no distress. Head:  Normocephalic. Eyes:  Conjunctivae/corneas clear. Nose: Nares normal. Septum midline. Extremities: Extremities normal, atraumatic, no cyanosis or edema.        Skin: Skin color, texture, turgor normal. No rashes or lesions       Neurologic: Grossly nonfocal       Lab Results   Component Value Date/Time    Sodium 139 04/22/2021 06:14 AM    Potassium 3.9 04/22/2021 06:14 AM    Chloride 105 04/22/2021 06:14 AM    CO2 28 04/22/2021 06:14 AM    BUN 34 (H) 04/22/2021 06:14 AM    Creatinine 1.08 04/22/2021 06:14 AM    Glucose 116 (H) 04/22/2021 06:14 AM    Calcium 8.9 04/22/2021 06:14 AM    Magnesium 2.9 (H) 04/20/2021 07:12 PM    Lactic acid 0.9 04/20/2021 07:12 PM       Lab Results   Component Value Date/Time    WBC 6.2 04/22/2021 06:14 AM    HGB 14.4 04/22/2021 06:14 AM    PLATELET 407 14/72/2989 06:14 AM    MCV 89.3 04/22/2021 06:14 AM       Lab Results   Component Value Date/Time    INR 1.0 04/20/2021 07:12 PM    aPTT 31.7 (H) 04/20/2021 07:12 PM    Alk. phosphatase 106 04/22/2021 06:14 AM    Protein, total 7.8 04/22/2021 06:14 AM    Albumin 2.7 (L) 04/22/2021 06:14 AM    Globulin 5.1 (H) 04/22/2021 06:14 AM       No results found for: PH, PHI, PCO2, PCO2I, PO2, PO2I, HCO3, HCO3I, FIO2, FIO2I    Lab Results   Component Value Date/Time    Troponin-I, Qt. <0.05 04/20/2021 07:12 PM        IMPRESSION:  ===========  -COVID-19.  -COVID-19 pneumonia. O + blood type. Vit D deficient   -hypoxemic respiratory failure. Worsening, suspected ARDS  -asthma.  -seasonal allergies.  -Obesity. PLAN:  =====  -CRP / d-dimer daily.   -start solu medrol   -lovenox bid dosing   -s/p actemra   -diuresis   -O2 supplementation above 90%, high flow/NIV   -sleep prone > 8 hours.  -chest x-ray   -Patient is critically ill and at high risk for further decline, mechanical ventilation, and ICU admission due to [address] ARDS; CC time spent excluding procedures is > 35 minutes       Keven Amaya PA-C

## 2021-04-22 NOTE — PROGRESS NOTES
Bedside and Verbal shift change report given to Luciana Morrison (oncoming nurse) by Evonne Davison (offgoing nurse).  Report included the following information SBAR, Kardex, ED Summary, Procedure Summary, Intake/Output, MAR, Recent Results and Cardiac Rhythm SB.

## 2021-04-23 LAB
ALBUMIN SERPL-MCNC: 2.8 G/DL (ref 3.5–5)
ALBUMIN/GLOB SERPL: 0.7 {RATIO} (ref 1.1–2.2)
ALP SERPL-CCNC: 106 U/L (ref 45–117)
ALT SERPL-CCNC: 53 U/L (ref 12–78)
ANION GAP SERPL CALC-SCNC: 9 MMOL/L (ref 5–15)
AST SERPL-CCNC: 43 U/L (ref 15–37)
BASOPHILS # BLD: 0 K/UL (ref 0–0.1)
BASOPHILS NFR BLD: 0 % (ref 0–1)
BILIRUB SERPL-MCNC: 0.5 MG/DL (ref 0.2–1)
BNP SERPL-MCNC: 44 PG/ML
BUN SERPL-MCNC: 42 MG/DL (ref 6–20)
BUN/CREAT SERPL: 37 (ref 12–20)
CALCIUM SERPL-MCNC: 8.7 MG/DL (ref 8.5–10.1)
CHLORIDE SERPL-SCNC: 104 MMOL/L (ref 97–108)
CO2 SERPL-SCNC: 27 MMOL/L (ref 21–32)
CREAT SERPL-MCNC: 1.15 MG/DL (ref 0.7–1.3)
CRP SERPL-MCNC: 4.53 MG/DL (ref 0–0.6)
D DIMER PPP FEU-MCNC: 0.55 MG/L FEU (ref 0–0.65)
DIFFERENTIAL METHOD BLD: ABNORMAL
EOSINOPHIL # BLD: 0 K/UL (ref 0–0.4)
EOSINOPHIL NFR BLD: 0 % (ref 0–7)
ERYTHROCYTE [DISTWIDTH] IN BLOOD BY AUTOMATED COUNT: 13 % (ref 11.5–14.5)
FERRITIN SERPL-MCNC: 2008 NG/ML (ref 26–388)
GLOBULIN SER CALC-MCNC: 3.9 G/DL (ref 2–4)
GLUCOSE BLD STRIP.AUTO-MCNC: 137 MG/DL (ref 65–100)
GLUCOSE BLD STRIP.AUTO-MCNC: 149 MG/DL (ref 65–100)
GLUCOSE BLD STRIP.AUTO-MCNC: 158 MG/DL (ref 65–100)
GLUCOSE BLD STRIP.AUTO-MCNC: 199 MG/DL (ref 65–100)
GLUCOSE SERPL-MCNC: 147 MG/DL (ref 65–100)
HCT VFR BLD AUTO: 43.8 % (ref 36.6–50.3)
HGB BLD-MCNC: 14.4 G/DL (ref 12.1–17)
IMM GRANULOCYTES # BLD AUTO: 0.1 K/UL (ref 0–0.04)
IMM GRANULOCYTES NFR BLD AUTO: 1 % (ref 0–0.5)
LDH SERPL L TO P-CCNC: 694 U/L (ref 85–241)
LYMPHOCYTES # BLD: 0.5 K/UL (ref 0.8–3.5)
LYMPHOCYTES NFR BLD: 6 % (ref 12–49)
MCH RBC QN AUTO: 29.3 PG (ref 26–34)
MCHC RBC AUTO-ENTMCNC: 32.9 G/DL (ref 30–36.5)
MCV RBC AUTO: 89 FL (ref 80–99)
MONOCYTES # BLD: 0.3 K/UL (ref 0–1)
MONOCYTES NFR BLD: 3 % (ref 5–13)
NEUTS SEG # BLD: 7.5 K/UL (ref 1.8–8)
NEUTS SEG NFR BLD: 90 % (ref 32–75)
NRBC # BLD: 0 K/UL (ref 0–0.01)
NRBC BLD-RTO: 0 PER 100 WBC
PLATELET # BLD AUTO: 247 K/UL (ref 150–400)
PMV BLD AUTO: 9.7 FL (ref 8.9–12.9)
POTASSIUM SERPL-SCNC: 4 MMOL/L (ref 3.5–5.1)
PROT SERPL-MCNC: 6.7 G/DL (ref 6.4–8.2)
RBC # BLD AUTO: 4.92 M/UL (ref 4.1–5.7)
RBC MORPH BLD: ABNORMAL
SERVICE CMNT-IMP: ABNORMAL
SODIUM SERPL-SCNC: 140 MMOL/L (ref 136–145)
WBC # BLD AUTO: 8.4 K/UL (ref 4.1–11.1)

## 2021-04-23 PROCEDURE — 74011000250 HC RX REV CODE- 250: Performed by: INTERNAL MEDICINE

## 2021-04-23 PROCEDURE — 77030020365 HC SOL INJ SOD CL 0.9% 50ML

## 2021-04-23 PROCEDURE — 36415 COLL VENOUS BLD VENIPUNCTURE: CPT

## 2021-04-23 PROCEDURE — 77010033711 HC HIGH FLOW OXYGEN

## 2021-04-23 PROCEDURE — 85379 FIBRIN DEGRADATION QUANT: CPT

## 2021-04-23 PROCEDURE — 5A09457 ASSISTANCE WITH RESPIRATORY VENTILATION, 24-96 CONSECUTIVE HOURS, CONTINUOUS POSITIVE AIRWAY PRESSURE: ICD-10-PCS | Performed by: INTERNAL MEDICINE

## 2021-04-23 PROCEDURE — 82728 ASSAY OF FERRITIN: CPT

## 2021-04-23 PROCEDURE — 97116 GAIT TRAINING THERAPY: CPT

## 2021-04-23 PROCEDURE — 65660000001 HC RM ICU INTERMED STEPDOWN

## 2021-04-23 PROCEDURE — 05HC33Z INSERTION OF INFUSION DEVICE INTO LEFT BASILIC VEIN, PERCUTANEOUS APPROACH: ICD-10-PCS | Performed by: INTERNAL MEDICINE

## 2021-04-23 PROCEDURE — 85025 COMPLETE CBC W/AUTO DIFF WBC: CPT

## 2021-04-23 PROCEDURE — 36410 VNPNXR 3YR/> PHY/QHP DX/THER: CPT

## 2021-04-23 PROCEDURE — 74011000258 HC RX REV CODE- 258: Performed by: INTERNAL MEDICINE

## 2021-04-23 PROCEDURE — 76937 US GUIDE VASCULAR ACCESS: CPT

## 2021-04-23 PROCEDURE — C1751 CATH, INF, PER/CENT/MIDLINE: HCPCS

## 2021-04-23 PROCEDURE — 94660 CPAP INITIATION&MGMT: CPT

## 2021-04-23 PROCEDURE — 83615 LACTATE (LD) (LDH) ENZYME: CPT

## 2021-04-23 PROCEDURE — 94760 N-INVAS EAR/PLS OXIMETRY 1: CPT

## 2021-04-23 PROCEDURE — 94640 AIRWAY INHALATION TREATMENT: CPT

## 2021-04-23 PROCEDURE — 83880 ASSAY OF NATRIURETIC PEPTIDE: CPT

## 2021-04-23 PROCEDURE — 97535 SELF CARE MNGMENT TRAINING: CPT

## 2021-04-23 PROCEDURE — 86140 C-REACTIVE PROTEIN: CPT

## 2021-04-23 PROCEDURE — 74011250636 HC RX REV CODE- 250/636: Performed by: NURSE PRACTITIONER

## 2021-04-23 PROCEDURE — 74011250637 HC RX REV CODE- 250/637: Performed by: HOSPITALIST

## 2021-04-23 PROCEDURE — 74011250636 HC RX REV CODE- 250/636: Performed by: PHYSICIAN ASSISTANT

## 2021-04-23 PROCEDURE — 80053 COMPREHEN METABOLIC PANEL: CPT

## 2021-04-23 PROCEDURE — 82962 GLUCOSE BLOOD TEST: CPT

## 2021-04-23 PROCEDURE — 77030018719 HC DRSG PTCH ANTIMIC J&J -A

## 2021-04-23 PROCEDURE — 77030020847 HC STATLOK BARD -A

## 2021-04-23 PROCEDURE — 74011636637 HC RX REV CODE- 636/637: Performed by: HOSPITALIST

## 2021-04-23 PROCEDURE — 74011250637 HC RX REV CODE- 250/637: Performed by: INTERNAL MEDICINE

## 2021-04-23 PROCEDURE — 74011250637 HC RX REV CODE- 250/637: Performed by: NURSE PRACTITIONER

## 2021-04-23 RX ADMIN — INSULIN LISPRO 2 UNITS: 100 INJECTION, SOLUTION INTRAVENOUS; SUBCUTANEOUS at 07:07

## 2021-04-23 RX ADMIN — Medication 1 CAPSULE: at 08:57

## 2021-04-23 RX ADMIN — Medication 2000 UNITS: at 08:58

## 2021-04-23 RX ADMIN — METHYLPREDNISOLONE SODIUM SUCCINATE 80 MG: 125 INJECTION, POWDER, FOR SOLUTION INTRAMUSCULAR; INTRAVENOUS at 11:47

## 2021-04-23 RX ADMIN — ALBUTEROL SULFATE 2 PUFF: 90 AEROSOL, METERED RESPIRATORY (INHALATION) at 20:09

## 2021-04-23 RX ADMIN — ACETAMINOPHEN 650 MG: 325 TABLET, FILM COATED ORAL at 16:32

## 2021-04-23 RX ADMIN — INSULIN LISPRO 2 UNITS: 100 INJECTION, SOLUTION INTRAVENOUS; SUBCUTANEOUS at 16:32

## 2021-04-23 RX ADMIN — METHYLPREDNISOLONE SODIUM SUCCINATE 80 MG: 125 INJECTION, POWDER, FOR SOLUTION INTRAMUSCULAR; INTRAVENOUS at 05:20

## 2021-04-23 RX ADMIN — ENOXAPARIN SODIUM 40 MG: 100 INJECTION SUBCUTANEOUS at 16:32

## 2021-04-23 RX ADMIN — Medication 10 ML: at 23:01

## 2021-04-23 RX ADMIN — FUROSEMIDE 40 MG: 10 INJECTION, SOLUTION INTRAMUSCULAR; INTRAVENOUS at 11:46

## 2021-04-23 RX ADMIN — REMDESIVIR 200 MG: 100 INJECTION, POWDER, LYOPHILIZED, FOR SOLUTION INTRAVENOUS at 23:00

## 2021-04-23 RX ADMIN — METHYLPREDNISOLONE SODIUM SUCCINATE 80 MG: 125 INJECTION, POWDER, FOR SOLUTION INTRAMUSCULAR; INTRAVENOUS at 17:38

## 2021-04-23 RX ADMIN — ALBUTEROL SULFATE 2 PUFF: 90 AEROSOL, METERED RESPIRATORY (INHALATION) at 15:13

## 2021-04-23 RX ADMIN — OXYCODONE HYDROCHLORIDE AND ACETAMINOPHEN 500 MG: 500 TABLET ORAL at 08:57

## 2021-04-23 RX ADMIN — ALBUTEROL SULFATE 2 PUFF: 90 AEROSOL, METERED RESPIRATORY (INHALATION) at 11:41

## 2021-04-23 RX ADMIN — Medication 10 ML: at 05:21

## 2021-04-23 RX ADMIN — ENOXAPARIN SODIUM 40 MG: 100 INJECTION SUBCUTANEOUS at 05:20

## 2021-04-23 RX ADMIN — Medication 10 ML: at 14:00

## 2021-04-23 RX ADMIN — OXYCODONE HYDROCHLORIDE AND ACETAMINOPHEN 500 MG: 500 TABLET ORAL at 17:38

## 2021-04-23 RX ADMIN — ALBUTEROL SULFATE 2 PUFF: 90 AEROSOL, METERED RESPIRATORY (INHALATION) at 07:18

## 2021-04-23 RX ADMIN — HYDROCHLOROTHIAZIDE: 25 TABLET ORAL at 08:58

## 2021-04-23 RX ADMIN — ALBUTEROL SULFATE 2 PUFF: 90 AEROSOL, METERED RESPIRATORY (INHALATION) at 04:38

## 2021-04-23 RX ADMIN — CITALOPRAM HYDROBROMIDE 40 MG: 20 TABLET ORAL at 08:57

## 2021-04-23 NOTE — PROCEDURES
Midline Insertion and Progress Note    PRE-PROCEDURE VERIFICATION  Correct Procedure: yes  Correct Site:  yes  Temperature: Temp: 98.1 °F (36.7 °C), Temperature Source: Temp Source: Oral  Recent Labs     04/23/21  0551 04/20/21 1912 04/20/21 1912   BUN 42*   < >  --    CREA 1.15   < >  --       < >  --    INR  --   --  1.0   WBC 8.4   < >  --     < > = values in this interval not displayed. Allergies: Penicillins    PROCEDURE DETAIL  A single lumen midline IV catheter was started for vascular access. The following documentation is in addition to the Midline properties in the lines/airways flowsheet :  Xylocaine 1% used intradermally  Lot #: [id number]  Catheter Total Length: 13 (cm)  External Catheter Length: 1 (cm)  Circumference: 40 (cm)  Vein Selection for Midline :left basilic  Complication Related to Insertion: excessive bleeding at site, pressure held, secure port and external dressing placed; bleeding controlled at this time    Line is okay to use.   April, RN notified

## 2021-04-23 NOTE — PROGRESS NOTES
Problem: Self Care Deficits Care Plan (Adult)  Goal: *Acute Goals and Plan of Care (Insert Text)  Description:   FUNCTIONAL STATUS PRIOR TO ADMISSION: Patient was independent and active without use of DME. Patient was independent for basic and instrumental ADLs, working full time in [employer], driving, etc.     HOME SUPPORT: The patient lived with his son (who also works full time) but did not require assist.    Occupational Therapy Goals  Initiated 4/22/2021  1. Patient will perform grooming EOB with minimal assistance/contact guard assist within 7 day(s). 2.  Patient will perform seated bathing with minimal assistance/contact guard assist and rest breaks PRN within 7 day(s). 3.  Patient will perform lower body dressing with minimal assistance/contact guard assist & adaptive techniques PRN within 7 day(s). 4.  Patient will perform toilet transfers to/from [facility] with minimal assistance/contact guard assist within 7 day(s). 5.  Patient will perform all aspects of toileting with moderate assistance within 7 day(s). 6.  Patient will participate in upper extremity therapeutic exercise/activities with supervision/set-up for 5 minutes with rest breaks PRN within 7 day(s). 7.  Patient will utilize energy conservation techniques during functional activities with verbal and visual cues within 7 day(s). Outcome: Progressing Towards Goal     OCCUPATIONAL THERAPY TREATMENT  Patient: Fabi Parker (54 y.o. male)  Date: 4/23/2021  Diagnosis: [facility] [J96.90]  COVID-19 [U07.1] <principal problem not specified>       Precautions: (Droplet Plus)  Chart, occupational therapy assessment, plan of care, and goals were reviewed. ASSESSMENT  Patient continues with skilled OT services and is progressing towards goals, however remains limited by decreased strength, RLE ROM, and functional endurance noted with toileting, upper body dressing, and OOB mobility this session.  Much improved oxygen tolerance, arrived with patient on CPAP with no desaturations with functional activity, largely Mod I-SBA for transfer to the chair and setup for ADL tasks. Transitioned to [facility] for lunch with O2 saturation remaining >90%. Hopeful for good progression and O2 weaning to safely transition home with [facility] and family assist PRN. Current Level of Function Impacting Discharge (ADLs): Mod I-SBA for ADLs and OOB mobility    Other factors to consider for discharge: oxygen dependence, high PLOF, PMH         PLAN :  Patient continues to benefit from skilled intervention to address the above impairments. Continue treatment per established plan of care to address goals. Recommend with staff: Recommend with nursing, ADLs with supervision/setup, OOB to chair 3x/day and toileting via  bariatric BSC . Thank you for completing as able in order to maintain patient strength, endurance and independence. Recommendation for discharge: (in order for the patient to meet his/her long term goals)  Occupational therapy at least 2 days/week in the home pending O2 weaning    This discharge recommendation:  A follow-up discussion with the attending provider and/or case management is planned    IF patient discharges home will need the following DME: To be determined (TBD)--oxygen at the least         SUBJECTIVE:   [facility].  re: up in the chair    OBJECTIVE DATA SUMMARY:   Cognitive/Behavioral Status:  Neurologic State: Alert  Orientation Level: Oriented X4  Cognition: Appropriate for age attention/concentration; Follows commands  Perception: Appears intact  Perseveration: No perseveration noted  Safety/Judgement: Awareness of environment; Fall prevention    Functional Mobility and Transfers for ADLs:  Bed Mobility:  Supine to Sit: Modified independent  Scooting: Modified independent    Transfers:  Sit to Stand: Modified independent     Bed to Chair: Stand-by assistance    Balance:  Sitting: Intact  Standing: Intact; Without support    ADL Intervention:    Upper Body Dressing Assistance  Dressing Assistance: Set-up  Hospital Gown: Set-up    Lower [address] on [address] Without Back: [address] [place]: Set-up  Bladder Hygiene: Set-up(urinal use in supine)    Cognitive Retraining  Safety/Judgement: Awareness of environment; Fall prevention      Pain:  None reported    Activity Tolerance:   Good on CPAP & HFNC    After treatment patient left in no apparent distress:   Sitting in chair and Call bell within reach    COMMUNICATION/COLLABORATION:   The patients plan of care was discussed with: Physical therapy assistant and Registered nurse.      BIBI Gomez, OTR/L  Time Calculation: 26 mins

## 2021-04-23 NOTE — CONSULTS
Infectious Disease Consult    Today's Date: 4/23/2021   Admit Date: 4/20/2021    Impression:   · COVID 19 pneumonia  · Respiratory failure  · Possible benefit from remdesivir    Plan:   · Initiate remdesivir therapy  · Follow renal/hepatic function  · PRN this weekend    Anti-infectives:   · None     Subjective:   Date of Consultation:  April 23, 2021  Referring Physician: Dr Tiffanie Mulligan    Patient is a 48 y.o. male admitted with COVID pneumonia and hypoxia. Symptoms probably began 8 days ago, with the onset of fever. He is an asthmatic. He did receive Actemra earlier. He has no known exposure to COVID. We are asked to comment on remdesivir therapy. Patient Active Problem List   Diagnosis Code    Respiratory failure ([facility]) J96.90    COVID-19 U07.1     Past Medical History:   Diagnosis Date    Asthma     High cholesterol     Hypertension     Sleep apnea       No family history on file. Social History     Tobacco Use    Smoking status: Never Smoker    Smokeless tobacco: Current User   Substance Use Topics    Alcohol use: Never     Frequency: Never     Past Surgical History:   Procedure Laterality Date    HX HERNIA REPAIR      HX OPEN REDUCTION INTERNAL FIXATION      rt leg      Prior to Admission medications    Medication Sig Start Date End Date Taking?  Authorizing Provider   atorvastatin (LIPITOR) 20 mg tablet TAKE 1 TABLET BY MOUTH EVERY DAY 3/16/21  Yes Provider, Historical   montelukast (SINGULAIR) 10 mg tablet TAKE 1 TABLET BY MOUTH EVERY DAY 3/16/21  Yes Provider, Historical   citalopram (CELEXA) 40 mg tablet TAKE ONE TABLET BY MOUTH ONCE DAILY 2/5/21   Provider, Historical   valsartan-hydroCHLOROthiazide (DIOVAN-HCT) 320-25 mg per tablet TAKE 1 TABLET BY MOUTH EVERY DAY 3/20/21   Provider, Historical   albuterol (PROVENTIL HFA, VENTOLIN HFA, PROAIR HFA) 90 mcg/actuation inhaler TAKE 2 PUFFS EVERY 6 HOURS AS NEEDED FOR WHEEZING 4/12/21   Provider, Historical       Allergies   Allergen Reactions    Penicillins Unknown (comments)        Review of Systems:  A comprehensive review of systems was negative except for that written in the History of Present Illness. Objective:     Visit Vitals  /75 (BP 1 Location: Left lower arm, BP Patient Position: Sitting)   Pulse 70   Temp 98.7 °F (37.1 °C)   Resp 17   Wt (!) 161.4 kg (355 lb 14.4 oz)   SpO2 98%   BMI 51.07 kg/m²     Temp (24hrs), Av.3 °F (36.8 °C), Min:97.8 °F (36.6 °C), Max:98.9 °F (37.2 °C)       Lines:  PIV    Physical Exam:  Lungs:  clear to auscultation bilaterally  Heart:  regular rate and rhythm  Abdomen:  soft, non-tender.  Bowel sounds normal. No masses,  no organomegaly  Skin:  no rash or abnormalities    Data Review:     CBC:  Recent Labs     21  0551 21  0614   WBC 8.4 6.2   GRANS 90* 89*   MONOS 3* 2*   EOS 0 0   ANEU 7.5 5.5   ABL 0.5* 0.5*   HGB 14.4 14.4   HCT 43.8 44.2    251       BMP:  Recent Labs     21  0551 21  0614 21  1409   CREA 1.15 1.08 1.03   BUN 42* 34* 30*    139 139   K 4.0 3.9 3.7    105 105   CO2 27 28 27   AGAP 9 6 7   * 116* 124*       LFTS:  Recent Labs     21  0551 21  0614 21  1409   TBILI 0.5 0.6 0.4   ALT 53 55 50    106 98   TP 6.7 7.8 7.3   ALB 2.8* 2.7* 2.5*       Microbiology:     All Micro Results     Procedure Component Value Units Date/Time    CULTURE, BLOOD, PAIRED [[id number]] Collected: 21 1630    Order Status: Canceled Specimen: Blood           Imaging:   Reviewed     Signed By: Latrice Desai MD     2021

## 2021-04-23 NOTE — PROGRESS NOTES
Occupational Therapy  04/23/21    Chart reviewed, noted patient getting sterile procedure at this time, will follow up for OT treatment as able & appropriate.      Thank you,   Ellis Camarillo OTLEONIDAS, OTR/L

## 2021-04-23 NOTE — PROGRESS NOTES
[facility] Adult  Hospitalist Group                                                                                          Hospitalist Progress Note  Patel Simon MD  Answering service: [phone] OR [phone] from in house phone        Date of Service:  2021  NAME:  Americo Jin  :  1970  MRN:  [id number]      Admission Summary:   48 y.o. male with past medical history of asthma, hypertension, hyperlipidemia and morbid obesity presented to [facility] ED this morning with increased shortness of breath. Interval history / Subjective:   Patient seen and examined this afternoon. Patient up sitting on a chair, feeling better than yesterday. He is still on high flow 60L, no increased work of breathing. No chest pain or swelling      Assessment & Plan:     # Acute hypoxemic respiratory failure  # COVID 19 pneumonia  - on high flow   - switched decadron to Solumedrol   - s/p actemra   - on vit c and Zinc   - diuresis on IV lasix   - follow daily inflammatory markers   - pulmonologist following  - low procal,does not need abx  - wean down oxygen as tolerated   - Per hospital policy patient not a candidate for remdesivir ( sx onset >6 days), pharmacy prefers ID consult. ID consulted       # HTN:-BP wnl, hold antihypertensives  # Super morbid obesity: BMI 53  # Depression: celexa  # KCB:CSLC  # Morbid Obesity       Code status: full  DVT prophylaxis: lovenox    Care Plan discussed with: patient seen and examined  Anticipated Disposition:tbd  Discharge: Home when medically ready      Hospital Problems  Never Reviewed          Codes Class Noted POA    Respiratory failure ([facility] [address] ICD-10-CM: J96.90  ICD-9-CM: 518.81  2021 Unknown        COVID-19 ICD-10-CM: U07.1  ICD-9-CM: 079.89  2021 Unknown                Review of Systems:   Pertinent positive mentioned in interval hx/HPI. Other systems reviewed and negative     Vital Signs:    Last 24hrs VS reviewed since prior progress note.  Most recent are:  Visit Vitals  /75 (BP 1 Location: Left lower arm, BP Patient Position: Sitting)   Pulse 70   Temp 98.7 °F (37.1 °C)   Resp 17   Wt (!) 161.4 kg (355 lb 14.4 oz)   SpO2 98%   BMI 51.07 kg/m²         Intake/Output Summary (Last 24 hours) at 4/23/2021 1551  Last data filed at 4/23/2021 1435  Gross per 24 hour   Intake 820 ml   Output 1100 ml   Net -280 ml        Physical Examination:     I had a face to face encounter with this patient and independently examined them on 4/23/2021 as outlined below:          Constitutional:  No acute distress, obese    ENT:  Oral mucosa moist, oropharynx benign. Resp:  good air entry bilateral , no wheezing,no accessory muscle use   CV:  Regular rhythm, normal rate, S1,S2 wnl    GI:  Soft, non distended, obese    Musculoskeletal:  No LE edema    Neurologic:  Moves all extremities. AAOx3     Psych:  not anxious nor agitated. Skin: no rashes or ulcers       Data Review:     I personally reviewed labs and imaging     Labs:     Recent Labs     04/23/21 0551 04/22/21 0614   WBC 8.4 6.2   HGB 14.4 14.4   HCT 43.8 44.2    251     Recent Labs     04/23/21  0551 04/22/21  0614 04/21/21  1409 04/20/21 1912    139 139  --    K 4.0 3.9 3.7  --     105 105  --    CO2 27 28 27  --    BUN 42* 34* 30*  --    CREA 1.15 1.08 1.03  --    * 116* 124*  --    CA 8.7 8.9 8.9  --    MG  --   --   --  2.9*     Recent Labs     04/23/21  0551 04/22/21  0614 04/21/21  1409   ALT 53 55 50    106 98   TBILI 0.5 0.6 0.4   TP 6.7 7.8 7.3   ALB 2.8* 2.7* 2.5*   GLOB 3.9 5.1* 4.8*     Recent Labs     04/20/21 1912   INR 1.0   PTP 10.4   APTT 31.7*      Recent Labs     04/23/21  0551   FERR 2,008*      No results found for: FOL, RBCF   No results for input(s): PH, PCO2, PO2 in the last 72 hours.   Recent Labs     04/20/21 1912   TROIQ <0.05     No results found for: CHOL, CHOLX, CHLST, CHOLV, HDL, HDLP, LDL, LDLC, DLDLP, TGLX, TRIGL, TRIGP, CHHD, CHHDX  Lab Results Component Value Date/Time    Glucose (POC) 137 (H) 04/23/2021 11:45 AM    Glucose (POC) 149 (H) 04/23/2021 06:07 AM    Glucose (POC) 133 (H) 04/22/2021 10:26 PM    Glucose (POC) 127 (H) 04/22/2021 04:48 PM    Glucose (POC) 112 (H) 04/22/2021 11:46 AM     Lab Results   Component Value Date/Time    Color YELLOW/STRAW 04/21/2021 04:16 AM    Appearance CLEAR 04/21/2021 04:16 AM    Specific gravity 1.030 04/21/2021 04:16 AM    pH (UA) 5.0 04/21/2021 04:16 AM    Protein TRACE (A) 04/21/2021 04:16 AM    Glucose Negative 04/21/2021 04:16 AM    Ketone Negative 04/21/2021 04:16 AM    Bilirubin Negative 04/21/2021 04:16 AM    Urobilinogen 0.2 04/21/2021 04:16 AM    Nitrites Negative 04/21/2021 04:16 AM    Leukocyte Esterase Negative 04/21/2021 04:16 AM    Epithelial cells FEW 04/21/2021 04:16 AM    Bacteria Negative 04/21/2021 04:16 AM    WBC 0-4 04/21/2021 04:16 AM    RBC 0-5 04/21/2021 04:16 AM         Medications Reviewed:     Current Facility-Administered Medications   Medication Dose Route Frequency    methylPREDNISolone (PF) (SOLU-MEDROL) injection 80 mg  80 mg IntraVENous Q6H    furosemide (LASIX) injection 40 mg  40 mg IntraVENous DAILY    losartan/hydroCHLOROthiazide (HYZAAR) 100/25 mg   Oral DAILY    albuterol (PROVENTIL HFA, VENTOLIN HFA, PROAIR HFA) inhaler 2 Puff  2 Puff Inhalation Q4H RT    insulin lispro (HUMALOG) injection   SubCUTAneous AC&HS    glucose chewable tablet 16 g  4 Tab Oral PRN    dextrose (D50W) injection syrg 12.5-25 g  12.5-25 g IntraVENous PRN    glucagon (GLUCAGEN) injection 1 mg  1 mg IntraMUSCular PRN    citalopram (CELEXA) tablet 40 mg  40 mg Oral DAILY    enoxaparin (LOVENOX) injection 40 mg  40 mg SubCUTAneous Q12H    acetaminophen (TYLENOL) tablet 650 mg  650 mg Oral Q6H PRN    Or    acetaminophen (TYLENOL) suppository 650 mg  650 mg Rectal Q6H PRN    guaiFENesin-dextromethorphan (ROBITUSSIN DM) 100-10 mg/5 mL syrup 5 mL  5 mL Oral Q4H PRN    cholecalciferol (VITAMIN D3) (1000 Units /25 mcg) tablet 2,000 Units  2,000 Units Oral DAILY    ascorbic acid (vitamin C) (VITAMIN C) tablet 500 mg  500 mg Oral BID    zinc sulfate (ZINCATE) 50 mg zinc (220 mg) capsule 1 Cap  1 Cap Oral DAILY    sodium chloride (NS) flush 5-40 mL  5-40 mL IntraVENous Q8H    sodium chloride (NS) flush 5-40 mL  5-40 mL IntraVENous PRN     ______________________________________________________________________  EXPECTED LENGTH OF STAY: 5d 9h  ACTUAL LENGTH OF STAY:          3                 Mala Oleary MD

## 2021-04-23 NOTE — PROGRESS NOTES
patient was offered to be switched to CPAP. He said he prefers to stay on high flow. will keep monitoring

## 2021-04-23 NOTE — PROGRESS NOTES
IV infiltrated and removed. Multiple attempts. PICC team consulted and will evaluate. Oncoming Rn aware. Bedside and Verbal shift change report given to Indiana Mckinney and April Sagrario (oncoming nurse) by Shabbir Goodman (offgoing nurse).  Report included the following information SBAR, Kardex, ED Summary, Procedure Summary, Intake/Output, MAR, Recent Results and Cardiac Rhythm SB.

## 2021-04-23 NOTE — PROGRESS NOTES
Problem: Mobility Impaired (Adult and Pediatric)  Goal: *Acute Goals and Plan of Care (Insert Text)  Description: FUNCTIONAL STATUS PRIOR TO ADMISSION: Patient was independent and active without use of DME.    HOME SUPPORT PRIOR TO ADMISSION: The patient lived with family but did not require assist.    Physical Therapy Goals  Initiated 4/22/2021  1. Patient will move from supine to sit and sit to supine , scoot up and down, and roll side to side in bed with modified independence within 7 day(s). 2.  Patient will transfer from bed to chair and chair to bed with modified independence using the least restrictive device within 7 day(s). 3.  Patient will perform sit to stand with minimal assistance/contact guard assist within 7 day(s). 4.  Patient will ambulate with minimal assistance/contact guard assist for 10 feet with the least restrictive device within 7 day(s). 5.  Patient will ascend/descend 2 stairs with one handrail(s) with minimal assistance/contact guard assist within 7 day(s). Outcome: Progressing Towards Goal    PHYSICAL THERAPY TREATMENT  Patient: Jailene Ramirez (54 y.o. male)  Date: 4/23/2021  Diagnosis: [facility] [J96.90]  COVID-19 [U07.1] <principal problem not specified>       Precautions: (Droplet Plus)  Chart, physical therapy assessment, plan of care and goals were reviewed. ASSESSMENT  Patient continues with skilled PT services and is progressing towards goals. Pt was able to increase mobility. Pt initially on CPAP. Pt took the mask off to get a drink. SpO2 decreased to 90% but recovered with return of mask. Pt moved easily to the chair. Pt was switched to high flow to eat SpO2 99% on FiO2 60% will continue to progress as oxygen needs allow.   .     Current Level of Function Impacting Discharge (mobility/balance): mod I    Other factors to consider for discharge: oxygen needs         PLAN :  Patient continues to benefit from skilled intervention to address the above impairments. Continue treatment per established plan of care. to address goals. Recommendation for discharge: (in order for the patient to meet his/her long term goals)  To be determined: depending on progression HHPT vs rehab     This discharge recommendation:  Has not yet been discussed the attending provider and/or case management    IF patient discharges home will need the following DME: portable oxygen       SUBJECTIVE:   Patient stated now what.     OBJECTIVE DATA SUMMARY:   Critical Behavior:  Neurologic State: Alert  Orientation Level: Oriented X4  Cognition: Follows commands  Safety/Judgement: Awareness of environment, Fall prevention  Functional Mobility Training:  Bed Mobility:     Supine to Sit: Modified independent              Transfers:  Sit to Stand: Modified independent  Stand to Sit: Modified independent                             Balance:  Sitting: Intact  Standing: Intact  Ambulation/Gait Training:                                                       Stairs: Therapeutic Exercises: Activity Tolerance:   limited    After treatment patient left in no apparent distress:   Sitting in chair and Call bell within reach    COMMUNICATION/COLLABORATION:   The patients plan of care was discussed with: Physical therapist, Occupational therapist, and Registered nurse.      Delfin Cortes PTA   Time Calculation: 20 mins

## 2021-04-23 NOTE — PROGRESS NOTES
Name: [facility]: [address]   : 1970 Admit Date: 2021   Phone: [phone]  Room: [facility]   PCP: None  MRN: [id number]   Date: 2021  Code: Full Code        HPI:      Going back and forth with NIV and high flow 60lpm and 90% Fio2  D dimer 0.55  Ferritin   CRP 4.53  probnp 44      CRP 9.33  Now on NIV/high flow  Could not receive remdesivir due to hospital policy   Notable desaturations last night and this am  Feels ok right now  Dicussed with RN       CRP 17.8  Still on 6lpm  Feels about the same   Day 7 of dx       5:44 PM       History was obtained from patient. I was asked by Terri Andersen MD to see Edwin Scott in consultation for a chief complaint of shortness of breath. History of Present Illness: 48year old male with past medical history of asthma, allergic rhinitis and morbid obesity who presented to [facility] with increased shortness of breath. Diagnosed with COVID-19 1 week back at [facility]. Last fever 7 days back. Does have cough, shortness of breath and wheezing. On prednisone 20 mg q daily. Data:  Hgb 6.8  Wbc 14  Plt 202  D-dimer 0.79  Cr 1.41  Trop < 0.05  nt pro bnp 25  CRP pending. CXR - bilateral infiltrates. Past Medical History:   Diagnosis Date    Asthma     High cholesterol     Hypertension     Sleep apnea        Past Surgical History:   Procedure Laterality Date    HX HERNIA REPAIR      HX OPEN REDUCTION INTERNAL FIXATION      rt leg       No family history on file.     Social History     Tobacco Use    Smoking status: Never Smoker    Smokeless tobacco: Current User   Substance Use Topics    Alcohol use: Never     Frequency: Never       Allergies   Allergen Reactions    Penicillins Unknown (comments)       Current Facility-Administered Medications   Medication Dose Route Frequency    methylPREDNISolone (PF) (SOLU-MEDROL) injection 80 mg  80 mg IntraVENous Q6H    furosemide (LASIX) injection 40 mg  40 mg IntraVENous DAILY    losartan/hydroCHLOROthiazide (HYZAAR) 100/25 mg   Oral DAILY    albuterol (PROVENTIL HFA, VENTOLIN HFA, PROAIR HFA) inhaler 2 Puff  2 Puff Inhalation Q4H RT    insulin lispro (HUMALOG) injection   SubCUTAneous AC&HS    glucose chewable tablet 16 g  4 Tab Oral PRN    dextrose (D50W) injection syrg 12.5-25 g  12.5-25 g IntraVENous PRN    glucagon (GLUCAGEN) injection 1 mg  1 mg IntraMUSCular PRN    citalopram (CELEXA) tablet 40 mg  40 mg Oral DAILY    enoxaparin (LOVENOX) injection 40 mg  40 mg SubCUTAneous Q12H    acetaminophen (TYLENOL) tablet 650 mg  650 mg Oral Q6H PRN    Or    acetaminophen (TYLENOL) suppository 650 mg  650 mg Rectal Q6H PRN    guaiFENesin-dextromethorphan (ROBITUSSIN DM) 100-10 mg/5 mL syrup 5 mL  5 mL Oral Q4H PRN    cholecalciferol (VITAMIN D3) (1000 Units /25 mcg) tablet 2,000 Units  2,000 Units Oral DAILY    ascorbic acid (vitamin C) (VITAMIN C) tablet 500 mg  500 mg Oral BID    zinc sulfate (ZINCATE) 50 mg zinc (220 mg) capsule 1 Cap  1 Cap Oral DAILY    sodium chloride (NS) flush 5-40 mL  5-40 mL IntraVENous Q8H    sodium chloride (NS) flush 5-40 mL  5-40 mL IntraVENous PRN         REVIEW OF SYSTEMS   Negative except as stated in the HPI. Physical Exam:   Visit Vitals  BP (!) 163/81 (BP 1 Location: Right arm, BP Patient Position: At rest)   Pulse (!) 49   Temp 98.1 °F (36.7 °C)   Resp 20   Wt (!) 161.4 kg (355 lb 14.4 oz)   SpO2 98%   BMI 51.07 kg/m²     Patient has COVID-19 infection and examination was deferred. Only inspection was performed. General:  Alert, cooperative, no distress. Head:  Normocephalic. Eyes:  Conjunctivae/corneas clear. Nose: Nares normal. Septum midline. Extremities: Extremities normal, atraumatic, no cyanosis or edema.        Skin: Skin color, texture, turgor normal. No rashes or lesions       Neurologic: Grossly nonfocal       Lab Results   Component Value Date/Time    Sodium 140 04/23/2021 05:51 AM    Potassium 4.0 04/23/2021 05:51 AM    Chloride 104 04/23/2021 05:51 AM    CO2 27 04/23/2021 05:51 AM    BUN 42 (H) 04/23/2021 05:51 AM    Creatinine 1.15 04/23/2021 05:51 AM    Glucose 147 (H) 04/23/2021 05:51 AM    Calcium 8.7 04/23/2021 05:51 AM    Magnesium 2.9 (H) 04/20/2021 07:12 PM    Lactic acid 0.9 04/20/2021 07:12 PM       Lab Results   Component Value Date/Time    WBC 8.4 04/23/2021 05:51 AM    HGB 14.4 04/23/2021 05:51 AM    PLATELET 644 55/05/8957 05:51 AM    MCV 89.0 04/23/2021 05:51 AM       Lab Results   Component Value Date/Time    INR 1.0 04/20/2021 07:12 PM    aPTT 31.7 (H) 04/20/2021 07:12 PM    Alk. phosphatase 106 04/23/2021 05:51 AM    Protein, total 6.7 04/23/2021 05:51 AM    Albumin 2.8 (L) 04/23/2021 05:51 AM    Globulin 3.9 04/23/2021 05:51 AM       No results found for: PH, PHI, PCO2, PCO2I, PO2, PO2I, HCO3, HCO3I, FIO2, FIO2I    Lab Results   Component Value Date/Time    Troponin-I, Qt. <0.05 04/20/2021 07:12 PM        IMPRESSION:  ===========  -COVID-19.  -COVID-19 pneumonia. O + blood type. Vit D deficient   -hypoxemic respiratory failure. Worsening, suspected ARDS  -asthma.  -seasonal allergies.  -Obesity.     PLAN:  =====  -CRP / d-dimer daily.  -solu medrol continue at current dose   -lovenox bid dosing   -s/p actemra   -diuresis, continue    -O2 supplementation above 90%, high flow/NIV   -sleep prone > 8 hours.  -chest x-ray yesterday showed worsening infiltrates   -PRN over the weekend   -Patient is critically ill and at high risk for further decline, mechanical ventilation, and ICU admission due to [address] ARDS; CC time spent excluding procedures is > 35 minutes       Keven Randall PA-C

## 2021-04-23 NOTE — PROGRESS NOTES
Bedside shift change report given to [facility] 9967 (oncoming nurse) by Nilton Rea RN (offgoing nurse). Report included the following information SBAR, Intake/Output, MAR, Med Rec Status and Cardiac Rhythm nsr.

## 2021-04-23 NOTE — PROGRESS NOTES
Remdesivir Initiation Note    This patient meets criteria for initiation of remdesivir based on the following:  · Proven COVID-19  · Moderate disease (Requiring supplemental O2)  · Acceptable hepatic function (ALT within 5 times ULN)    Exclusion Criteria:   Severe disease requiring invasive or non-invasive mechanical ventilation (includes HFNC & BiPAP)   Could consider use in patients requiring high flow if early on in the disease course (based on symptom duration)   Use of more than 1 vasopressor prior to remdesivir initiation   Already improving on supportive treatment and/or impending discharge   Patients in whom the clinical team think death is in the immediate short-term where remdesivir is unlikely to change the clinical outcome     Liver function tests will be monitored daily while on remdesivir.

## 2021-04-24 ENCOUNTER — APPOINTMENT (OUTPATIENT)
Dept: NON INVASIVE DIAGNOSTICS | Age: 51
DRG: 177 | End: 2021-04-24
Attending: NURSE PRACTITIONER
Payer: COMMERCIAL

## 2021-04-24 LAB
BNP SERPL-MCNC: 27 PG/ML
CRP SERPL-MCNC: 2.51 MG/DL (ref 0–0.6)
D DIMER PPP FEU-MCNC: 0.68 MG/L FEU (ref 0–0.65)
GLUCOSE BLD STRIP.AUTO-MCNC: 147 MG/DL (ref 65–100)
GLUCOSE BLD STRIP.AUTO-MCNC: 151 MG/DL (ref 65–100)
GLUCOSE BLD STRIP.AUTO-MCNC: 177 MG/DL (ref 65–100)
GLUCOSE BLD STRIP.AUTO-MCNC: 236 MG/DL (ref 65–100)
SERVICE CMNT-IMP: ABNORMAL

## 2021-04-24 PROCEDURE — 85379 FIBRIN DEGRADATION QUANT: CPT

## 2021-04-24 PROCEDURE — 94760 N-INVAS EAR/PLS OXIMETRY 1: CPT

## 2021-04-24 PROCEDURE — 74011250637 HC RX REV CODE- 250/637: Performed by: NURSE PRACTITIONER

## 2021-04-24 PROCEDURE — 94660 CPAP INITIATION&MGMT: CPT

## 2021-04-24 PROCEDURE — 65660000001 HC RM ICU INTERMED STEPDOWN

## 2021-04-24 PROCEDURE — 82962 GLUCOSE BLOOD TEST: CPT

## 2021-04-24 PROCEDURE — 94640 AIRWAY INHALATION TREATMENT: CPT

## 2021-04-24 PROCEDURE — XW033E5 INTRODUCTION OF REMDESIVIR ANTI-INFECTIVE INTO PERIPHERAL VEIN, PERCUTANEOUS APPROACH, NEW TECHNOLOGY GROUP 5: ICD-10-PCS | Performed by: INTERNAL MEDICINE

## 2021-04-24 PROCEDURE — 74011250636 HC RX REV CODE- 250/636: Performed by: NURSE PRACTITIONER

## 2021-04-24 PROCEDURE — 74011000250 HC RX REV CODE- 250: Performed by: INTERNAL MEDICINE

## 2021-04-24 PROCEDURE — 74011250636 HC RX REV CODE- 250/636: Performed by: PHYSICIAN ASSISTANT

## 2021-04-24 PROCEDURE — 74011250636 HC RX REV CODE- 250/636: Performed by: INTERNAL MEDICINE

## 2021-04-24 PROCEDURE — 77010033711 HC HIGH FLOW OXYGEN

## 2021-04-24 PROCEDURE — 74011250637 HC RX REV CODE- 250/637: Performed by: HOSPITALIST

## 2021-04-24 PROCEDURE — 74011000258 HC RX REV CODE- 258: Performed by: INTERNAL MEDICINE

## 2021-04-24 PROCEDURE — 74011636637 HC RX REV CODE- 636/637: Performed by: HOSPITALIST

## 2021-04-24 PROCEDURE — 86140 C-REACTIVE PROTEIN: CPT

## 2021-04-24 PROCEDURE — 83880 ASSAY OF NATRIURETIC PEPTIDE: CPT

## 2021-04-24 PROCEDURE — 74011250637 HC RX REV CODE- 250/637: Performed by: INTERNAL MEDICINE

## 2021-04-24 PROCEDURE — 36415 COLL VENOUS BLD VENIPUNCTURE: CPT

## 2021-04-24 RX ADMIN — ALBUTEROL SULFATE 2 PUFF: 90 AEROSOL, METERED RESPIRATORY (INHALATION) at 15:21

## 2021-04-24 RX ADMIN — INSULIN LISPRO 2 UNITS: 100 INJECTION, SOLUTION INTRAVENOUS; SUBCUTANEOUS at 07:04

## 2021-04-24 RX ADMIN — METHYLPREDNISOLONE SODIUM SUCCINATE 80 MG: 125 INJECTION, POWDER, FOR SOLUTION INTRAMUSCULAR; INTRAVENOUS at 05:01

## 2021-04-24 RX ADMIN — OXYCODONE HYDROCHLORIDE AND ACETAMINOPHEN 500 MG: 500 TABLET ORAL at 08:28

## 2021-04-24 RX ADMIN — CITALOPRAM HYDROBROMIDE 40 MG: 20 TABLET ORAL at 08:28

## 2021-04-24 RX ADMIN — ACETAMINOPHEN 650 MG: 325 TABLET, FILM COATED ORAL at 11:19

## 2021-04-24 RX ADMIN — FUROSEMIDE 40 MG: 10 INJECTION, SOLUTION INTRAMUSCULAR; INTRAVENOUS at 08:28

## 2021-04-24 RX ADMIN — INSULIN LISPRO 2 UNITS: 100 INJECTION, SOLUTION INTRAVENOUS; SUBCUTANEOUS at 21:08

## 2021-04-24 RX ADMIN — Medication 10 ML: at 06:00

## 2021-04-24 RX ADMIN — ALBUTEROL SULFATE 2 PUFF: 90 AEROSOL, METERED RESPIRATORY (INHALATION) at 11:17

## 2021-04-24 RX ADMIN — ALBUTEROL SULFATE 2 PUFF: 90 AEROSOL, METERED RESPIRATORY (INHALATION) at 00:12

## 2021-04-24 RX ADMIN — Medication 10 ML: at 21:08

## 2021-04-24 RX ADMIN — Medication 1 CAPSULE: at 08:28

## 2021-04-24 RX ADMIN — METHYLPREDNISOLONE SODIUM SUCCINATE 60 MG: 125 INJECTION, POWDER, FOR SOLUTION INTRAMUSCULAR; INTRAVENOUS at 23:00

## 2021-04-24 RX ADMIN — OXYCODONE HYDROCHLORIDE AND ACETAMINOPHEN 500 MG: 500 TABLET ORAL at 18:06

## 2021-04-24 RX ADMIN — ALBUTEROL SULFATE 2 PUFF: 90 AEROSOL, METERED RESPIRATORY (INHALATION) at 04:01

## 2021-04-24 RX ADMIN — REMDESIVIR 100 MG: 100 INJECTION, POWDER, LYOPHILIZED, FOR SOLUTION INTRAVENOUS at 21:08

## 2021-04-24 RX ADMIN — METHYLPREDNISOLONE SODIUM SUCCINATE 60 MG: 125 INJECTION, POWDER, FOR SOLUTION INTRAMUSCULAR; INTRAVENOUS at 18:05

## 2021-04-24 RX ADMIN — INSULIN LISPRO 2 UNITS: 100 INJECTION, SOLUTION INTRAVENOUS; SUBCUTANEOUS at 18:05

## 2021-04-24 RX ADMIN — ALBUTEROL SULFATE 2 PUFF: 90 AEROSOL, METERED RESPIRATORY (INHALATION) at 19:13

## 2021-04-24 RX ADMIN — METHYLPREDNISOLONE SODIUM SUCCINATE 80 MG: 125 INJECTION, POWDER, FOR SOLUTION INTRAMUSCULAR; INTRAVENOUS at 00:28

## 2021-04-24 RX ADMIN — Medication 2000 UNITS: at 08:28

## 2021-04-24 RX ADMIN — INSULIN LISPRO 2 UNITS: 100 INJECTION, SOLUTION INTRAVENOUS; SUBCUTANEOUS at 13:50

## 2021-04-24 RX ADMIN — HYDROCHLOROTHIAZIDE: 25 TABLET ORAL at 08:28

## 2021-04-24 RX ADMIN — ENOXAPARIN SODIUM 40 MG: 100 INJECTION SUBCUTANEOUS at 18:05

## 2021-04-24 RX ADMIN — ALBUTEROL SULFATE 2 PUFF: 90 AEROSOL, METERED RESPIRATORY (INHALATION) at 07:21

## 2021-04-24 RX ADMIN — ENOXAPARIN SODIUM 40 MG: 100 INJECTION SUBCUTANEOUS at 04:47

## 2021-04-24 RX ADMIN — METHYLPREDNISOLONE SODIUM SUCCINATE 60 MG: 125 INJECTION, POWDER, FOR SOLUTION INTRAMUSCULAR; INTRAVENOUS at 10:19

## 2021-04-24 NOTE — ROUTINE PROCESS
Bedside shift change report given to [address] [place] [address] (oncoming nurse) by Harper Gregory (offgoing nurse). Report included the following information SBAR, Kardex, MAR and Cardiac Rhythm NSR/SB.

## 2021-04-24 NOTE — PROGRESS NOTES
[facility] Adult  Hospitalist Group                                                                                          Hospitalist Progress Note  Raul Olivo MD  Answering service: [phone] OR [phone] from in house phone        Date of Service:  2021  NAME:  Elaina Pallas  :  1970  MRN:  [id number]      Admission Summary:   48 y.o. male with past medical history of asthma, hypertension, hyperlipidemia and morbid obesity presented to [facility] ED this morning with increased shortness of breath. Interval history / Subjective:   Patient seen and examined this afternoon. Patient sleeping while wearing his CiPAP. He was cut down to 35L earlier and tolerating well. No new complaints. He is feeling better. Assessment & Plan:     # Acute hypoxemic respiratory failure  # COVID 19 pneumonia  - on high flow   - switched decadron to Solumedrol taper dose   - s/p actemra X 1  - started on Remdesivir   - on vit c and Zinc   - diuresis on IV lasix   - follow daily inflammatory markers   - pulmonologist following  - low procal,does not need abx  - wean down oxygen as tolerated   - Appt ID input       # HTN:-BP wnl, hold antihypertensives  # Super morbid obesity: BMI 53  # Depression: celexa  # VFM:RHEY  # Morbid Obesity       Code status: full  DVT prophylaxis: lovenox    Care Plan discussed with: patient seen and examined  Anticipated Disposition:tbd  Discharge: Home when medically ready      Hospital Problems  Never Reviewed          Codes Class Noted POA    Respiratory failure ([facility] [address] ICD-10-CM: J96.90  ICD-9-CM: 518.81  2021 Unknown        COVID-19 ICD-10-CM: U07.1  ICD-9-CM: 079.89  2021 Unknown                Review of Systems:   Pertinent positive mentioned in interval hx/HPI. Other systems reviewed and negative     Vital Signs:    Last 24hrs VS reviewed since prior progress note.  Most recent are:  Visit Vitals  BP (!) 172/74 (BP 1 Location: Right lower arm, BP Patient Position: At rest)   Pulse (!) 55   Temp 98.9 °F (37.2 °C)   Resp 22   Wt (!) 161.4 kg (355 lb 14.4 oz)   SpO2 92%   BMI 51.07 kg/m²         Intake/Output Summary (Last 24 hours) at 4/24/2021 1508  Last data filed at 4/24/2021 1020  Gross per 24 hour   Intake 1150 ml   Output 1550 ml   Net -400 ml        Physical Examination:     I had a face to face encounter with this patient and independently examined them on 4/24/2021 as outlined below:          Constitutional:  No acute distress, obese    ENT:  Oral mucosa moist, oropharynx benign. Resp:  good air entry bilateral , no wheezing,no accessory muscle use   CV:  Regular rhythm, normal rate, S1,S2 wnl    GI:  Soft, non distended, obese    Musculoskeletal:  No LE edema    Neurologic:  Moves all extremities. AAOx3     Psych:  not anxious nor agitated. Skin: no rashes or ulcers       Data Review:     I personally reviewed labs and imaging     Labs:     Recent Labs     04/23/21  0551 04/22/21  0614   WBC 8.4 6.2   HGB 14.4 14.4   HCT 43.8 44.2    251     Recent Labs     04/23/21  0551 04/22/21  0614    139   K 4.0 3.9    105   CO2 27 28   BUN 42* 34*   CREA 1.15 1.08   * 116*   CA 8.7 8.9     Recent Labs     04/23/21  0551 04/22/21  0614   ALT 53 55    106   TBILI 0.5 0.6   TP 6.7 7.8   ALB 2.8* 2.7*   GLOB 3.9 5.1*     No results for input(s): INR, PTP, APTT, INREXT, INREXT in the last 72 hours. Recent Labs     04/23/21  0551   FERR 2,008*      No results found for: FOL, RBCF   No results for input(s): PH, PCO2, PO2 in the last 72 hours. No results for input(s): CPK, CKNDX, TROIQ in the last 72 hours.     No lab exists for component: CPKMB  No results found for: CHOL, CHOLX, CHLST, CHOLV, HDL, HDLP, LDL, LDLC, DLDLP, TGLX, TRIGL, TRIGP, CHHD, CHHDX  Lab Results   Component Value Date/Time    Glucose (POC) 151 (H) 04/24/2021 11:28 AM    Glucose (POC) 177 (H) 04/24/2021 06:43 AM    Glucose (POC) 158 (H) 04/23/2021 11:47 PM    Glucose (POC) 199 (H) 04/23/2021 04:23 PM    Glucose (POC) 137 (H) 04/23/2021 11:45 AM     Lab Results   Component Value Date/Time    Color YELLOW/STRAW 04/21/2021 04:16 AM    Appearance CLEAR 04/21/2021 04:16 AM    Specific gravity 1.030 04/21/2021 04:16 AM    pH (UA) 5.0 04/21/2021 04:16 AM    Protein TRACE (A) 04/21/2021 04:16 AM    Glucose Negative 04/21/2021 04:16 AM    Ketone Negative 04/21/2021 04:16 AM    Bilirubin Negative 04/21/2021 04:16 AM    Urobilinogen 0.2 04/21/2021 04:16 AM    Nitrites Negative 04/21/2021 04:16 AM    Leukocyte Esterase Negative 04/21/2021 04:16 AM    Epithelial cells FEW 04/21/2021 04:16 AM    Bacteria Negative 04/21/2021 04:16 AM    WBC 0-4 04/21/2021 04:16 AM    RBC 0-5 04/21/2021 04:16 AM         Medications Reviewed:     Current Facility-Administered Medications   Medication Dose Route Frequency    methylPREDNISolone (PF) (Solu-MEDROL) injection 60 mg  60 mg IntraVENous Q6H    remdesivir 100 mg in 0.9% sodium chloride 250 mL IVPB  100 mg IntraVENous Q24H    furosemide (LASIX) injection 40 mg  40 mg IntraVENous DAILY    losartan/hydroCHLOROthiazide (HYZAAR) 100/25 mg   Oral DAILY    albuterol (PROVENTIL HFA, VENTOLIN HFA, PROAIR HFA) inhaler 2 Puff  2 Puff Inhalation Q4H RT    insulin lispro (HUMALOG) injection   SubCUTAneous AC&HS    glucose chewable tablet 16 g  4 Tab Oral PRN    dextrose (D50W) injection syrg 12.5-25 g  12.5-25 g IntraVENous PRN    glucagon (GLUCAGEN) injection 1 mg  1 mg IntraMUSCular PRN    citalopram (CELEXA) tablet 40 mg  40 mg Oral DAILY    enoxaparin (LOVENOX) injection 40 mg  40 mg SubCUTAneous Q12H    acetaminophen (TYLENOL) tablet 650 mg  650 mg Oral Q6H PRN    Or    acetaminophen (TYLENOL) suppository 650 mg  650 mg Rectal Q6H PRN    guaiFENesin-dextromethorphan (ROBITUSSIN DM) 100-10 mg/5 mL syrup 5 mL  5 mL Oral Q4H PRN    cholecalciferol (VITAMIN D3) (1000 Units /25 mcg) tablet 2,000 Units  2,000 Units Oral DAILY    ascorbic acid (vitamin C) (VITAMIN C) tablet 500 mg  500 mg Oral BID    zinc sulfate (ZINCATE) 50 mg zinc (220 mg) capsule 1 Cap  1 Cap Oral DAILY    sodium chloride (NS) flush 5-40 mL  5-40 mL IntraVENous Q8H    sodium chloride (NS) flush 5-40 mL  5-40 mL IntraVENous PRN     ______________________________________________________________________  EXPECTED LENGTH OF STAY: 5d 9h  ACTUAL LENGTH OF STAY:          4                 Mala Oleary MD

## 2021-04-25 LAB
ALBUMIN SERPL-MCNC: 2.7 G/DL (ref 3.5–5)
ALBUMIN SERPL-MCNC: 2.8 G/DL (ref 3.5–5)
ALBUMIN/GLOB SERPL: 0.6 {RATIO} (ref 1.1–2.2)
ALBUMIN/GLOB SERPL: 0.8 {RATIO} (ref 1.1–2.2)
ALP SERPL-CCNC: 97 U/L (ref 45–117)
ALP SERPL-CCNC: 99 U/L (ref 45–117)
ALT SERPL-CCNC: 73 U/L (ref 12–78)
ALT SERPL-CCNC: 74 U/L (ref 12–78)
ANION GAP SERPL CALC-SCNC: 4 MMOL/L (ref 5–15)
ANION GAP SERPL CALC-SCNC: 8 MMOL/L (ref 5–15)
AST SERPL-CCNC: 33 U/L (ref 15–37)
AST SERPL-CCNC: 35 U/L (ref 15–37)
BILIRUB SERPL-MCNC: 0.4 MG/DL (ref 0.2–1)
BILIRUB SERPL-MCNC: 0.5 MG/DL (ref 0.2–1)
BUN SERPL-MCNC: 49 MG/DL (ref 6–20)
BUN SERPL-MCNC: 50 MG/DL (ref 6–20)
BUN/CREAT SERPL: 42 (ref 12–20)
BUN/CREAT SERPL: 42 (ref 12–20)
CALCIUM SERPL-MCNC: 8.7 MG/DL (ref 8.5–10.1)
CALCIUM SERPL-MCNC: 8.8 MG/DL (ref 8.5–10.1)
CHLORIDE SERPL-SCNC: 104 MMOL/L (ref 97–108)
CHLORIDE SERPL-SCNC: 105 MMOL/L (ref 97–108)
CO2 SERPL-SCNC: 28 MMOL/L (ref 21–32)
CO2 SERPL-SCNC: 28 MMOL/L (ref 21–32)
CREAT SERPL-MCNC: 1.18 MG/DL (ref 0.7–1.3)
CREAT SERPL-MCNC: 1.18 MG/DL (ref 0.7–1.3)
CRP SERPL-MCNC: 1.56 MG/DL (ref 0–0.6)
D DIMER PPP FEU-MCNC: 0.7 MG/L FEU (ref 0–0.65)
FERRITIN SERPL-MCNC: 1470 NG/ML (ref 26–388)
GLOBULIN SER CALC-MCNC: 3.4 G/DL (ref 2–4)
GLOBULIN SER CALC-MCNC: 4.2 G/DL (ref 2–4)
GLUCOSE BLD STRIP.AUTO-MCNC: 134 MG/DL (ref 65–100)
GLUCOSE BLD STRIP.AUTO-MCNC: 159 MG/DL (ref 65–100)
GLUCOSE BLD STRIP.AUTO-MCNC: 163 MG/DL (ref 65–100)
GLUCOSE BLD STRIP.AUTO-MCNC: 172 MG/DL (ref 65–100)
GLUCOSE SERPL-MCNC: 158 MG/DL (ref 65–100)
GLUCOSE SERPL-MCNC: 165 MG/DL (ref 65–100)
LDH SERPL L TO P-CCNC: 526 U/L (ref 85–241)
LDH SERPL L TO P-CCNC: 529 U/L (ref 85–241)
POTASSIUM SERPL-SCNC: 3.6 MMOL/L (ref 3.5–5.1)
POTASSIUM SERPL-SCNC: 3.7 MMOL/L (ref 3.5–5.1)
PROT SERPL-MCNC: 6.2 G/DL (ref 6.4–8.2)
PROT SERPL-MCNC: 6.9 G/DL (ref 6.4–8.2)
SERVICE CMNT-IMP: ABNORMAL
SODIUM SERPL-SCNC: 137 MMOL/L (ref 136–145)
SODIUM SERPL-SCNC: 140 MMOL/L (ref 136–145)

## 2021-04-25 PROCEDURE — 74011250636 HC RX REV CODE- 250/636: Performed by: INTERNAL MEDICINE

## 2021-04-25 PROCEDURE — 94762 N-INVAS EAR/PLS OXIMTRY CONT: CPT

## 2021-04-25 PROCEDURE — 74011250636 HC RX REV CODE- 250/636: Performed by: NURSE PRACTITIONER

## 2021-04-25 PROCEDURE — 74011000250 HC RX REV CODE- 250: Performed by: INTERNAL MEDICINE

## 2021-04-25 PROCEDURE — 65660000001 HC RM ICU INTERMED STEPDOWN

## 2021-04-25 PROCEDURE — 74011636637 HC RX REV CODE- 636/637: Performed by: HOSPITALIST

## 2021-04-25 PROCEDURE — 74011250637 HC RX REV CODE- 250/637: Performed by: NURSE PRACTITIONER

## 2021-04-25 PROCEDURE — 80053 COMPREHEN METABOLIC PANEL: CPT

## 2021-04-25 PROCEDURE — 36415 COLL VENOUS BLD VENIPUNCTURE: CPT

## 2021-04-25 PROCEDURE — 94640 AIRWAY INHALATION TREATMENT: CPT

## 2021-04-25 PROCEDURE — 74011250637 HC RX REV CODE- 250/637: Performed by: INTERNAL MEDICINE

## 2021-04-25 PROCEDURE — 74011000258 HC RX REV CODE- 258: Performed by: INTERNAL MEDICINE

## 2021-04-25 PROCEDURE — 83615 LACTATE (LD) (LDH) ENZYME: CPT

## 2021-04-25 PROCEDURE — 82728 ASSAY OF FERRITIN: CPT

## 2021-04-25 PROCEDURE — 86140 C-REACTIVE PROTEIN: CPT

## 2021-04-25 PROCEDURE — 74011250636 HC RX REV CODE- 250/636: Performed by: PHYSICIAN ASSISTANT

## 2021-04-25 PROCEDURE — 77010033711 HC HIGH FLOW OXYGEN

## 2021-04-25 PROCEDURE — 82962 GLUCOSE BLOOD TEST: CPT

## 2021-04-25 PROCEDURE — 85379 FIBRIN DEGRADATION QUANT: CPT

## 2021-04-25 PROCEDURE — 74011250637 HC RX REV CODE- 250/637: Performed by: HOSPITALIST

## 2021-04-25 RX ORDER — ATORVASTATIN CALCIUM 20 MG/1
20 TABLET, FILM COATED ORAL DAILY
Status: DISCONTINUED | OUTPATIENT
Start: 2021-04-26 | End: 2021-05-06 | Stop reason: HOSPADM

## 2021-04-25 RX ADMIN — ALBUTEROL SULFATE 2 PUFF: 90 AEROSOL, METERED RESPIRATORY (INHALATION) at 04:07

## 2021-04-25 RX ADMIN — ENOXAPARIN SODIUM 40 MG: 100 INJECTION SUBCUTANEOUS at 03:58

## 2021-04-25 RX ADMIN — REMDESIVIR 100 MG: 100 INJECTION, POWDER, LYOPHILIZED, FOR SOLUTION INTRAVENOUS at 19:00

## 2021-04-25 RX ADMIN — HYDROCHLOROTHIAZIDE: 25 TABLET ORAL at 09:00

## 2021-04-25 RX ADMIN — CITALOPRAM HYDROBROMIDE 40 MG: 20 TABLET ORAL at 09:00

## 2021-04-25 RX ADMIN — ALBUTEROL SULFATE 2 PUFF: 90 AEROSOL, METERED RESPIRATORY (INHALATION) at 07:31

## 2021-04-25 RX ADMIN — OXYCODONE HYDROCHLORIDE AND ACETAMINOPHEN 500 MG: 500 TABLET ORAL at 09:00

## 2021-04-25 RX ADMIN — FUROSEMIDE 40 MG: 10 INJECTION, SOLUTION INTRAMUSCULAR; INTRAVENOUS at 09:00

## 2021-04-25 RX ADMIN — OXYCODONE HYDROCHLORIDE AND ACETAMINOPHEN 500 MG: 500 TABLET ORAL at 18:00

## 2021-04-25 RX ADMIN — Medication 2000 UNITS: at 09:00

## 2021-04-25 RX ADMIN — METHYLPREDNISOLONE SODIUM SUCCINATE 60 MG: 125 INJECTION, POWDER, FOR SOLUTION INTRAMUSCULAR; INTRAVENOUS at 04:02

## 2021-04-25 RX ADMIN — Medication 1 CAPSULE: at 09:00

## 2021-04-25 RX ADMIN — METHYLPREDNISOLONE SODIUM SUCCINATE 60 MG: 125 INJECTION, POWDER, FOR SOLUTION INTRAMUSCULAR; INTRAVENOUS at 23:00

## 2021-04-25 RX ADMIN — METHYLPREDNISOLONE SODIUM SUCCINATE 60 MG: 125 INJECTION, POWDER, FOR SOLUTION INTRAMUSCULAR; INTRAVENOUS at 11:00

## 2021-04-25 RX ADMIN — METHYLPREDNISOLONE SODIUM SUCCINATE 60 MG: 125 INJECTION, POWDER, FOR SOLUTION INTRAMUSCULAR; INTRAVENOUS at 17:00

## 2021-04-25 RX ADMIN — INSULIN LISPRO 2 UNITS: 100 INJECTION, SOLUTION INTRAVENOUS; SUBCUTANEOUS at 11:30

## 2021-04-25 RX ADMIN — ENOXAPARIN SODIUM 40 MG: 100 INJECTION SUBCUTANEOUS at 16:00

## 2021-04-25 RX ADMIN — ALBUTEROL SULFATE 2 PUFF: 90 AEROSOL, METERED RESPIRATORY (INHALATION) at 02:00

## 2021-04-25 RX ADMIN — ALBUTEROL SULFATE 2 PUFF: 90 AEROSOL, METERED RESPIRATORY (INHALATION) at 15:33

## 2021-04-25 RX ADMIN — INSULIN LISPRO 2 UNITS: 100 INJECTION, SOLUTION INTRAVENOUS; SUBCUTANEOUS at 16:30

## 2021-04-25 RX ADMIN — Medication 10 ML: at 06:00

## 2021-04-25 RX ADMIN — Medication 10 ML: at 22:00

## 2021-04-25 RX ADMIN — ALBUTEROL SULFATE 2 PUFF: 90 AEROSOL, METERED RESPIRATORY (INHALATION) at 20:00

## 2021-04-25 RX ADMIN — ALBUTEROL SULFATE 2 PUFF: 90 AEROSOL, METERED RESPIRATORY (INHALATION) at 11:20

## 2021-04-25 NOTE — PROGRESS NOTES
Bedside shift change report given to Leah Medrano RN (oncoming nurse) by SHARDA Ferris (offgoing nurse). Report included the following information SBAR, Kardex, Intake/Output, MAR, Accordion and Cardiac Rhythm Sinus gurdeep-Sinus Rhythm.

## 2021-04-25 NOTE — PROGRESS NOTES
[facility] Adult  Hospitalist Group                                                                                          Hospitalist Progress Note  West Arteaga MD  Answering service: [phone] OR [phone] from in house phone        Date of Service:  2021  NAME:  Chace Wei  :  1970  MRN:  [id number]      Admission Summary:   48 y.o. male with past medical history of asthma, hypertension, hyperlipidemia and morbid obesity presented to [facility] ED this morning with increased shortness of breath. Interval history / Subjective:   Patient seen and examined this morning. Patient feeling better all over. He is still on high flow 45L but maintaining his oxygenation in mid 90's. No other complaints. Inflammatory markers are coming down      Assessment & Plan:     # Acute hypoxemic respiratory failure  # COVID 19 pneumonia  - on high flow   - switched decadron to Solumedrol taper dose   - s/p actemra X 1  - continue on Remdesivir   - on vit c and Zinc   - diuresis on IV lasix   - follow daily inflammatory markers   - pulmonologist following  - low procal,does not need abx  - wean down oxygen as tolerated   - Appt ID input       # HTN:-BP trending up, on Losartan/Hctz combination   # Morbid obesity: BMI 53  # Depression: celexa  # YJM:ZTYM      Code status: Full  DVT prophylaxis: lovenox    Care Plan discussed with: patient seen and examined  Anticipated Disposition:tbd  Discharge: Home when medically ready      Hospital Problems  Never Reviewed          Codes Class Noted POA    Respiratory failure ([address] ICD-10-CM: J96.90  ICD-9-CM: 518.81  2021 Unknown        COVID-19 ICD-10-CM: U07.1  ICD-9-CM: 079.89  2021 Unknown                Review of Systems:   Pertinent positive mentioned in interval hx/HPI. Other systems reviewed and negative     Vital Signs:    Last 24hrs VS reviewed since prior progress note.  Most recent are:  Visit Vitals  BP (!) 153/84   Pulse (!) 56   Temp 97.8 °F (36.6 °C)   Resp 15   Wt (!) 161.4 kg (355 lb 14.4 oz)   SpO2 90%   BMI 51.07 kg/m²         Intake/Output Summary (Last 24 hours) at 4/25/2021 1334  Last data filed at 4/25/2021 6959  Gross per 24 hour   Intake 250 ml   Output 1300 ml   Net -1050 ml        Physical Examination:     I had a face to face encounter with this patient and independently examined them on 4/25/2021 as outlined below:          Constitutional:  No acute distress, obese    ENT:  Oral mucosa moist, oropharynx benign. Resp:  good air entry bilateral , no wheezing,no accessory muscle use   CV:  Regular rhythm, normal rate, S1,S2 wnl    GI:  Soft, non distended, obese    Musculoskeletal:  No LE edema    Neurologic:  Moves all extremities. AAOx3     Psych:  not anxious nor agitated. Skin: no rashes or ulcers       Data Review:     I personally reviewed labs and imaging     Labs:     Recent Labs     04/23/21  0551   WBC 8.4   HGB 14.4   HCT 43.8        Recent Labs     04/25/21 0357 04/23/21  0551     137 140   K 3.7  3.6 4.0     105 104   CO2 28  28 27   BUN 49*  50* 42*   CREA 1.18  1.18 1.15   *  158* 147*   CA 8.8  8.7 8.7     Recent Labs     04/25/21 0357 04/23/21  0551   ALT 73  74 53   AP 97  99 106   TBILI 0.5  0.4 0.5   TP 6.2*  6.9 6.7   ALB 2.8*  2.7* 2.8*   GLOB 3.4  4.2* 3.9     No results for input(s): INR, PTP, APTT, INREXT, INREXT in the last 72 hours. Recent Labs     04/25/21 0357 04/23/21  0551   FERR 1,470* 2,008*      No results found for: FOL, RBCF   No results for input(s): PH, PCO2, PO2 in the last 72 hours. No results for input(s): CPK, CKNDX, TROIQ in the last 72 hours.     No lab exists for component: CPKMB  No results found for: CHOL, CHOLX, CHLST, CHOLV, HDL, HDLP, LDL, LDLC, DLDLP, TGLX, TRIGL, TRIGP, CHHD, CHHDX  Lab Results   Component Value Date/Time    Glucose (POC) 159 (H) 04/25/2021 12:16 PM    Glucose (POC) 134 (H) 04/25/2021 07:08 AM    Glucose (POC) 236 (H) 04/24/2021 08:59 PM    Glucose (POC) 147 (H) 04/24/2021 04:16 PM    Glucose (POC) 151 (H) 04/24/2021 11:28 AM     Lab Results   Component Value Date/Time    Color YELLOW/STRAW 04/21/2021 04:16 AM    Appearance CLEAR 04/21/2021 04:16 AM    Specific gravity 1.030 04/21/2021 04:16 AM    pH (UA) 5.0 04/21/2021 04:16 AM    Protein TRACE (A) 04/21/2021 04:16 AM    Glucose Negative 04/21/2021 04:16 AM    Ketone Negative 04/21/2021 04:16 AM    Bilirubin Negative 04/21/2021 04:16 AM    Urobilinogen 0.2 04/21/2021 04:16 AM    Nitrites Negative 04/21/2021 04:16 AM    Leukocyte Esterase Negative 04/21/2021 04:16 AM    Epithelial cells FEW 04/21/2021 04:16 AM    Bacteria Negative 04/21/2021 04:16 AM    WBC 0-4 04/21/2021 04:16 AM    RBC 0-5 04/21/2021 04:16 AM         Medications Reviewed:     Current Facility-Administered Medications   Medication Dose Route Frequency    methylPREDNISolone (PF) (Solu-MEDROL) injection 60 mg  60 mg IntraVENous Q6H    remdesivir 100 mg in 0.9% sodium chloride 250 mL IVPB  100 mg IntraVENous Q24H    furosemide (LASIX) injection 40 mg  40 mg IntraVENous DAILY    losartan/hydroCHLOROthiazide (HYZAAR) 100/25 mg   Oral DAILY    albuterol (PROVENTIL HFA, VENTOLIN HFA, PROAIR HFA) inhaler 2 Puff  2 Puff Inhalation Q4H RT    insulin lispro (HUMALOG) injection   SubCUTAneous AC&HS    glucose chewable tablet 16 g  4 Tab Oral PRN    dextrose (D50W) injection syrg 12.5-25 g  12.5-25 g IntraVENous PRN    glucagon (GLUCAGEN) injection 1 mg  1 mg IntraMUSCular PRN    citalopram (CELEXA) tablet 40 mg  40 mg Oral DAILY    enoxaparin (LOVENOX) injection 40 mg  40 mg SubCUTAneous Q12H    acetaminophen (TYLENOL) tablet 650 mg  650 mg Oral Q6H PRN    Or    acetaminophen (TYLENOL) suppository 650 mg  650 mg Rectal Q6H PRN    guaiFENesin-dextromethorphan (ROBITUSSIN DM) 100-10 mg/5 mL syrup 5 mL  5 mL Oral Q4H PRN    cholecalciferol (VITAMIN D3) (1000 Units /25 mcg) tablet 2,000 Units  2,000 Units Oral DAILY    ascorbic acid (vitamin C) (VITAMIN C) tablet 500 mg  500 mg Oral BID    zinc sulfate (ZINCATE) 50 mg zinc (220 mg) capsule 1 Cap  1 Cap Oral DAILY    sodium chloride (NS) flush 5-40 mL  5-40 mL IntraVENous Q8H    sodium chloride (NS) flush 5-40 mL  5-40 mL IntraVENous PRN     ______________________________________________________________________  EXPECTED LENGTH OF STAY: 5d 9h  ACTUAL LENGTH OF STAY:          5                 Mala Oleary MD

## 2021-04-26 ENCOUNTER — APPOINTMENT (OUTPATIENT)
Dept: NON INVASIVE DIAGNOSTICS | Age: 51
DRG: 177 | End: 2021-04-26
Attending: NURSE PRACTITIONER
Payer: COMMERCIAL

## 2021-04-26 LAB
BACTERIA SPEC CULT: NORMAL
CRP SERPL-MCNC: 0.83 MG/DL (ref 0–0.6)
D DIMER PPP FEU-MCNC: 0.61 MG/L FEU (ref 0–0.65)
ECHO AO ROOT DIAM: 3.45 CM
ECHO AV AREA PEAK VELOCITY: 2.58 CM2
ECHO AV AREA/BSA PEAK VELOCITY: 1 CM2/M2
ECHO AV PEAK GRADIENT: 9.88 MMHG
ECHO AV PEAK VELOCITY: 157.14 CM/S
ECHO LA MAJOR AXIS: 4.8 CM
ECHO LA MINOR AXIS: 1.8 CM
ECHO LV E' LATERAL VELOCITY: 8.98 CM/S
ECHO LV E' SEPTAL VELOCITY: 8.01 CM/S
ECHO LV INTERNAL DIMENSION DIASTOLIC: 5.31 CM (ref 4.2–5.9)
ECHO LV INTERNAL DIMENSION SYSTOLIC: 3.2 CM
ECHO LV IVSD: 1.17 CM (ref 0.6–1)
ECHO LV MASS 2D: 254.4 G (ref 88–224)
ECHO LV MASS INDEX 2D: 95.5 G/M2 (ref 49–115)
ECHO LV POSTERIOR WALL DIASTOLIC: 1.21 CM (ref 0.6–1)
ECHO LVOT DIAM: 2.03 CM
ECHO LVOT PEAK GRADIENT: 6.34 MMHG
ECHO LVOT PEAK VELOCITY: 125.9 CM/S
ECHO MV A VELOCITY: 56 CM/S
ECHO MV AREA PHT: 2.39 CM2
ECHO MV E DECELERATION TIME (DT): 317.29 MS
ECHO MV E VELOCITY: 87.54 CM/S
ECHO MV E/A RATIO: 1.56
ECHO MV E/E' LATERAL: 9.75
ECHO MV E/E' RATIO (AVERAGED): 10.34
ECHO MV E/E' SEPTAL: 10.93
ECHO MV PRESSURE HALF TIME (PHT): 92.02 MS
ECHO PV PEAK INSTANTANEOUS GRADIENT SYSTOLIC: 3.82 MMHG
ECHO PV REGURGITANT MAX VELOCITY: 97.69 CM/S
ECHO RV TAPSE: 2.81 CM (ref 1.5–2)
GLUCOSE BLD STRIP.AUTO-MCNC: 146 MG/DL (ref 65–100)
GLUCOSE BLD STRIP.AUTO-MCNC: 180 MG/DL (ref 65–100)
GLUCOSE BLD STRIP.AUTO-MCNC: 201 MG/DL (ref 65–100)
SERVICE CMNT-IMP: ABNORMAL
SPECIAL REQUESTS,SREQ: NORMAL

## 2021-04-26 PROCEDURE — 74011250636 HC RX REV CODE- 250/636: Performed by: INTERNAL MEDICINE

## 2021-04-26 PROCEDURE — 65660000001 HC RM ICU INTERMED STEPDOWN

## 2021-04-26 PROCEDURE — C8929 TTE W OR WO FOL WCON,DOPPLER: HCPCS

## 2021-04-26 PROCEDURE — 74011000250 HC RX REV CODE- 250: Performed by: INTERNAL MEDICINE

## 2021-04-26 PROCEDURE — 85379 FIBRIN DEGRADATION QUANT: CPT

## 2021-04-26 PROCEDURE — 82962 GLUCOSE BLOOD TEST: CPT

## 2021-04-26 PROCEDURE — 97530 THERAPEUTIC ACTIVITIES: CPT

## 2021-04-26 PROCEDURE — 77010033711 HC HIGH FLOW OXYGEN

## 2021-04-26 PROCEDURE — 74011636637 HC RX REV CODE- 636/637: Performed by: HOSPITALIST

## 2021-04-26 PROCEDURE — 74011250637 HC RX REV CODE- 250/637: Performed by: HOSPITALIST

## 2021-04-26 PROCEDURE — 74011250636 HC RX REV CODE- 250/636: Performed by: PHYSICIAN ASSISTANT

## 2021-04-26 PROCEDURE — 74011250636 HC RX REV CODE- 250/636: Performed by: NURSE PRACTITIONER

## 2021-04-26 PROCEDURE — 36415 COLL VENOUS BLD VENIPUNCTURE: CPT

## 2021-04-26 PROCEDURE — 94640 AIRWAY INHALATION TREATMENT: CPT

## 2021-04-26 PROCEDURE — 74011250637 HC RX REV CODE- 250/637: Performed by: INTERNAL MEDICINE

## 2021-04-26 PROCEDURE — 74011250637 HC RX REV CODE- 250/637: Performed by: NURSE PRACTITIONER

## 2021-04-26 PROCEDURE — 74011000258 HC RX REV CODE- 258: Performed by: INTERNAL MEDICINE

## 2021-04-26 PROCEDURE — 86140 C-REACTIVE PROTEIN: CPT

## 2021-04-26 RX ADMIN — ATORVASTATIN CALCIUM 20 MG: 20 TABLET, FILM COATED ORAL at 09:00

## 2021-04-26 RX ADMIN — OXYCODONE HYDROCHLORIDE AND ACETAMINOPHEN 500 MG: 500 TABLET ORAL at 09:00

## 2021-04-26 RX ADMIN — METHYLPREDNISOLONE SODIUM SUCCINATE 60 MG: 125 INJECTION, POWDER, FOR SOLUTION INTRAMUSCULAR; INTRAVENOUS at 05:00

## 2021-04-26 RX ADMIN — Medication 10 ML: at 06:00

## 2021-04-26 RX ADMIN — HYDROCHLOROTHIAZIDE: 25 TABLET ORAL at 09:00

## 2021-04-26 RX ADMIN — Medication 1 CAPSULE: at 09:00

## 2021-04-26 RX ADMIN — FUROSEMIDE 40 MG: 10 INJECTION, SOLUTION INTRAMUSCULAR; INTRAVENOUS at 09:00

## 2021-04-26 RX ADMIN — INSULIN LISPRO 2 UNITS: 100 INJECTION, SOLUTION INTRAVENOUS; SUBCUTANEOUS at 22:00

## 2021-04-26 RX ADMIN — REMDESIVIR 100 MG: 100 INJECTION, POWDER, LYOPHILIZED, FOR SOLUTION INTRAVENOUS at 19:33

## 2021-04-26 RX ADMIN — Medication 2000 UNITS: at 09:00

## 2021-04-26 RX ADMIN — METHYLPREDNISOLONE SODIUM SUCCINATE 40 MG: 40 INJECTION, POWDER, FOR SOLUTION INTRAMUSCULAR; INTRAVENOUS at 14:00

## 2021-04-26 RX ADMIN — ALBUTEROL SULFATE 2 PUFF: 90 AEROSOL, METERED RESPIRATORY (INHALATION) at 12:00

## 2021-04-26 RX ADMIN — METHYLPREDNISOLONE SODIUM SUCCINATE 40 MG: 40 INJECTION, POWDER, FOR SOLUTION INTRAMUSCULAR; INTRAVENOUS at 22:00

## 2021-04-26 RX ADMIN — OXYCODONE HYDROCHLORIDE AND ACETAMINOPHEN 500 MG: 500 TABLET ORAL at 19:33

## 2021-04-26 RX ADMIN — PERFLUTREN 1.5 ML: 6.52 INJECTION, SUSPENSION INTRAVENOUS at 08:35

## 2021-04-26 RX ADMIN — ENOXAPARIN SODIUM 40 MG: 100 INJECTION SUBCUTANEOUS at 04:00

## 2021-04-26 RX ADMIN — ALBUTEROL SULFATE 2 PUFF: 90 AEROSOL, METERED RESPIRATORY (INHALATION) at 20:00

## 2021-04-26 RX ADMIN — CITALOPRAM HYDROBROMIDE 40 MG: 20 TABLET ORAL at 09:00

## 2021-04-26 RX ADMIN — INSULIN LISPRO 2 UNITS: 100 INJECTION, SOLUTION INTRAVENOUS; SUBCUTANEOUS at 06:00

## 2021-04-26 RX ADMIN — ALBUTEROL SULFATE 2 PUFF: 90 AEROSOL, METERED RESPIRATORY (INHALATION) at 00:00

## 2021-04-26 RX ADMIN — ENOXAPARIN SODIUM 40 MG: 100 INJECTION SUBCUTANEOUS at 16:00

## 2021-04-26 RX ADMIN — ALBUTEROL SULFATE 2 PUFF: 90 AEROSOL, METERED RESPIRATORY (INHALATION) at 08:00

## 2021-04-26 RX ADMIN — INSULIN LISPRO 3 UNITS: 100 INJECTION, SOLUTION INTRAVENOUS; SUBCUTANEOUS at 11:30

## 2021-04-26 RX ADMIN — ALBUTEROL SULFATE 2 PUFF: 90 AEROSOL, METERED RESPIRATORY (INHALATION) at 04:00

## 2021-04-26 NOTE — PROGRESS NOTES
Name: [facility]: [address]   : 1970 Admit Date: 2021   Phone: [phone]  Room: [facility]   PCP: None  MRN: [id number]   Date: 2021  Code: Full Code        HPI:      Walked in to room. Patient was 84% sats on 40lpm and 64% Fio2. I increased to 45lpm and 92% FiO2 and saturations went above 90%   Was about to work with PT  D dimer 0.61  CRP 0.83      Going back and forth with NIV and high flow 60lpm and 90% Fio2  D dimer 0.55  Ferritin   CRP 4.53  probnp 44      CRP 9.33  Now on NIV/high flow  Could not receive remdesivir due to hospital policy   Notable desaturations last night and this am  Feels ok right now  Dicussed with RN       CRP 17.8  Still on 6lpm  Feels about the same   Day 7 of dx       5:44 PM       History was obtained from patient. I was asked by Jay Stearns MD to see Cammie Iverson in consultation for a chief complaint of shortness of breath. History of Present Illness: 48year old male with past medical history of asthma, allergic rhinitis and morbid obesity who presented to [facility] with increased shortness of breath. Diagnosed with COVID-19 1 week back at [facility]. Last fever 7 days back. Does have cough, shortness of breath and wheezing. On prednisone 20 mg q daily. Data:  Hgb 6.8  Wbc 14  Plt 202  D-dimer 0.79  Cr 1.41  Trop < 0.05  nt pro bnp 25  CRP pending. CXR - bilateral infiltrates. Past Medical History:   Diagnosis Date    Asthma     High cholesterol     Hypertension     Sleep apnea        Past Surgical History:   Procedure Laterality Date    HX HERNIA REPAIR      HX OPEN REDUCTION INTERNAL FIXATION      rt leg       No family history on file.     Social History     Tobacco Use    Smoking status: Never Smoker    Smokeless tobacco: Current User   Substance Use Topics    Alcohol use: Never     Frequency: Never       Allergies   Allergen Reactions    Penicillins Unknown (comments)       Current Facility-Administered Medications   Medication Dose Route Frequency    methylPREDNISolone (PF) (Solu-MEDROL) injection 40 mg  40 mg IntraVENous Q8H    atorvastatin (LIPITOR) tablet 20 mg  20 mg Oral DAILY    remdesivir 100 mg in 0.9% sodium chloride 250 mL IVPB  100 mg IntraVENous Q24H    furosemide (LASIX) injection 40 mg  40 mg IntraVENous DAILY    losartan/hydroCHLOROthiazide (HYZAAR) 100/25 mg   Oral DAILY    albuterol (PROVENTIL HFA, VENTOLIN HFA, PROAIR HFA) inhaler 2 Puff  2 Puff Inhalation Q4H RT    insulin lispro (HUMALOG) injection   SubCUTAneous AC&HS    glucose chewable tablet 16 g  4 Tab Oral PRN    dextrose (D50W) injection syrg 12.5-25 g  12.5-25 g IntraVENous PRN    glucagon (GLUCAGEN) injection 1 mg  1 mg IntraMUSCular PRN    citalopram (CELEXA) tablet 40 mg  40 mg Oral DAILY    enoxaparin (LOVENOX) injection 40 mg  40 mg SubCUTAneous Q12H    acetaminophen (TYLENOL) tablet 650 mg  650 mg Oral Q6H PRN    Or    acetaminophen (TYLENOL) suppository 650 mg  650 mg Rectal Q6H PRN    guaiFENesin-dextromethorphan (ROBITUSSIN DM) 100-10 mg/5 mL syrup 5 mL  5 mL Oral Q4H PRN    cholecalciferol (VITAMIN D3) (1000 Units /25 mcg) tablet 2,000 Units  2,000 Units Oral DAILY    ascorbic acid (vitamin C) (VITAMIN C) tablet 500 mg  500 mg Oral BID    zinc sulfate (ZINCATE) 50 mg zinc (220 mg) capsule 1 Cap  1 Cap Oral DAILY    sodium chloride (NS) flush 5-40 mL  5-40 mL IntraVENous Q8H    sodium chloride (NS) flush 5-40 mL  5-40 mL IntraVENous PRN         REVIEW OF SYSTEMS   Negative except as stated in the HPI. Physical Exam:   Visit Vitals  BP (!) 157/74 (BP 1 Location: Right arm, BP Patient Position: At rest)   Pulse 77   Temp 97.8 °F (36.6 °C)   Resp 20   Ht 5' 10\" (1.778 m)   Wt (!) 161 kg (355 lb)   SpO2 91%   BMI 50.94 kg/m²     Patient has COVID-19 infection and examination was deferred. Only inspection was performed. General:  Alert, cooperative, no distress.    Head: Normocephalic. Eyes:  Conjunctivae/corneas clear. Nose: Nares normal. Septum midline. Extremities: Extremities normal, atraumatic, no cyanosis or edema. Skin: Skin color, texture, turgor normal. No rashes or lesions       Neurologic: Grossly nonfocal       Lab Results   Component Value Date/Time    Sodium 140 04/25/2021 03:57 AM    Sodium 137 04/25/2021 03:57 AM    Potassium 3.7 04/25/2021 03:57 AM    Potassium 3.6 04/25/2021 03:57 AM    Chloride 104 04/25/2021 03:57 AM    Chloride 105 04/25/2021 03:57 AM    CO2 28 04/25/2021 03:57 AM    CO2 28 04/25/2021 03:57 AM    BUN 49 (H) 04/25/2021 03:57 AM    BUN 50 (H) 04/25/2021 03:57 AM    Creatinine 1.18 04/25/2021 03:57 AM    Creatinine 1.18 04/25/2021 03:57 AM    Glucose 165 (H) 04/25/2021 03:57 AM    Glucose 158 (H) 04/25/2021 03:57 AM    Calcium 8.8 04/25/2021 03:57 AM    Calcium 8.7 04/25/2021 03:57 AM    Magnesium 2.9 (H) 04/20/2021 07:12 PM    Lactic acid 0.9 04/20/2021 07:12 PM       Lab Results   Component Value Date/Time    WBC 8.4 04/23/2021 05:51 AM    HGB 14.4 04/23/2021 05:51 AM    PLATELET 615 28/15/2149 05:51 AM    MCV 89.0 04/23/2021 05:51 AM       Lab Results   Component Value Date/Time    INR 1.0 04/20/2021 07:12 PM    aPTT 31.7 (H) 04/20/2021 07:12 PM    Alk. phosphatase 97 04/25/2021 03:57 AM    Alk. phosphatase 99 04/25/2021 03:57 AM    Protein, total 6.2 (L) 04/25/2021 03:57 AM    Protein, total 6.9 04/25/2021 03:57 AM    Albumin 2.8 (L) 04/25/2021 03:57 AM    Albumin 2.7 (L) 04/25/2021 03:57 AM    Globulin 3.4 04/25/2021 03:57 AM    Globulin 4.2 (H) 04/25/2021 03:57 AM       No results found for: PH, PHI, PCO2, PCO2I, PO2, PO2I, HCO3, HCO3I, FIO2, FIO2I    Lab Results   Component Value Date/Time    Troponin-I, Qt. <0.05 04/20/2021 07:12 PM        IMPRESSION:  ===========  -COVID-19.  -COVID-19 pneumonia. O + blood type. Vit D deficient   -hypoxemic respiratory failure.  Worsening, suspected ARDS  -asthma.  -seasonal allergies.  -Obesity. PLAN:  =====  -CRP / d-dimer daily.  -solu medrol continue at current dose   -lovenox bid dosing   -s/p actemra   -diuresis, continue .  Check probnp   -O2 supplementation above 90%, high flow/NIV   -sleep prone > 8 hours.  -Patient is critically ill and at high risk for further decline, mechanical ventilation, and ICU admission due to [address] ARDS; CC time spent excluding procedures is > 35 minutes       Keven Veras PA-C

## 2021-04-26 NOTE — PROGRESS NOTES
Problem: Mobility Impaired (Adult and Pediatric)  Goal: *Acute Goals and Plan of Care (Insert Text)  Description: FUNCTIONAL STATUS PRIOR TO ADMISSION: Patient was independent and active without use of DME.    HOME SUPPORT PRIOR TO ADMISSION: The patient lived with family but did not require assist.    Physical Therapy Goals  Initiated 4/22/2021  1. Patient will move from supine to sit and sit to supine , scoot up and down, and roll side to side in bed with modified independence within 7 day(s). 2.  Patient will transfer from bed to chair and chair to bed with modified independence using the least restrictive device within 7 day(s). 3.  Patient will perform sit to stand with minimal assistance/contact guard assist within 7 day(s). 4.  Patient will ambulate with minimal assistance/contact guard assist for 10 feet with the least restrictive device within 7 day(s). 5.  Patient will ascend/descend 2 stairs with one handrail(s) with minimal assistance/contact guard assist within 7 day(s). Outcome: Progressing Towards Goal     PHYSICAL THERAPY TREATMENT  Patient: Jacques Marlow (54 y.o. male)  Date: 4/26/2021  Diagnosis: [facility] ([facility] [J96.90]  COVID-19 [U07.1] <principal problem not specified>       Precautions: (Droplet Plus)  Chart, physical therapy assessment, plan of care and goals were reviewed. ASSESSMENT  Patient continues with skilled PT services and is progressing towards goals. Pt received with MD, on 85-88% O2 on 30L, MD titrates pt to 45L, recovers into the 90s. Pt tolerates supine to sit well with HiFlow at 45L. Pt tolerates standing from low surface however pulling to stand with RW and increased imbalanced. Pt demos good balance on his feet but easily begins to have stumbling stride, distracted and having difficulty maintaining lines. Pt tolerates gait training to chair and sits with cues for hand placement. Pt requires increased time to recover from mobiltiy.  Pt noted to be losing his RA and V leads, assisted pt with shaving patches for leads to attach to skin. Will continue to follow and progress as able once off the hiflow. Current Level of Function Impacting Discharge (mobility/balance): min a    Other factors to consider for discharge:          PLAN :  Patient continues to benefit from skilled intervention to address the above impairments. Continue treatment per established plan of care. to address goals. Recommendation for discharge: (in order for the patient to meet his/her long term goals)  Physical therapy at least 2 days/week in the home if pt able to wean off hiflow O2 and progress gait    This discharge recommendation:  Has been made in collaboration with the attending provider and/or case management    IF patient discharges home will need the following DME: rolling walker       SUBJECTIVE:   Patient stated Lisa Edwards feel okay I guess.     OBJECTIVE DATA SUMMARY:   Critical Behavior:  Neurologic State: Alert  Orientation Level: Oriented X4  Cognition: Follows commands  Safety/Judgement: Awareness of environment, Fall prevention  Functional Mobility Training:  Bed Mobility:     Supine to Sit: Modified independent     Scooting: Modified independent        Transfers:  Sit to Stand: Stand-by assistance  Stand to Sit: Stand-by assistance                             Balance:  Sitting: Intact  Standing: Intact; With support  Ambulation/Gait Training:  Distance (ft): 5 Feet (ft)  Assistive Device: Walker, rolling;Gait belt; Other (comment)(O2 at 45L)  Ambulation - Level of Assistance: Supervision        Gait Abnormalities: Decreased step clearance;Shuffling gait(difficulty with line management)        Base of Support: Widened     Speed/Na: Slow;Shuffled  Step Length: Right shortened;Left shortened   Pain Rating:  none    Activity Tolerance:   Good, desaturates with exertion and requires oxygen and requires frequent rest breaks    After treatment patient left in no apparent distress:   Sitting in chair and Call bell within reach    COMMUNICATION/COLLABORATION:   The patients plan of care was discussed with: Registered nurse and Case management.      Terry Gonzalez, PT   Time Calculation: 28 mins

## 2021-04-26 NOTE — PROGRESS NOTES
Transitions of Care: 12% risk of re-admission      -TBD, home with home health vs. IPR pending progress, PT/OT following COVID-19    The CM reviewed PT/OT recommendations- currently home health vs. rehabilitation IPR- will monitor. The patient remains on High Flow Oxygen 35-40L. The patient does not wear Oxygen at baseline. The CM will follow for transitions of care needs- monitor progress with therapy as patient medically stabilizes.      Yusef Veliz, MSW

## 2021-04-26 NOTE — PROGRESS NOTES
[facility]  Hospitalist Group                                                                                          Hospitalist Progress Note  Ramakrishna Booth MD  Answering service: [phone] OR [phone] from in house phone        Date of Service:  2021  NAME:  Elmer Blanca  :  1970  MRN:  [id number]      Admission Summary:   48 y.o. male with past medical history of asthma, hypertension, hyperlipidemia and morbid obesity presented to [facility] ED this morning with increased shortness of breath. Interval history / Subjective:   Patient seen and examined this morning. Patient feeling better all over. Having some chest tightness but no pain. He was eating in a lying position with some intermittent coughing. Weaned down to 40L Fio2 64% and he was sating early 90%. At the time of this note being written, his high flow is adjusted to 45L and Fio2 92% by pulmonary as he was noted to desaturate on the above setting.       Assessment & Plan:     # Acute hypoxemic respiratory failure  # COVID 19 pneumonia  - on high flow   - switched decadron to Solumedrol taper dose   - s/p actemra X 1  - continue on Remdesivir   - on vit c and Zinc   - diuresis on IV lasix   - follow daily inflammatory markers/ d-dimer   - pulmonologist following  - low procal,does not need abx  - wean down oxygen as tolerated   - Appt ID input       # HTN:-BP trending up, on Losartan/Hctz combination   # Morbid obesity: BMI 53  # Depression: celexa  # VSH:JPUX      Code status: Full  DVT prophylaxis: lovenox    Care Plan discussed with: patient seen and examined  Anticipated Disposition:tbd  Discharge: Home when medically ready      Hospital Problems  Never Reviewed          Codes Class Noted POA    Respiratory failure ([facility] [address] ICD-10-CM: J96.90  ICD-9-CM: 518.81  2021 Unknown        COVID-19 ICD-10-CM: U07.1  ICD-9-CM: 079.89  2021 Unknown                Review of Systems:   Pertinent positive mentioned in interval hx/HPI. Other systems reviewed and negative     Vital Signs:    Last 24hrs VS reviewed since prior progress note. Most recent are:  Visit Vitals  BP (!) 140/68 (BP 1 Location: Right arm, BP Patient Position: Sitting)   Pulse 82   Temp 98 °F (36.7 °C)   Resp 22   Ht 5' 10\" (1.778 m)   Wt (!) 161 kg (355 lb)   SpO2 93%   BMI 50.94 kg/m²         Intake/Output Summary (Last 24 hours) at 4/26/2021 1235  Last data filed at 4/26/2021 0413  Gross per 24 hour   Intake    Output 600 ml   Net -600 ml        Physical Examination:     I had a face to face encounter with this patient and independently examined them on 4/26/2021 as outlined below:          Constitutional:  No acute distress, obese    ENT:  Oral mucosa moist, oropharynx benign. Resp:  good air entry bilateral , no wheezing,no accessory muscle use   CV:  Regular rhythm, normal rate, S1,S2 wnl    GI:  Soft, non distended, obese    Musculoskeletal:  No LE edema    Neurologic:  Moves all extremities. AAOx3     Psych:  not anxious nor agitated. Skin: no rashes or ulcers       Data Review:     I personally reviewed labs and imaging     Labs:     No results for input(s): WBC, HGB, HCT, PLT, HGBEXT, HCTEXT, PLTEXT, HGBEXT, HCTEXT, PLTEXT in the last 72 hours. Recent Labs     04/25/21 0357     137   K 3.7  3.6     105   CO2 28  28   BUN 49*  50*   CREA 1.18  1.18   *  158*   CA 8.8  8.7     Recent Labs     04/25/21 0357   ALT 73  74   AP 97  99   TBILI 0.5  0.4   TP 6.2*  6.9   ALB 2.8*  2.7*   GLOB 3.4  4.2*     No results for input(s): INR, PTP, APTT, INREXT, INREXT in the last 72 hours. Recent Labs     04/25/21 0357   FERR 1,470*      No results found for: FOL, RBCF   No results for input(s): PH, PCO2, PO2 in the last 72 hours. No results for input(s): CPK, CKNDX, TROIQ in the last 72 hours.     No lab exists for component: CPKMB  No results found for: CHOL, CHOLX, CHLST, CHOLV, HDL, HDLP, LDL, LDLC, DLDLP, TGLX, TRIGL, TRIGP, [facility], [facility]  Lab Results   Component Value Date/Time    Glucose (POC) 180 (H) 04/26/2021 05:55 AM    Glucose (POC) 172 (H) 04/25/2021 09:53 PM    Glucose (POC) 163 (H) 04/25/2021 05:35 PM    Glucose (POC) 159 (H) 04/25/2021 12:16 PM    Glucose (POC) 134 (H) 04/25/2021 07:08 AM     Lab Results   Component Value Date/Time    Color YELLOW/STRAW 04/21/2021 04:16 AM    Appearance CLEAR 04/21/2021 04:16 AM    Specific gravity 1.030 04/21/2021 04:16 AM    pH (UA) 5.0 04/21/2021 04:16 AM    Protein TRACE (A) 04/21/2021 04:16 AM    Glucose Negative 04/21/2021 04:16 AM    Ketone Negative 04/21/2021 04:16 AM    Bilirubin Negative 04/21/2021 04:16 AM    Urobilinogen 0.2 04/21/2021 04:16 AM    Nitrites Negative 04/21/2021 04:16 AM    Leukocyte Esterase Negative 04/21/2021 04:16 AM    Epithelial cells FEW 04/21/2021 04:16 AM    Bacteria Negative 04/21/2021 04:16 AM    WBC 0-4 04/21/2021 04:16 AM    RBC 0-5 04/21/2021 04:16 AM         Medications Reviewed:     Current Facility-Administered Medications   Medication Dose Route Frequency    methylPREDNISolone (PF) (Solu-MEDROL) injection 40 mg  40 mg IntraVENous Q8H    atorvastatin (LIPITOR) tablet 20 mg  20 mg Oral DAILY    remdesivir 100 mg in 0.9% sodium chloride 250 mL IVPB  100 mg IntraVENous Q24H    furosemide (LASIX) injection 40 mg  40 mg IntraVENous DAILY    losartan/hydroCHLOROthiazide (HYZAAR) 100/25 mg   Oral DAILY    albuterol (PROVENTIL HFA, VENTOLIN HFA, PROAIR HFA) inhaler 2 Puff  2 Puff Inhalation Q4H RT    insulin lispro (HUMALOG) injection   SubCUTAneous AC&HS    glucose chewable tablet 16 g  4 Tab Oral PRN    dextrose (D50W) injection syrg 12.5-25 g  12.5-25 g IntraVENous PRN    glucagon (GLUCAGEN) injection 1 mg  1 mg IntraMUSCular PRN    citalopram (CELEXA) tablet 40 mg  40 mg Oral DAILY    enoxaparin (LOVENOX) injection 40 mg  40 mg SubCUTAneous Q12H    acetaminophen (TYLENOL) tablet 650 mg  650 mg Oral Q6H PRN    Or    acetaminophen (TYLENOL) suppository 650 mg  650 mg Rectal Q6H PRN    guaiFENesin-dextromethorphan (ROBITUSSIN DM) 100-10 mg/5 mL syrup 5 mL  5 mL Oral Q4H PRN    cholecalciferol (VITAMIN D3) (1000 Units /25 mcg) tablet 2,000 Units  2,000 Units Oral DAILY    ascorbic acid (vitamin C) (VITAMIN C) tablet 500 mg  500 mg Oral BID    zinc sulfate (ZINCATE) 50 mg zinc (220 mg) capsule 1 Cap  1 Cap Oral DAILY    sodium chloride (NS) flush 5-40 mL  5-40 mL IntraVENous Q8H    sodium chloride (NS) flush 5-40 mL  5-40 mL IntraVENous PRN     ______________________________________________________________________  EXPECTED LENGTH OF STAY: 5d 9h  ACTUAL LENGTH OF STAY:          6                 Mala Oleary MD

## 2021-04-26 NOTE — PROGRESS NOTES
Bedside shift change report given to Mago (oncoming nurse) by Deniz ROBIN (offgoing nurse). Report included the following information SBAR, Intake/Output, MAR, Med Rec Status and Cardiac Rhythm nsr.

## 2021-04-27 ENCOUNTER — APPOINTMENT (OUTPATIENT)
Dept: GENERAL RADIOLOGY | Age: 51
DRG: 177 | End: 2021-04-27
Attending: PHYSICIAN ASSISTANT
Payer: COMMERCIAL

## 2021-04-27 LAB
ANION GAP SERPL CALC-SCNC: 4 MMOL/L (ref 5–15)
BUN SERPL-MCNC: 49 MG/DL (ref 6–20)
BUN/CREAT SERPL: 44 (ref 12–20)
CALCIUM SERPL-MCNC: 9.1 MG/DL (ref 8.5–10.1)
CHLORIDE SERPL-SCNC: 103 MMOL/L (ref 97–108)
CO2 SERPL-SCNC: 28 MMOL/L (ref 21–32)
CREAT SERPL-MCNC: 1.12 MG/DL (ref 0.7–1.3)
GLUCOSE BLD STRIP.AUTO-MCNC: 117 MG/DL (ref 65–100)
GLUCOSE BLD STRIP.AUTO-MCNC: 131 MG/DL (ref 65–100)
GLUCOSE BLD STRIP.AUTO-MCNC: 132 MG/DL (ref 65–100)
GLUCOSE BLD STRIP.AUTO-MCNC: 151 MG/DL (ref 65–100)
GLUCOSE BLD STRIP.AUTO-MCNC: 155 MG/DL (ref 65–100)
GLUCOSE SERPL-MCNC: 136 MG/DL (ref 65–100)
POTASSIUM SERPL-SCNC: 4.6 MMOL/L (ref 3.5–5.1)
SERVICE CMNT-IMP: ABNORMAL
SODIUM SERPL-SCNC: 135 MMOL/L (ref 136–145)

## 2021-04-27 PROCEDURE — 65660000001 HC RM ICU INTERMED STEPDOWN

## 2021-04-27 PROCEDURE — 80048 BASIC METABOLIC PNL TOTAL CA: CPT

## 2021-04-27 PROCEDURE — 74011250636 HC RX REV CODE- 250/636: Performed by: INTERNAL MEDICINE

## 2021-04-27 PROCEDURE — 74011000250 HC RX REV CODE- 250: Performed by: INTERNAL MEDICINE

## 2021-04-27 PROCEDURE — 74011250636 HC RX REV CODE- 250/636: Performed by: NURSE PRACTITIONER

## 2021-04-27 PROCEDURE — 74011250637 HC RX REV CODE- 250/637: Performed by: INTERNAL MEDICINE

## 2021-04-27 PROCEDURE — 74011636637 HC RX REV CODE- 636/637: Performed by: HOSPITALIST

## 2021-04-27 PROCEDURE — 74011250637 HC RX REV CODE- 250/637: Performed by: HOSPITALIST

## 2021-04-27 PROCEDURE — 94640 AIRWAY INHALATION TREATMENT: CPT

## 2021-04-27 PROCEDURE — 36415 COLL VENOUS BLD VENIPUNCTURE: CPT

## 2021-04-27 PROCEDURE — 71045 X-RAY EXAM CHEST 1 VIEW: CPT

## 2021-04-27 PROCEDURE — 74011000258 HC RX REV CODE- 258: Performed by: INTERNAL MEDICINE

## 2021-04-27 PROCEDURE — 74011250637 HC RX REV CODE- 250/637: Performed by: NURSE PRACTITIONER

## 2021-04-27 PROCEDURE — 74011250636 HC RX REV CODE- 250/636: Performed by: PHYSICIAN ASSISTANT

## 2021-04-27 RX ADMIN — Medication 1 CAPSULE: at 09:00

## 2021-04-27 RX ADMIN — Medication 10 ML: at 06:00

## 2021-04-27 RX ADMIN — FUROSEMIDE 40 MG: 10 INJECTION, SOLUTION INTRAMUSCULAR; INTRAVENOUS at 09:00

## 2021-04-27 RX ADMIN — OXYCODONE HYDROCHLORIDE AND ACETAMINOPHEN 500 MG: 500 TABLET ORAL at 18:00

## 2021-04-27 RX ADMIN — ALBUTEROL SULFATE 2 PUFF: 90 AEROSOL, METERED RESPIRATORY (INHALATION) at 12:00

## 2021-04-27 RX ADMIN — Medication 2000 UNITS: at 09:00

## 2021-04-27 RX ADMIN — ENOXAPARIN SODIUM 40 MG: 100 INJECTION SUBCUTANEOUS at 16:00

## 2021-04-27 RX ADMIN — INSULIN LISPRO 2 UNITS: 100 INJECTION, SOLUTION INTRAVENOUS; SUBCUTANEOUS at 07:30

## 2021-04-27 RX ADMIN — METHYLPREDNISOLONE SODIUM SUCCINATE 40 MG: 40 INJECTION, POWDER, FOR SOLUTION INTRAMUSCULAR; INTRAVENOUS at 06:00

## 2021-04-27 RX ADMIN — ENOXAPARIN SODIUM 40 MG: 100 INJECTION SUBCUTANEOUS at 04:00

## 2021-04-27 RX ADMIN — ATORVASTATIN CALCIUM 20 MG: 20 TABLET, FILM COATED ORAL at 09:00

## 2021-04-27 RX ADMIN — ALBUTEROL SULFATE 2 PUFF: 90 AEROSOL, METERED RESPIRATORY (INHALATION) at 08:05

## 2021-04-27 RX ADMIN — HYDROCHLOROTHIAZIDE: 25 TABLET ORAL at 09:00

## 2021-04-27 RX ADMIN — OXYCODONE HYDROCHLORIDE AND ACETAMINOPHEN 500 MG: 500 TABLET ORAL at 09:00

## 2021-04-27 RX ADMIN — ALBUTEROL SULFATE 2 PUFF: 90 AEROSOL, METERED RESPIRATORY (INHALATION) at 20:00

## 2021-04-27 RX ADMIN — ALBUTEROL SULFATE 2 PUFF: 90 AEROSOL, METERED RESPIRATORY (INHALATION) at 03:10

## 2021-04-27 RX ADMIN — INSULIN LISPRO 2 UNITS: 100 INJECTION, SOLUTION INTRAVENOUS; SUBCUTANEOUS at 16:30

## 2021-04-27 RX ADMIN — Medication 10 ML: at 14:00

## 2021-04-27 RX ADMIN — REMDESIVIR 100 MG: 100 INJECTION, POWDER, LYOPHILIZED, FOR SOLUTION INTRAVENOUS at 19:00

## 2021-04-27 RX ADMIN — METHYLPREDNISOLONE SODIUM SUCCINATE 40 MG: 40 INJECTION, POWDER, FOR SOLUTION INTRAMUSCULAR; INTRAVENOUS at 21:00

## 2021-04-27 RX ADMIN — CITALOPRAM HYDROBROMIDE 40 MG: 20 TABLET ORAL at 09:00

## 2021-04-27 RX ADMIN — ALBUTEROL SULFATE 2 PUFF: 90 AEROSOL, METERED RESPIRATORY (INHALATION) at 00:00

## 2021-04-27 NOTE — PROGRESS NOTES
Name: [facility]: [address]   : 1970 Admit Date: 2021   Phone: [phone]  Room: [facility]   PCP: None  MRN: [id number]   Date: 2021  Code: Full Code        HPI:      Down to 30 lpm and 90% Fio2  Feeling better  D dimer 0.61      Walked in to room. Patient was 84% sats on 40lpm and 64% Fio2. I increased to 45lpm and 92% FiO2 and saturations went above 90%   Was about to work with PT  D dimer 0.61  CRP 0.83      Going back and forth with NIV and high flow 60lpm and 90% Fio2  D dimer 0.55  Ferritin   CRP 4.53  probnp 44      CRP 9.33  Now on NIV/high flow  Could not receive remdesivir due to hospital policy   Notable desaturations last night and this am  Feels ok right now  Dicussed with RN       CRP 17.8  Still on 6lpm  Feels about the same   Day 7 of dx       5:44 PM       History was obtained from patient. I was asked by Ezequiel Thomas MD to see Ysabel Rodriguez in consultation for a chief complaint of shortness of breath. History of Present Illness: 48year old male with past medical history of asthma, allergic rhinitis and morbid obesity who presented to [facility] with increased shortness of breath. Diagnosed with COVID-19 1 week back at [facility]. Last fever 7 days back. Does have cough, shortness of breath and wheezing. On prednisone 20 mg q daily. Data:  Hgb 6.8  Wbc 14  Plt 202  D-dimer 0.79  Cr 1.41  Trop < 0.05  nt pro bnp 25  CRP pending. CXR - bilateral infiltrates. Past Medical History:   Diagnosis Date    Asthma     High cholesterol     Hypertension     Sleep apnea        Past Surgical History:   Procedure Laterality Date    HX HERNIA REPAIR      HX OPEN REDUCTION INTERNAL FIXATION      rt leg       No family history on file.     Social History     Tobacco Use    Smoking status: Never Smoker    Smokeless tobacco: Current User   Substance Use Topics    Alcohol use: Never     Frequency: Never       Allergies Allergen Reactions    Penicillins Unknown (comments)       Current Facility-Administered Medications   Medication Dose Route Frequency    methylPREDNISolone (PF) (Solu-MEDROL) injection 40 mg  40 mg IntraVENous Q8H    atorvastatin (LIPITOR) tablet 20 mg  20 mg Oral DAILY    remdesivir 100 mg in 0.9% sodium chloride 250 mL IVPB  100 mg IntraVENous Q24H    furosemide (LASIX) injection 40 mg  40 mg IntraVENous DAILY    losartan/hydroCHLOROthiazide (HYZAAR) 100/25 mg   Oral DAILY    albuterol (PROVENTIL HFA, VENTOLIN HFA, PROAIR HFA) inhaler 2 Puff  2 Puff Inhalation Q4H RT    insulin lispro (HUMALOG) injection   SubCUTAneous AC&HS    glucose chewable tablet 16 g  4 Tab Oral PRN    dextrose (D50W) injection syrg 12.5-25 g  12.5-25 g IntraVENous PRN    glucagon (GLUCAGEN) injection 1 mg  1 mg IntraMUSCular PRN    citalopram (CELEXA) tablet 40 mg  40 mg Oral DAILY    enoxaparin (LOVENOX) injection 40 mg  40 mg SubCUTAneous Q12H    acetaminophen (TYLENOL) tablet 650 mg  650 mg Oral Q6H PRN    Or    acetaminophen (TYLENOL) suppository 650 mg  650 mg Rectal Q6H PRN    guaiFENesin-dextromethorphan (ROBITUSSIN DM) 100-10 mg/5 mL syrup 5 mL  5 mL Oral Q4H PRN    cholecalciferol (VITAMIN D3) (1000 Units /25 mcg) tablet 2,000 Units  2,000 Units Oral DAILY    ascorbic acid (vitamin C) (VITAMIN C) tablet 500 mg  500 mg Oral BID    zinc sulfate (ZINCATE) 50 mg zinc (220 mg) capsule 1 Cap  1 Cap Oral DAILY    sodium chloride (NS) flush 5-40 mL  5-40 mL IntraVENous Q8H    sodium chloride (NS) flush 5-40 mL  5-40 mL IntraVENous PRN         REVIEW OF SYSTEMS   Negative except as stated in the HPI. Physical Exam:   Visit Vitals  BP (!) 151/86 (BP Patient Position: At rest)   Pulse 76   Temp 97.9 °F (36.6 °C)   Resp 20   Ht 5' 10\" (1.778 m)   Wt (!) 160.6 kg (354 lb)   SpO2 94%   BMI 50.79 kg/m²     Patient has COVID-19 infection and examination was deferred. Only inspection was performed.     General:  Alert, cooperative, no distress. Head:  Normocephalic. Eyes:  Conjunctivae/corneas clear. Nose: Nares normal. Septum midline. Extremities: Extremities normal, atraumatic, no cyanosis or edema. Skin: Skin color, texture, turgor normal. No rashes or lesions       Neurologic: Grossly nonfocal       Lab Results   Component Value Date/Time    Sodium 140 04/25/2021 03:57 AM    Sodium 137 04/25/2021 03:57 AM    Potassium 3.7 04/25/2021 03:57 AM    Potassium 3.6 04/25/2021 03:57 AM    Chloride 104 04/25/2021 03:57 AM    Chloride 105 04/25/2021 03:57 AM    CO2 28 04/25/2021 03:57 AM    CO2 28 04/25/2021 03:57 AM    BUN 49 (H) 04/25/2021 03:57 AM    BUN 50 (H) 04/25/2021 03:57 AM    Creatinine 1.18 04/25/2021 03:57 AM    Creatinine 1.18 04/25/2021 03:57 AM    Glucose 165 (H) 04/25/2021 03:57 AM    Glucose 158 (H) 04/25/2021 03:57 AM    Calcium 8.8 04/25/2021 03:57 AM    Calcium 8.7 04/25/2021 03:57 AM    Magnesium 2.9 (H) 04/20/2021 07:12 PM    Lactic acid 0.9 04/20/2021 07:12 PM       Lab Results   Component Value Date/Time    WBC 8.4 04/23/2021 05:51 AM    HGB 14.4 04/23/2021 05:51 AM    PLATELET 946 19/79/1590 05:51 AM    MCV 89.0 04/23/2021 05:51 AM       Lab Results   Component Value Date/Time    INR 1.0 04/20/2021 07:12 PM    aPTT 31.7 (H) 04/20/2021 07:12 PM    Alk. phosphatase 97 04/25/2021 03:57 AM    Alk. phosphatase 99 04/25/2021 03:57 AM    Protein, total 6.2 (L) 04/25/2021 03:57 AM    Protein, total 6.9 04/25/2021 03:57 AM    Albumin 2.8 (L) 04/25/2021 03:57 AM    Albumin 2.7 (L) 04/25/2021 03:57 AM    Globulin 3.4 04/25/2021 03:57 AM    Globulin 4.2 (H) 04/25/2021 03:57 AM       No results found for: PH, PHI, PCO2, PCO2I, PO2, PO2I, HCO3, HCO3I, FIO2, FIO2I    Lab Results   Component Value Date/Time    Troponin-I, Qt. <0.05 04/20/2021 07:12 PM        IMPRESSION:  ===========  -COVID-19.  -COVID-19 pneumonia. O + blood type. Vit D deficient   -hypoxemic respiratory failure. Worsening, suspected ARDS  -asthma.  -seasonal allergies.  -Obesity. PLAN:  =====  -CRP / d-dimer daily.  Check BMP, he may require adjustments to diuretics   -solu medrol ; reduce    -lovenox bid dosing   -s/p actemra   -diuresis, continue   -O2 supplementation above 90%, high flow/NIV   -sleep prone > 8 hours.  -Patient is critically ill and at high risk for further decline, mechanical ventilation, and ICU admission due to [address] ARDS; CC time spent excluding procedures is > 35 minutes       Keven Thompson PA-C

## 2021-04-27 NOTE — PROGRESS NOTES
Bedside and Verbal shift change report given to Keny (oncoming nurse) by Mary Rendon (offgoing nurse). Report included the following information SBAR, Kardex, Intake/Output, MAR, Recent Results and Cardiac Rhythm NSR.

## 2021-04-27 NOTE — PROGRESS NOTES
Patients daughter called for an update on her father. Advised her that he is doing well and they are slowly weaning his oxygen level down each day. I let her know that he chose not to work with PT this afternoon but is willing to sit up in the chair for dinner.

## 2021-04-27 NOTE — PROGRESS NOTES
[facility]                                                                                          Hospitalist Progress Note  Regan Keys MD  Answering service: [phone] OR [phone] from in house phone        Date of Service:  2021  NAME:  Eli Nicole  :  1970  MRN:  [id number]      Admission Summary:   48 y.o. male with past medical history of asthma, hypertension, hyperlipidemia and morbid obesity presented to [facility] ED this morning with increased shortness of breath. Interval history / Subjective:   Patient seen and examined this morning. Patient feeling better. Now down to 30L Fio2: 90% maintaining his oxygenation around early 90's. Feeling comfortable, able to do incentive spirometry. Assessment & Plan:     # Acute hypoxemic respiratory failure  # COVID 19 pneumonia  - still on high flow  - switched decadron to Solumedrol taper dose   - s/p actemra X 1  - continue on Remdesivir   - on vit c and Zinc   - diuresis on IV lasix   - follow daily inflammatory markers/ d-dimer   - pulmonologist following  - low procal,does not need abx  - wean down oxygen as tolerated   - Appt ID input       # HTN:-BP trending up, on Losartan/Hctz combination   # Morbid obesity: BMI 53  # Depression: celexa  # USC:OGXA      Code status: Full  DVT prophylaxis: lovenox    Care Plan discussed with: patient seen and examined  Anticipated Disposition:tbd  Discharge: Home when medically ready      Hospital Problems  Never Reviewed          Codes Class Noted POA    Respiratory failure ([address] ICD-10-CM: J96.90  ICD-9-CM: 518.81  2021 Unknown        COVID-19 ICD-10-CM: U07.1  ICD-9-CM: 079.89  2021 Unknown                Review of Systems:   Pertinent positive mentioned in interval hx/HPI. Other systems reviewed and negative     Vital Signs:    Last 24hrs VS reviewed since prior progress note.  Most recent are:  Visit Vitals  /73 (BP Patient Position: At rest)   Pulse 67   Temp 97.7 °F (36.5 °C)   Resp 18   Ht 5' 10\" (1.778 m)   Wt (!) 160.6 kg (354 lb)   SpO2 91%   BMI 50.79 kg/m²         Intake/Output Summary (Last 24 hours) at 4/27/2021 1413  Last data filed at 4/27/2021 6218  Gross per 24 hour   Intake    Output 1000 ml   Net -1000 ml        Physical Examination:     I had a face to face encounter with this patient and independently examined them on 4/27/2021 as outlined below:          Constitutional:  No acute distress, obese    ENT:  Oral mucosa moist, oropharynx benign. Resp:  good air entry bilateral , no wheezing,no accessory muscle use   CV:  Regular rhythm, normal rate, S1,S2 wnl    GI:  Soft, non distended, obese    Musculoskeletal:  No LE edema    Neurologic:  Moves all extremities. AAOx3     Psych:  not anxious nor agitated. Skin: no rashes or ulcers       Data Review:     I personally reviewed labs and imaging     Labs:     No results for input(s): WBC, HGB, HCT, PLT, HGBEXT, HCTEXT, PLTEXT, HGBEXT, HCTEXT, PLTEXT in the last 72 hours. Recent Labs     04/27/21  1221 04/25/21  0357   * 140  137   K 4.6 3.7  3.6    104  105   CO2 28 28  28   BUN 49* 49*  50*   CREA 1.12 1.18  1.18   * 165*  158*   CA 9.1 8.8  8.7     Recent Labs     04/25/21  0357   ALT 73  74   AP 97  99   TBILI 0.5  0.4   TP 6.2*  6.9   ALB 2.8*  2.7*   GLOB 3.4  4.2*     No results for input(s): INR, PTP, APTT, INREXT, INREXT in the last 72 hours. Recent Labs     04/25/21  0357   FERR 1,470*      No results found for: FOL, RBCF   No results for input(s): PH, PCO2, PO2 in the last 72 hours. No results for input(s): CPK, CKNDX, TROIQ in the last 72 hours.     No lab exists for component: CPKMB  No results found for: CHOL, CHOLX, CHLST, CHOLV, HDL, HDLP, LDL, LDLC, DLDLP, TGLX, TRIGL, TRIGP, CHHD, CHHDX  Lab Results   Component Value Date/Time    Glucose (POC) 132 (H) 04/27/2021 11:57 AM    Glucose (POC) 155 (H) 04/27/2021 06:29 AM    Glucose (POC) 146 (H) 04/26/2021 10:30 PM    Glucose (POC) 131 (H) 04/26/2021 05:03 PM    Glucose (POC) 201 (H) 04/26/2021 12:29 PM     Lab Results   Component Value Date/Time    Color YELLOW/STRAW 04/21/2021 04:16 AM    Appearance CLEAR 04/21/2021 04:16 AM    Specific gravity 1.030 04/21/2021 04:16 AM    pH (UA) 5.0 04/21/2021 04:16 AM    Protein TRACE (A) 04/21/2021 04:16 AM    Glucose Negative 04/21/2021 04:16 AM    Ketone Negative 04/21/2021 04:16 AM    Bilirubin Negative 04/21/2021 04:16 AM    Urobilinogen 0.2 04/21/2021 04:16 AM    Nitrites Negative 04/21/2021 04:16 AM    Leukocyte Esterase Negative 04/21/2021 04:16 AM    Epithelial cells FEW 04/21/2021 04:16 AM    Bacteria Negative 04/21/2021 04:16 AM    WBC 0-4 04/21/2021 04:16 AM    RBC 0-5 04/21/2021 04:16 AM         Medications Reviewed:     Current Facility-Administered Medications   Medication Dose Route Frequency    methylPREDNISolone (PF) (SOLU-MEDROL) injection 40 mg  40 mg IntraVENous Q12H    atorvastatin (LIPITOR) tablet 20 mg  20 mg Oral DAILY    remdesivir 100 mg in 0.9% sodium chloride 250 mL IVPB  100 mg IntraVENous Q24H    furosemide (LASIX) injection 40 mg  40 mg IntraVENous DAILY    losartan/hydroCHLOROthiazide (HYZAAR) 100/25 mg   Oral DAILY    albuterol (PROVENTIL HFA, VENTOLIN HFA, PROAIR HFA) inhaler 2 Puff  2 Puff Inhalation Q4H RT    insulin lispro (HUMALOG) injection   SubCUTAneous AC&HS    glucose chewable tablet 16 g  4 Tab Oral PRN    dextrose (D50W) injection syrg 12.5-25 g  12.5-25 g IntraVENous PRN    glucagon (GLUCAGEN) injection 1 mg  1 mg IntraMUSCular PRN    citalopram (CELEXA) tablet 40 mg  40 mg Oral DAILY    enoxaparin (LOVENOX) injection 40 mg  40 mg SubCUTAneous Q12H    acetaminophen (TYLENOL) tablet 650 mg  650 mg Oral Q6H PRN    Or    acetaminophen (TYLENOL) suppository 650 mg  650 mg Rectal Q6H PRN    guaiFENesin-dextromethorphan (ROBITUSSIN DM) 100-10 mg/5 mL syrup 5 mL  5 mL Oral Q4H PRN    cholecalciferol (VITAMIN D3) (1000 Units /25 mcg) tablet 2,000 Units  2,000 Units Oral DAILY    ascorbic acid (vitamin C) (VITAMIN C) tablet 500 mg  500 mg Oral BID    zinc sulfate (ZINCATE) 50 mg zinc (220 mg) capsule 1 Cap  1 Cap Oral DAILY    sodium chloride (NS) flush 5-40 mL  5-40 mL IntraVENous Q8H    sodium chloride (NS) flush 5-40 mL  5-40 mL IntraVENous PRN     ______________________________________________________________________  EXPECTED LENGTH OF STAY: 5d 9h  ACTUAL LENGTH OF STAY:          7                 Mala Oleary MD

## 2021-04-27 NOTE — PROGRESS NOTES
Physical Therapy  4/27/2021    Chart reviewed. Pt received in bed, on HFNC measuring 93% at rest.  Pt politely declining therapy at this time, declining  gait at bedside/ within room. Reports he didn't get much sleep last night and  prefers to sit up in chair at dinner time and plans to sit up in for a few hours. PT to follow up tomorrow as peter and appropriate.       Mag Means, PTA

## 2021-04-28 LAB
CRP SERPL-MCNC: 0.42 MG/DL (ref 0–0.6)
D DIMER PPP FEU-MCNC: 0.65 MG/L FEU (ref 0–0.65)
GLUCOSE BLD STRIP.AUTO-MCNC: 113 MG/DL (ref 65–100)
GLUCOSE BLD STRIP.AUTO-MCNC: 117 MG/DL (ref 65–100)
GLUCOSE BLD STRIP.AUTO-MCNC: 149 MG/DL (ref 65–100)
GLUCOSE BLD STRIP.AUTO-MCNC: 167 MG/DL (ref 65–100)
SERVICE CMNT-IMP: ABNORMAL

## 2021-04-28 PROCEDURE — 85379 FIBRIN DEGRADATION QUANT: CPT

## 2021-04-28 PROCEDURE — 74011636637 HC RX REV CODE- 636/637: Performed by: HOSPITALIST

## 2021-04-28 PROCEDURE — 86140 C-REACTIVE PROTEIN: CPT

## 2021-04-28 PROCEDURE — 74011250637 HC RX REV CODE- 250/637: Performed by: HOSPITALIST

## 2021-04-28 PROCEDURE — 74011250636 HC RX REV CODE- 250/636: Performed by: PHYSICIAN ASSISTANT

## 2021-04-28 PROCEDURE — 74011250637 HC RX REV CODE- 250/637: Performed by: INTERNAL MEDICINE

## 2021-04-28 PROCEDURE — 74011250636 HC RX REV CODE- 250/636: Performed by: INTERNAL MEDICINE

## 2021-04-28 PROCEDURE — 74011250637 HC RX REV CODE- 250/637: Performed by: NURSE PRACTITIONER

## 2021-04-28 PROCEDURE — 82962 GLUCOSE BLOOD TEST: CPT

## 2021-04-28 PROCEDURE — 97116 GAIT TRAINING THERAPY: CPT

## 2021-04-28 PROCEDURE — 36415 COLL VENOUS BLD VENIPUNCTURE: CPT

## 2021-04-28 PROCEDURE — 74011250636 HC RX REV CODE- 250/636: Performed by: NURSE PRACTITIONER

## 2021-04-28 PROCEDURE — 65660000001 HC RM ICU INTERMED STEPDOWN

## 2021-04-28 PROCEDURE — 77010033711 HC HIGH FLOW OXYGEN

## 2021-04-28 PROCEDURE — 94640 AIRWAY INHALATION TREATMENT: CPT

## 2021-04-28 RX ORDER — PREDNISONE 20 MG/1
40 TABLET ORAL
Status: DISCONTINUED | OUTPATIENT
Start: 2021-04-29 | End: 2021-05-06 | Stop reason: HOSPADM

## 2021-04-28 RX ADMIN — METHYLPREDNISOLONE SODIUM SUCCINATE 40 MG: 40 INJECTION, POWDER, FOR SOLUTION INTRAMUSCULAR; INTRAVENOUS at 23:50

## 2021-04-28 RX ADMIN — OXYCODONE HYDROCHLORIDE AND ACETAMINOPHEN 500 MG: 500 TABLET ORAL at 09:00

## 2021-04-28 RX ADMIN — ALBUTEROL SULFATE 2 PUFF: 90 AEROSOL, METERED RESPIRATORY (INHALATION) at 12:00

## 2021-04-28 RX ADMIN — OXYCODONE HYDROCHLORIDE AND ACETAMINOPHEN 500 MG: 500 TABLET ORAL at 18:00

## 2021-04-28 RX ADMIN — ALBUTEROL SULFATE 2 PUFF: 90 AEROSOL, METERED RESPIRATORY (INHALATION) at 08:00

## 2021-04-28 RX ADMIN — Medication 10 ML: at 23:50

## 2021-04-28 RX ADMIN — FUROSEMIDE 40 MG: 10 INJECTION, SOLUTION INTRAMUSCULAR; INTRAVENOUS at 09:00

## 2021-04-28 RX ADMIN — ENOXAPARIN SODIUM 40 MG: 100 INJECTION SUBCUTANEOUS at 16:00

## 2021-04-28 RX ADMIN — ALBUTEROL SULFATE 2 PUFF: 90 AEROSOL, METERED RESPIRATORY (INHALATION) at 20:00

## 2021-04-28 RX ADMIN — ALBUTEROL SULFATE 2 PUFF: 90 AEROSOL, METERED RESPIRATORY (INHALATION) at 00:59

## 2021-04-28 RX ADMIN — HYDROCHLOROTHIAZIDE: 25 TABLET ORAL at 09:00

## 2021-04-28 RX ADMIN — ATORVASTATIN CALCIUM 20 MG: 20 TABLET, FILM COATED ORAL at 09:00

## 2021-04-28 RX ADMIN — ENOXAPARIN SODIUM 40 MG: 100 INJECTION SUBCUTANEOUS at 04:00

## 2021-04-28 RX ADMIN — CITALOPRAM HYDROBROMIDE 40 MG: 20 TABLET ORAL at 09:00

## 2021-04-28 RX ADMIN — Medication 2000 UNITS: at 09:00

## 2021-04-28 RX ADMIN — METHYLPREDNISOLONE SODIUM SUCCINATE 40 MG: 40 INJECTION, POWDER, FOR SOLUTION INTRAMUSCULAR; INTRAVENOUS at 09:00

## 2021-04-28 RX ADMIN — Medication 1 CAPSULE: at 09:00

## 2021-04-28 RX ADMIN — INSULIN LISPRO 2 UNITS: 100 INJECTION, SOLUTION INTRAVENOUS; SUBCUTANEOUS at 16:30

## 2021-04-28 NOTE — PROGRESS NOTES
Name: [facility]: [address]   : 1970 Admit Date: 2021   Phone: [phone]  Room: [facility]   PCP: None  MRN: [id number]   Date: 2021  Code: Full Code        HPI:      Down to 25lpm and 95% FiO2  CRP 0.42      Down to 30 lpm and 90% Fio2  Feeling better  D dimer 0.61      Walked in to room. Patient was 84% sats on 40lpm and 64% Fio2. I increased to 45lpm and 92% FiO2 and saturations went above 90%   Was about to work with PT  D dimer 0.61  CRP 0.83      Going back and forth with NIV and high flow 60lpm and 90% Fio2  D dimer 0.55  Ferritin   CRP 4.53  probnp 44      CRP 9.33  Now on NIV/high flow  Could not receive remdesivir due to hospital policy   Notable desaturations last night and this am  Feels ok right now  Dicussed with RN       CRP 17.8  Still on 6lpm  Feels about the same   Day 7 of dx       5:44 PM       History was obtained from patient. I was asked by Eduard Danielle MD to see Elaina Pallas in consultation for a chief complaint of shortness of breath. History of Present Illness: 48year old male with past medical history of asthma, allergic rhinitis and morbid obesity who presented to [facility] with increased shortness of breath. Diagnosed with COVID-19 1 week back at [facility]. Last fever 7 days back. Does have cough, shortness of breath and wheezing. On prednisone 20 mg q daily. Data:  Hgb 6.8  Wbc 14  Plt 202  D-dimer 0.79  Cr 1.41  Trop < 0.05  nt pro bnp 25  CRP pending. CXR - bilateral infiltrates. Past Medical History:   Diagnosis Date    Asthma     High cholesterol     Hypertension     Sleep apnea        Past Surgical History:   Procedure Laterality Date    HX HERNIA REPAIR      HX OPEN REDUCTION INTERNAL FIXATION      rt leg       No family history on file.     Social History     Tobacco Use    Smoking status: Never Smoker    Smokeless tobacco: Current User   Substance Use Topics    Alcohol use: Never Frequency: Never       Allergies   Allergen Reactions    Penicillins Unknown (comments)       Current Facility-Administered Medications   Medication Dose Route Frequency    methylPREDNISolone (PF) (SOLU-MEDROL) injection 40 mg  40 mg IntraVENous Q12H    atorvastatin (LIPITOR) tablet 20 mg  20 mg Oral DAILY    furosemide (LASIX) injection 40 mg  40 mg IntraVENous DAILY    losartan/hydroCHLOROthiazide (HYZAAR) 100/25 mg   Oral DAILY    albuterol (PROVENTIL HFA, VENTOLIN HFA, PROAIR HFA) inhaler 2 Puff  2 Puff Inhalation Q4H RT    insulin lispro (HUMALOG) injection   SubCUTAneous AC&HS    glucose chewable tablet 16 g  4 Tab Oral PRN    dextrose (D50W) injection syrg 12.5-25 g  12.5-25 g IntraVENous PRN    glucagon (GLUCAGEN) injection 1 mg  1 mg IntraMUSCular PRN    citalopram (CELEXA) tablet 40 mg  40 mg Oral DAILY    enoxaparin (LOVENOX) injection 40 mg  40 mg SubCUTAneous Q12H    acetaminophen (TYLENOL) tablet 650 mg  650 mg Oral Q6H PRN    Or    acetaminophen (TYLENOL) suppository 650 mg  650 mg Rectal Q6H PRN    guaiFENesin-dextromethorphan (ROBITUSSIN DM) 100-10 mg/5 mL syrup 5 mL  5 mL Oral Q4H PRN    cholecalciferol (VITAMIN D3) (1000 Units /25 mcg) tablet 2,000 Units  2,000 Units Oral DAILY    ascorbic acid (vitamin C) (VITAMIN C) tablet 500 mg  500 mg Oral BID    zinc sulfate (ZINCATE) 50 mg zinc (220 mg) capsule 1 Cap  1 Cap Oral DAILY    sodium chloride (NS) flush 5-40 mL  5-40 mL IntraVENous Q8H    sodium chloride (NS) flush 5-40 mL  5-40 mL IntraVENous PRN         REVIEW OF SYSTEMS   Negative except as stated in the HPI. Physical Exam:   Visit Vitals  /75 (BP 1 Location: Left upper arm, BP Patient Position: At rest)   Pulse 68   Temp 97.8 °F (36.6 °C)   Resp 18   Ht 5' 10\" (1.778 m)   Wt (!) 162.8 kg (358 lb 14.5 oz)   SpO2 90%   BMI 51.50 kg/m²     Patient has COVID-19 infection and examination was deferred. Only inspection was performed.     General:  Alert, cooperative, no distress. Head:  Normocephalic. Eyes:  Conjunctivae/corneas clear. Nose: Nares normal. Septum midline. Extremities: Extremities normal, atraumatic, no cyanosis or edema. Skin: Skin color, texture, turgor normal. No rashes or lesions       Neurologic: Grossly nonfocal       Lab Results   Component Value Date/Time    Sodium 135 (L) 04/27/2021 12:21 PM    Potassium 4.6 04/27/2021 12:21 PM    Chloride 103 04/27/2021 12:21 PM    CO2 28 04/27/2021 12:21 PM    BUN 49 (H) 04/27/2021 12:21 PM    Creatinine 1.12 04/27/2021 12:21 PM    Glucose 136 (H) 04/27/2021 12:21 PM    Calcium 9.1 04/27/2021 12:21 PM    Magnesium 2.9 (H) 04/20/2021 07:12 PM    Lactic acid 0.9 04/20/2021 07:12 PM       Lab Results   Component Value Date/Time    WBC 8.4 04/23/2021 05:51 AM    HGB 14.4 04/23/2021 05:51 AM    PLATELET 921 03/61/3795 05:51 AM    MCV 89.0 04/23/2021 05:51 AM       Lab Results   Component Value Date/Time    INR 1.0 04/20/2021 07:12 PM    aPTT 31.7 (H) 04/20/2021 07:12 PM    Alk. phosphatase 97 04/25/2021 03:57 AM    Alk. phosphatase 99 04/25/2021 03:57 AM    Protein, total 6.2 (L) 04/25/2021 03:57 AM    Protein, total 6.9 04/25/2021 03:57 AM    Albumin 2.8 (L) 04/25/2021 03:57 AM    Albumin 2.7 (L) 04/25/2021 03:57 AM    Globulin 3.4 04/25/2021 03:57 AM    Globulin 4.2 (H) 04/25/2021 03:57 AM       No results found for: PH, PHI, PCO2, PCO2I, PO2, PO2I, HCO3, HCO3I, FIO2, FIO2I    Lab Results   Component Value Date/Time    Troponin-I, Qt. <0.05 04/20/2021 07:12 PM        IMPRESSION:  ===========  -COVID-19.  -COVID-19 pneumonia. O + blood type. Vit D deficient   -hypoxemic respiratory failure. Worsening, suspected ARDS  -asthma.  -seasonal allergies.  -Obesity. PLAN:  =====  -CRP / d-dimer daily.  Check BMP, he may require adjustments to diuretics   -solu medrol ; reduce to PO prednisone   -lovenox bid dosing   -s/p actemra   -diuresis, continue   -O2 supplementation above 90%, high flow/NIV; can transition to midflow   -sleep prone > 8 hours.       Dafne Stone PA-C

## 2021-04-28 NOTE — PROGRESS NOTES
[facility] Adult  Hospitalist Group                                                                                          Hospitalist Progress Note  Emilia Perez MD  Answering service: [phone] OR [phone] from in house phone        Date of Service:  2021  NAME:  Yaron Perez  :  1970  MRN:  [id number]      Admission Summary:   48 y.o. male with past medical history of asthma, hypertension, hyperlipidemia and morbid obesity presented to [facility] ED this morning with increased shortness of breath. Interval history / Subjective:   Patient seen and examined this afternoon. He is feeing better. Sitting in a chair. Now down to 25L Fio2 91%  Doing excellent on incentive spirometer. Assessment & Plan:     # Acute hypoxemic respiratory failure  # COVID 19 pneumonia  - still on high flow  - switched decadron to Solumedrol on a tapering dose   - s/p actemra X 1  - completed Remdesivir   - on vit c and Zinc   - diuresis on IV lasix   - follow daily inflammatory markers/ d-dimer   - pulmonologist following  - low procal,does not need abx  - wean down oxygen as tolerated   - Appt ID input       # HTN:-BP trending up, on Losartan/Hctz combination   # Morbid obesity: BMI 53  # Depression: celexa  # HUC:HTGY      Code status: Full  DVT prophylaxis: lovenox    Care Plan discussed with: patient seen and examined  Anticipated Disposition:tbd  Discharge: Home when medically ready , can be discharged on home oxygen once down to [facility] Problems  Never Reviewed          Codes Class Noted POA    Respiratory failure ([facility] [address] ICD-10-CM: J96.90  ICD-9-CM: 518.81  2021 Unknown        COVID-19 ICD-10-CM: U07.1  ICD-9-CM: 079.89  2021 Unknown                Review of Systems:   Pertinent positive mentioned in interval hx/HPI. Other systems reviewed and negative     Vital Signs:    Last 24hrs VS reviewed since prior progress note.  Most recent are:  Visit Vitals  /81 (BP 1 Location: Right upper arm, BP Patient Position: At rest)   Pulse 87   Temp 97.8 °F (36.6 °C)   Resp 18   Ht 5' 10\" (1.778 m)   Wt (!) 162.8 kg (358 lb 14.5 oz)   SpO2 93%   BMI 51.50 kg/m²         Intake/Output Summary (Last 24 hours) at 4/28/2021 1417  Last data filed at 4/28/2021 1138  Gross per 24 hour   Intake    Output 900 ml   Net -900 ml        Physical Examination:     I had a face to face encounter with this patient and independently examined them on 4/28/2021 as outlined below:          Constitutional:  No acute distress, obese    ENT:  Oral mucosa moist, oropharynx benign. Resp:  good air entry bilateral , no wheezing,no accessory muscle use   CV:  Regular rhythm, normal rate, S1,S2 wnl    GI:  Soft, non distended, obese    Musculoskeletal:  No LE edema    Neurologic:  Moves all extremities. AAOx3     Psych:  not anxious nor agitated. Skin: no rashes or ulcers       Data Review:     I personally reviewed labs and imaging     Labs:     No results for input(s): WBC, HGB, HCT, PLT, HGBEXT, HCTEXT, PLTEXT, HGBEXT, HCTEXT, PLTEXT in the last 72 hours. Recent Labs     04/27/21  1221   *   K 4.6      CO2 28   BUN 49*   CREA 1.12   *   CA 9.1     No results for input(s): ALT, AP, TBIL, TBILI, TP, ALB, GLOB, GGT, AML, LPSE in the last 72 hours. No lab exists for component: SGOT, GPT, AMYP, HLPSE  No results for input(s): INR, PTP, APTT, INREXT, INREXT in the last 72 hours. No results for input(s): FE, TIBC, PSAT, FERR in the last 72 hours. No results found for: FOL, RBCF   No results for input(s): PH, PCO2, PO2 in the last 72 hours. No results for input(s): CPK, CKNDX, TROIQ in the last 72 hours.     No lab exists for component: CPKMB  No results found for: CHOL, CHOLX, CHLST, CHOLV, HDL, HDLP, LDL, LDLC, DLDLP, TGLX, TRIGL, TRIGP, CHHD, CHHDX  Lab Results   Component Value Date/Time    Glucose (POC) 149 (H) 04/28/2021 11:34 AM    Glucose (POC) 113 (H) 04/28/2021 06:47 AM    Glucose (POC) 117 (H) 04/27/2021 09:07 PM    Glucose (POC) 151 (H) 04/27/2021 04:11 PM    Glucose (POC) 132 (H) 04/27/2021 11:57 AM     Lab Results   Component Value Date/Time    Color YELLOW/STRAW 04/21/2021 04:16 AM    Appearance CLEAR 04/21/2021 04:16 AM    Specific gravity 1.030 04/21/2021 04:16 AM    pH (UA) 5.0 04/21/2021 04:16 AM    Protein TRACE (A) 04/21/2021 04:16 AM    Glucose Negative 04/21/2021 04:16 AM    Ketone Negative 04/21/2021 04:16 AM    Bilirubin Negative 04/21/2021 04:16 AM    Urobilinogen 0.2 04/21/2021 04:16 AM    Nitrites Negative 04/21/2021 04:16 AM    Leukocyte Esterase Negative 04/21/2021 04:16 AM    Epithelial cells FEW 04/21/2021 04:16 AM    Bacteria Negative 04/21/2021 04:16 AM    WBC 0-4 04/21/2021 04:16 AM    RBC 0-5 04/21/2021 04:16 AM         Medications Reviewed:     Current Facility-Administered Medications   Medication Dose Route Frequency    methylPREDNISolone (PF) (SOLU-MEDROL) injection 40 mg  40 mg IntraVENous Q12H    [START ON 4/29/2021] predniSONE (DELTASONE) tablet 40 mg  40 mg Oral DAILY WITH BREAKFAST    atorvastatin (LIPITOR) tablet 20 mg  20 mg Oral DAILY    furosemide (LASIX) injection 40 mg  40 mg IntraVENous DAILY    losartan/hydroCHLOROthiazide (HYZAAR) 100/25 mg   Oral DAILY    albuterol (PROVENTIL HFA, VENTOLIN HFA, PROAIR HFA) inhaler 2 Puff  2 Puff Inhalation Q4H RT    insulin lispro (HUMALOG) injection   SubCUTAneous AC&HS    glucose chewable tablet 16 g  4 Tab Oral PRN    dextrose (D50W) injection syrg 12.5-25 g  12.5-25 g IntraVENous PRN    glucagon (GLUCAGEN) injection 1 mg  1 mg IntraMUSCular PRN    citalopram (CELEXA) tablet 40 mg  40 mg Oral DAILY    enoxaparin (LOVENOX) injection 40 mg  40 mg SubCUTAneous Q12H    acetaminophen (TYLENOL) tablet 650 mg  650 mg Oral Q6H PRN    Or    acetaminophen (TYLENOL) suppository 650 mg  650 mg Rectal Q6H PRN    guaiFENesin-dextromethorphan (ROBITUSSIN DM) 100-10 mg/5 mL syrup 5 mL  5 mL Oral Q4H PRN    cholecalciferol (VITAMIN D3) (1000 Units /25 mcg) tablet 2,000 Units  2,000 Units Oral DAILY    ascorbic acid (vitamin C) (VITAMIN C) tablet 500 mg  500 mg Oral BID    zinc sulfate (ZINCATE) 50 mg zinc (220 mg) capsule 1 Cap  1 Cap Oral DAILY    sodium chloride (NS) flush 5-40 mL  5-40 mL IntraVENous Q8H    sodium chloride (NS) flush 5-40 mL  5-40 mL IntraVENous PRN     ______________________________________________________________________  EXPECTED LENGTH OF STAY: 5d 9h  ACTUAL LENGTH OF STAY:          8                 Mala Oleary MD

## 2021-04-28 NOTE — PROGRESS NOTES
Problem: Mobility Impaired (Adult and Pediatric)  Goal: *Acute Goals and Plan of Care (Insert Text)  Description: FUNCTIONAL STATUS PRIOR TO ADMISSION: Patient was independent and active without use of DME.    HOME SUPPORT PRIOR TO ADMISSION: The patient lived with family but did not require assist.    Physical Therapy Goals  Initiated 4/22/2021  1. Patient will move from supine to sit and sit to supine , scoot up and down, and roll side to side in bed with modified independence within 7 day(s). 2.  Patient will transfer from bed to chair and chair to bed with modified independence using the least restrictive device within 7 day(s). 3.  Patient will perform sit to stand with minimal assistance/contact guard assist within 7 day(s). 4.  Patient will ambulate with minimal assistance/contact guard assist for 10 feet with the least restrictive device within 7 day(s). 5.  Patient will ascend/descend 2 stairs with one handrail(s) with minimal assistance/contact guard assist within 7 day(s). Outcome: Progressing Towards Goal   PHYSICAL THERAPY TREATMENT  Patient: Eli Nicole (54 y.o. male)  Date: 4/28/2021  Diagnosis: [facility] [J96.90]  COVID-19 [U07.1] <principal problem not specified>       Precautions: (Droplet Plus)  Chart, physical therapy assessment, plan of care and goals were reviewed. ASSESSMENT  Patient continues with skilled PT services and is progressing towards goals. Patient moving well today. Less complaint of fatigue and able to move about room within limits of high flow without the walker and steady gait. Patient indicates getting up to chair during the day himself and feel he is safe. Remains limited by oxygen needs. Current Level of Function Impacting Discharge (mobility/balance): supervision/modified independent    Other factors to consider for discharge:          PLAN :  Patient continues to benefit from skilled intervention to address the above impairments. Continue treatment per established plan of care. to address goals. Recommendation for discharge: (in order for the patient to meet his/her long term goals)  Physical therapy at least 2 days/week in the home     This discharge recommendation:  Has been made in collaboration with the attending provider and/or case management    IF patient discharges home will need the following DME: patient owns DME required for discharge       SUBJECTIVE:   Patient stated I got some rest so I feel better.     OBJECTIVE DATA SUMMARY:   Critical Behavior:  Neurologic State: Alert  Orientation Level: Oriented X4  Cognition: Follows commands  Safety/Judgement: Awareness of environment, Fall prevention  Functional Mobility Training:  Bed Mobility:     Supine to Sit: Modified independent     Scooting: Modified independent        Transfers:  Sit to Stand: Modified independent  Stand to Sit: Modified independent        Bed to Chair: Modified independent                    Balance:  Sitting: Intact  Standing: Intact  Ambulation/Gait Training:  Distance (ft): 10 Feet (ft)  Assistive Device: Gait belt  Ambulation - Level of Assistance: Supervision                 Base of Support: Widened     Speed/Na: Slow  Step Length: Right shortened;Left shortened            Therapeutic Exercises:   Reviewed exercises he is doing on his own  Pain Ratin    Activity Tolerance:   Fair and desaturates with exertion and requires oxygen    After treatment patient left in no apparent distress:   Sitting in chair and Call bell within reach    COMMUNICATION/COLLABORATION:   The patients plan of care was discussed with: Registered nurse.      Tessy Griffin, PT   Time Calculation: 18 mins

## 2021-04-28 NOTE — ROUTINE PROCESS
Bedside and Verbal shift change report given to Chalo (oncoming nurse) by Nick Zimmer (offgoing nurse). Report included the following information SBAR, Kardex, ED Summary, Intake/Output, MAR, Accordion and Cardiac Rhythm NSR.

## 2021-04-28 NOTE — PROGRESS NOTES
Transitions of Care: 12% risk of re-admission      -Anticipate discharge home with home health, monitor for home Oxygen needs, COVID positive       The CM noted PT recommendations for home health, CM called into patient's room- prior to admission, patient was independent, working, etc. The patient is agreeable for home health upon discharge if ordered from MD- CM reached out to MD inquiring about home health. Patient is agreeable for any agency that would take insurance- lives in [place], anticipate skilled nursing and PT needs pending progress, will monitor. The patient denied any other concerns at this time. The CM will follow for transitions of care. MD agreeable for home health. CM sent referrals to [facility], [facility], and [address] [place] [address]  in [employer]. Will update referral with orders. [facility] [address] has accepted for home health services, will update referral with orders once available.        JAC Hernandez

## 2021-04-28 NOTE — PROGRESS NOTES
Bedside shift change report given to Moises Musa (oncoming nurse) by Aguilar Benavides (offgoing nurse). Report included the following information SBAR, Kardex, ED Summary, Intake/Output, MAR, Accordion, Recent Results, Med Rec Status and Cardiac Rhythm NSR.

## 2021-04-28 NOTE — PROGRESS NOTES
NUTRITION  Reason for Screen: LOS        RECOMMENDATIONS:     1. Continue current diet. Interventions   - continue current diet       Information obtained from:   patient  Diagnosis    Asthma    High cholesterol    Hypertension    Sleep apnea     Pt reviewed for LOS. Pt reports stable appetite/intakes. No needs at this time, and no additional nutrition risk triggers screened at this time. Will continue to follow per policy.      Diet:  regular  Supplements: None  Meal Intake:   Patient Vitals for the past 168 hrs:   % Diet Eaten   04/27/21 1604 76 - 100%   04/25/21 0832 76 - 100%   04/24/21 0800 0   04/23/21 1848 76 - 100%   04/23/21 1304 76 - 100%   04/23/21 0800 0         Weight Changes:   Stable  Wt Readings from Last 10 Encounters:   04/28/21 (!) 162.8 kg (358 lb 14.5 oz)   04/20/21 (!) 170.1 kg (375 lb)       Nutrition-Related Concerns Identified:  None      PLAN:   - Continue current diet    Will revisit per policy    Aliza Simmons RD    Contact via [employer]

## 2021-04-29 LAB
GLUCOSE BLD STRIP.AUTO-MCNC: 140 MG/DL (ref 65–100)
GLUCOSE BLD STRIP.AUTO-MCNC: 143 MG/DL (ref 65–100)
GLUCOSE BLD STRIP.AUTO-MCNC: 156 MG/DL (ref 65–100)
GLUCOSE BLD STRIP.AUTO-MCNC: 181 MG/DL (ref 65–100)
SERVICE CMNT-IMP: ABNORMAL

## 2021-04-29 PROCEDURE — 74011250637 HC RX REV CODE- 250/637: Performed by: INTERNAL MEDICINE

## 2021-04-29 PROCEDURE — 74011250636 HC RX REV CODE- 250/636: Performed by: NURSE PRACTITIONER

## 2021-04-29 PROCEDURE — 74011250637 HC RX REV CODE- 250/637: Performed by: HOSPITALIST

## 2021-04-29 PROCEDURE — 74011636637 HC RX REV CODE- 636/637: Performed by: HOSPITALIST

## 2021-04-29 PROCEDURE — 74011250636 HC RX REV CODE- 250/636: Performed by: PHYSICIAN ASSISTANT

## 2021-04-29 PROCEDURE — 74011636637 HC RX REV CODE- 636/637: Performed by: PHYSICIAN ASSISTANT

## 2021-04-29 PROCEDURE — 94640 AIRWAY INHALATION TREATMENT: CPT

## 2021-04-29 PROCEDURE — 74011250637 HC RX REV CODE- 250/637: Performed by: NURSE PRACTITIONER

## 2021-04-29 PROCEDURE — 77010033711 HC HIGH FLOW OXYGEN

## 2021-04-29 PROCEDURE — 94660 CPAP INITIATION&MGMT: CPT

## 2021-04-29 PROCEDURE — 82962 GLUCOSE BLOOD TEST: CPT

## 2021-04-29 PROCEDURE — 65660000001 HC RM ICU INTERMED STEPDOWN

## 2021-04-29 PROCEDURE — 94664 DEMO&/EVAL PT USE INHALER: CPT

## 2021-04-29 RX ADMIN — OXYCODONE HYDROCHLORIDE AND ACETAMINOPHEN 500 MG: 500 TABLET ORAL at 09:00

## 2021-04-29 RX ADMIN — Medication 2000 UNITS: at 09:00

## 2021-04-29 RX ADMIN — ALBUTEROL SULFATE 2 PUFF: 90 AEROSOL, METERED RESPIRATORY (INHALATION) at 04:00

## 2021-04-29 RX ADMIN — HYDROCHLOROTHIAZIDE: 25 TABLET ORAL at 09:00

## 2021-04-29 RX ADMIN — Medication 10 ML: at 14:00

## 2021-04-29 RX ADMIN — ALBUTEROL SULFATE 2 PUFF: 90 AEROSOL, METERED RESPIRATORY (INHALATION) at 00:00

## 2021-04-29 RX ADMIN — FUROSEMIDE 40 MG: 10 INJECTION, SOLUTION INTRAMUSCULAR; INTRAVENOUS at 09:00

## 2021-04-29 RX ADMIN — CITALOPRAM HYDROBROMIDE 40 MG: 20 TABLET ORAL at 09:00

## 2021-04-29 RX ADMIN — ALBUTEROL SULFATE 2 PUFF: 90 AEROSOL, METERED RESPIRATORY (INHALATION) at 08:00

## 2021-04-29 RX ADMIN — ALBUTEROL SULFATE 2 PUFF: 90 AEROSOL, METERED RESPIRATORY (INHALATION) at 16:00

## 2021-04-29 RX ADMIN — PREDNISONE 40 MG: 20 TABLET ORAL at 08:00

## 2021-04-29 RX ADMIN — ATORVASTATIN CALCIUM 20 MG: 20 TABLET, FILM COATED ORAL at 09:00

## 2021-04-29 RX ADMIN — INSULIN LISPRO 2 UNITS: 100 INJECTION, SOLUTION INTRAVENOUS; SUBCUTANEOUS at 16:30

## 2021-04-29 RX ADMIN — ENOXAPARIN SODIUM 40 MG: 100 INJECTION SUBCUTANEOUS at 16:00

## 2021-04-29 RX ADMIN — ALBUTEROL SULFATE 2 PUFF: 90 AEROSOL, METERED RESPIRATORY (INHALATION) at 20:22

## 2021-04-29 RX ADMIN — ALBUTEROL SULFATE 2 PUFF: 90 AEROSOL, METERED RESPIRATORY (INHALATION) at 12:00

## 2021-04-29 RX ADMIN — INSULIN LISPRO 2 UNITS: 100 INJECTION, SOLUTION INTRAVENOUS; SUBCUTANEOUS at 11:30

## 2021-04-29 RX ADMIN — Medication 10 ML: at 06:00

## 2021-04-29 RX ADMIN — ENOXAPARIN SODIUM 40 MG: 100 INJECTION SUBCUTANEOUS at 04:00

## 2021-04-29 RX ADMIN — OXYCODONE HYDROCHLORIDE AND ACETAMINOPHEN 500 MG: 500 TABLET ORAL at 18:00

## 2021-04-29 RX ADMIN — Medication 1 CAPSULE: at 09:00

## 2021-04-29 RX ADMIN — INSULIN LISPRO 2 UNITS: 100 INJECTION, SOLUTION INTRAVENOUS; SUBCUTANEOUS at 07:30

## 2021-04-29 NOTE — PROGRESS NOTES
Occupational Therapy  4/29/2021    Chart reviewed. Met with pt for OT weekly reassessment. He is able to independently stand and transfer to chair while managing his hi flow (20L) and telemonitor leads. With transfer, pt desaturated to 79% and required about 45 seconds to recover to 88-90%. Reviewed energy conservation techniques for bathing and dressing. Pt has shower chair at home. He indicated understanding to all education and in agreement to have OT discharge from acute services. Will sign off at this time.  Barry Jay, OT      Time 5 min

## 2021-04-29 NOTE — PROGRESS NOTES
Transitions of Care: 12% risk of re-admission      -Anticipate discharge home, [facility] for [facility] when stable, will need home health orders   -Monitor home Oxygen needs   -Family to transport    [address] has accepted for home health services- anticipate RN and PT, will need home health orders. The CM continues to follow, patient now on 20L of High Flow, making progress. Will continue to follow.      JAC Hernandez

## 2021-04-29 NOTE — PROGRESS NOTES
Bedside shift change report given to Austen Riggs [facility] [id number] (oncoming nurse) by Patsy Rankin RN (offgoing nurse). Report included the following information SBAR, Kardex, MAR and Cardiac Rhythm NSR.

## 2021-04-29 NOTE — PROGRESS NOTES
Bedside and Verbal shift change report given to Esteban Shelton (oncoming nurse) by Tim Srinivasan (offgoing nurse). Report included the following information SBAR, Kardex, ED Summary, Procedure Summary, Intake/Output, MAR, Recent Results and Cardiac Rhythm NSR.

## 2021-04-29 NOTE — PROGRESS NOTES
Problem: Mobility Impaired (Adult and Pediatric)  Goal: *Acute Goals and Plan of Care (Insert Text)  Description: FUNCTIONAL STATUS PRIOR TO ADMISSION: Patient was independent and active without use of DME.    HOME SUPPORT PRIOR TO ADMISSION: The patient lived with family but did not require assist.    Physical Therapy Goals  Initiated 4/22/2021  1. Patient will move from supine to sit and sit to supine , scoot up and down, and roll side to side in bed with modified independence within 7 day(s). 2.  Patient will transfer from bed to chair and chair to bed with modified independence using the least restrictive device within 7 day(s). 3.  Patient will perform sit to stand with minimal assistance/contact guard assist within 7 day(s). 4.  Patient will ambulate with minimal assistance/contact guard assist for 10 feet with the least restrictive device within 7 day(s). 5.  Patient will ascend/descend 2 stairs with one handrail(s) with minimal assistance/contact guard assist within 7 day(s). Outcome: Resolved/Met   PHYSICAL THERAPY TREATMENT/DISCHARGE  Patient: Zully Cortez (54 y.o. male)  Date: 4/29/2021  Diagnosis: Respiratory failure ([facility] [address] [J96.90]  COVID-19 [U07.1] <principal problem not specified>       Precautions: (Droplet Plus)  Chart, physical therapy assessment, plan of care and goals were reviewed. ASSESSMENT  Patient continues with skilled PT services and has progressed towards goals. Patient is moving well and independently bed to chair. He remains on high flow of 20 liters and continues to desaturate with activity. Patient is monitoring self with activity and performing exercises as tolerated. He desated to 79% with walking to chair and with seated rest recovered to the 90's within 45 sec. Unable to progress activity at this time due to high oxygen needs. Safe to continue mobilizing in room independently.     Other factors to consider for discharge: oxygen needs         PLAN :  Patient will be discharged from acute skilled physical therapy at this time. Rationale for discharge:  Letitia Kearney reached    Recommendation for discharge: (in order for the patient to meet his/her long term goals)  No skilled physical therapy/ follow up rehabilitation needs identified at this time. May need to reassess closer to discharge but at this time only needs are for endurance and high oxygen needs. This discharge recommendation:  Has not yet been discussed the attending provider and/or case management    IF patient discharges home will need the following DME: none       SUBJECTIVE:   Patient stated I am doing fine. I get up many times during the day and night. Malika Speedy    OBJECTIVE DATA SUMMARY:   Critical Behavior:  Neurologic State: Appropriate for age  Orientation Level: Appropriate for age  Cognition: Appropriate decision making  Safety/Judgement: Awareness of environment, Fall prevention  Functional Mobility Training:  Bed Mobility:     Supine to Sit: Modified independent              Transfers:  Sit to Stand: Independent  Stand to Sit: Independent        Bed to Chair: Independent                    Balance:  Sitting: Intact  Standing: Intact  Ambulation/Gait Training:  Distance (ft): 10 Feet (ft)     Ambulation - Level of Assistance: Independent                 Base of Support: Widened     Speed/Na: Pace decreased (<100 feet/min)  Step Length: Left shortened;Right shortened        Activity Tolerance:   Poor, desaturates with exertion and requires oxygen, and requires frequent rest breaks    After treatment patient left in no apparent distress:   Sitting in chair and Call bell within reach    COMMUNICATION/COLLABORATION:   The patients plan of care was discussed with: Occupational therapist and Registered nurse.      Issac Shankar, PT   Time Calculation: 5 mins

## 2021-04-29 NOTE — PROGRESS NOTES
Problem: Risk for Spread of Infection  Goal: Prevent transmission of infectious organism to others  Description: Prevent the transmission of infectious organisms to other patients, staff members, and visitors. Outcome: Progressing Towards Goal     Problem: Patient Education:  Go to Education Activity  Goal: Patient/Family Education  Outcome: Progressing Towards Goal     Problem: Patient Education: Go to Patient Education Activity  Goal: Patient/Family Education  Outcome: Progressing Towards Goal     Problem: Patient Education: Go to Patient Education Activity  Goal: Patient/Family Education  Outcome: Progressing Towards Goal     Problem: Falls - Risk of  Goal: *Absence of Falls  Description: Document Maxwell Maple Fall Risk and appropriate interventions in the flowsheet.   Outcome: Progressing Towards Goal  Note: Fall Risk Interventions:            Medication Interventions: Patient to call before getting OOB         History of Falls Interventions: Consult care management for discharge planning

## 2021-04-29 NOTE — PROGRESS NOTES
[address] [facility] Adult  Hospitalist Group                                                                                          Hospitalist Progress Note  Nancy Vasquez MD  Answering service: [phone] OR [phone] from in house phone        Date of Service:  2021  NAME:  Chace Wei  :  1970  MRN:  [id number]      Admission Summary:   48 y.o. male with past medical history of asthma, hypertension, hyperlipidemia and morbid obesity presented to [facility] ED this morning with increased shortness of breath. Interval history / Subjective:   Patient seen and examined this afternoon. He was sitting in the chair, states that his breathing is slowly improving. On 20 lt 65% during my eval     Assessment & Plan:     # Acute hypoxemic respiratory failure  # COVID 19 pneumonia  - still on high flow-decreased amount of flow and percent of oxygen   - on Solumedrol on a tapering dose   - s/p actemra X 1  - completed Remdesivir   - on vit c and Zinc   - diuresis on IV lasix , hold diuretics till repeat labs  - follow daily inflammatory markers/ d-dimer   - pulmonologist following  - low procal,does not need abx  - wean down oxygen as tolerated   - Appt ID input       # HTN:-BP wnl, on Losartan/Hctz combination   # Morbid obesity: BMI 53  # Depression: celexa  # WNI:VRVN      Code status: Full  DVT prophylaxis: lovenox    Care Plan discussed with: patient seen and examined  Anticipated Disposition:tbd  Discharge: Home when medically ready , can be discharged on home oxygen once down to [facility] Problems  Never Reviewed          Codes Class Noted POA    Respiratory failure ([facility] [address] ICD-10-CM: J96.90  ICD-9-CM: 518.81  2021 Unknown        COVID-19 ICD-10-CM: U07.1  ICD-9-CM: 079.89  2021 Unknown                Review of Systems:   Pertinent positive mentioned in interval hx/HPI. Other systems reviewed and negative     Vital Signs:    Last 24hrs VS reviewed since prior progress note.  Most recent are:  Visit Vitals  /62 (BP 1 Location: Right lower arm, BP Patient Position: At rest)   Pulse 92   Temp 98.3 °F (36.8 °C)   Resp 16   Ht 5' 10\" (1.778 m)   Wt (!) 162.4 kg (358 lb)   SpO2 95%   BMI 51.37 kg/m²         Intake/Output Summary (Last 24 hours) at 4/29/2021 1944  Last data filed at 4/29/2021 1015  Gross per 24 hour   Intake    Output 1250 ml   Net -1250 ml        Physical Examination:     I had a face to face encounter with this patient and independently examined them on 4/29/2021 as outlined below:          Constitutional:  No acute distress, obese    ENT:  Oral mucosa moist, EOMI,anicteric sclera,normal conjunctiva. Resp:  no wheezing,no accessory muscle use   CV:  Regular rhythm, normal rate, S1,S2 wnl    GI:  Soft, non distended, obese    Musculoskeletal:  No LE edema    Neurologic:  Moves all extremities. AAOx3     Psych:  not anxious nor agitated. Skin: no rashes or ulcers       Data Review:     I personally reviewed labs and imaging results    Labs:     No results for input(s): WBC, HGB, HCT, PLT, HGBEXT, HCTEXT, PLTEXT, HGBEXT, HCTEXT, PLTEXT in the last 72 hours. Recent Labs     04/27/21  1221   *   K 4.6      CO2 28   BUN 49*   CREA 1.12   *   CA 9.1     No results for input(s): ALT, AP, TBIL, TBILI, TP, ALB, GLOB, GGT, AML, LPSE in the last 72 hours. No lab exists for component: SGOT, GPT, AMYP, HLPSE  No results for input(s): INR, PTP, APTT, INREXT, INREXT in the last 72 hours. No results for input(s): FE, TIBC, PSAT, FERR in the last 72 hours. No results found for: FOL, RBCF   No results for input(s): PH, PCO2, PO2 in the last 72 hours. No results for input(s): CPK, CKNDX, TROIQ in the last 72 hours.     No lab exists for component: CPKMB  No results found for: CHOL, CHOLX, CHLST, CHOLV, HDL, HDLP, LDL, LDLC, DLDLP, TGLX, TRIGL, TRIGP, CHHD, CHHDX  Lab Results   Component Value Date/Time    Glucose (POC) 156 (H) 04/29/2021 03:58 PM    Glucose (POC) 140 (H) 04/29/2021 11:11 AM    Glucose (POC) 143 (H) 04/29/2021 06:33 AM    Glucose (POC) 117 (H) 04/28/2021 09:25 PM    Glucose (POC) 167 (H) 04/28/2021 04:22 PM     Lab Results   Component Value Date/Time    Color YELLOW/STRAW 04/21/2021 04:16 AM    Appearance CLEAR 04/21/2021 04:16 AM    Specific gravity 1.030 04/21/2021 04:16 AM    pH (UA) 5.0 04/21/2021 04:16 AM    Protein TRACE (A) 04/21/2021 04:16 AM    Glucose Negative 04/21/2021 04:16 AM    Ketone Negative 04/21/2021 04:16 AM    Bilirubin Negative 04/21/2021 04:16 AM    Urobilinogen 0.2 04/21/2021 04:16 AM    Nitrites Negative 04/21/2021 04:16 AM    Leukocyte Esterase Negative 04/21/2021 04:16 AM    Epithelial cells FEW 04/21/2021 04:16 AM    Bacteria Negative 04/21/2021 04:16 AM    WBC 0-4 04/21/2021 04:16 AM    RBC 0-5 04/21/2021 04:16 AM         Medications Reviewed:     Current Facility-Administered Medications   Medication Dose Route Frequency    predniSONE (DELTASONE) tablet 40 mg  40 mg Oral DAILY WITH BREAKFAST    atorvastatin (LIPITOR) tablet 20 mg  20 mg Oral DAILY    furosemide (LASIX) injection 40 mg  40 mg IntraVENous DAILY    losartan/hydroCHLOROthiazide (HYZAAR) 100/25 mg   Oral DAILY    albuterol (PROVENTIL HFA, VENTOLIN HFA, PROAIR HFA) inhaler 2 Puff  2 Puff Inhalation Q4H RT    insulin lispro (HUMALOG) injection   SubCUTAneous AC&HS    glucose chewable tablet 16 g  4 Tab Oral PRN    dextrose (D50W) injection syrg 12.5-25 g  12.5-25 g IntraVENous PRN    glucagon (GLUCAGEN) injection 1 mg  1 mg IntraMUSCular PRN    citalopram (CELEXA) tablet 40 mg  40 mg Oral DAILY    enoxaparin (LOVENOX) injection 40 mg  40 mg SubCUTAneous Q12H    acetaminophen (TYLENOL) tablet 650 mg  650 mg Oral Q6H PRN    Or    acetaminophen (TYLENOL) suppository 650 mg  650 mg Rectal Q6H PRN    guaiFENesin-dextromethorphan (ROBITUSSIN DM) 100-10 mg/5 mL syrup 5 mL  5 mL Oral Q4H PRN    cholecalciferol (VITAMIN D3) (1000 Units /25 mcg) tablet 2,000 Units  2,000 Units Oral DAILY    ascorbic acid (vitamin C) (VITAMIN C) tablet 500 mg  500 mg Oral BID    zinc sulfate (ZINCATE) 50 mg zinc (220 mg) capsule 1 Cap  1 Cap Oral DAILY    sodium chloride (NS) flush 5-40 mL  5-40 mL IntraVENous Q8H    sodium chloride (NS) flush 5-40 mL  5-40 mL IntraVENous PRN     ______________________________________________________________________  EXPECTED LENGTH OF STAY: 5d 9h  ACTUAL LENGTH OF STAY:          9                 Edilberto Marie MD

## 2021-04-29 NOTE — PROGRESS NOTES
Bedside and Verbal shift change report given to Keny (oncoming nurse) by Stefany Sun (offgoing nurse). Report included the following information SBAR, Kardex, Intake/Output, MAR, Recent Results and Cardiac Rhythm NSR.

## 2021-04-29 NOTE — PROGRESS NOTES
Name: [facility]: Sreedhar Strickland   : 1970 Admit Date: 2021   Phone: [phone]  Room: [facility]   PCP: None  MRN: [id number]   Date: 2021  Code: Full Code        HPI:      I reduced to 20lpm and 95% FIO2  Sitting up   Feels great      Down to 25lpm and 95% FiO2  CRP 0.42      Down to 30 lpm and 90% Fio2  Feeling better  D dimer 0.61      Walked in to room. Patient was 84% sats on 40lpm and 64% Fio2. I increased to 45lpm and 92% FiO2 and saturations went above 90%   Was about to work with PT  D dimer 0.61  CRP 0.83      Going back and forth with NIV and high flow 60lpm and 90% Fio2  D dimer 0.55  Ferritin   CRP 4.53  probnp 44      CRP 9.33  Now on NIV/high flow  Could not receive remdesivir due to hospital policy   Notable desaturations last night and this am  Feels ok right now  Dicussed with RN       CRP 17.8  Still on 6lpm  Feels about the same   Day 7 of dx       5:44 PM       History was obtained from patient. I was asked by Terri Andersen MD to see Edwin Scott in consultation for a chief complaint of shortness of breath. History of Present Illness: 48year old male with past medical history of asthma, allergic rhinitis and morbid obesity who presented to [facility] with increased shortness of breath. Diagnosed with COVID-19 1 week back at [facility]. Last fever 7 days back. Does have cough, shortness of breath and wheezing. On prednisone 20 mg q daily. Data:  Hgb 6.8  Wbc 14  Plt 202  D-dimer 0.79  Cr 1.41  Trop < 0.05  nt pro bnp 25  CRP pending. CXR - bilateral infiltrates. Past Medical History:   Diagnosis Date    Asthma     High cholesterol     Hypertension     Sleep apnea        Past Surgical History:   Procedure Laterality Date    HX HERNIA REPAIR      HX OPEN REDUCTION INTERNAL FIXATION      rt leg       No family history on file.     Social History     Tobacco Use    Smoking status: Never Smoker    Smokeless tobacco: Current User   Substance Use Topics    Alcohol use: Never     Frequency: Never       Allergies   Allergen Reactions    Penicillins Unknown (comments)       Current Facility-Administered Medications   Medication Dose Route Frequency    predniSONE (DELTASONE) tablet 40 mg  40 mg Oral DAILY WITH BREAKFAST    atorvastatin (LIPITOR) tablet 20 mg  20 mg Oral DAILY    furosemide (LASIX) injection 40 mg  40 mg IntraVENous DAILY    losartan/hydroCHLOROthiazide (HYZAAR) 100/25 mg   Oral DAILY    albuterol (PROVENTIL HFA, VENTOLIN HFA, PROAIR HFA) inhaler 2 Puff  2 Puff Inhalation Q4H RT    insulin lispro (HUMALOG) injection   SubCUTAneous AC&HS    glucose chewable tablet 16 g  4 Tab Oral PRN    dextrose (D50W) injection syrg 12.5-25 g  12.5-25 g IntraVENous PRN    glucagon (GLUCAGEN) injection 1 mg  1 mg IntraMUSCular PRN    citalopram (CELEXA) tablet 40 mg  40 mg Oral DAILY    enoxaparin (LOVENOX) injection 40 mg  40 mg SubCUTAneous Q12H    acetaminophen (TYLENOL) tablet 650 mg  650 mg Oral Q6H PRN    Or    acetaminophen (TYLENOL) suppository 650 mg  650 mg Rectal Q6H PRN    guaiFENesin-dextromethorphan (ROBITUSSIN DM) 100-10 mg/5 mL syrup 5 mL  5 mL Oral Q4H PRN    cholecalciferol (VITAMIN D3) (1000 Units /25 mcg) tablet 2,000 Units  2,000 Units Oral DAILY    ascorbic acid (vitamin C) (VITAMIN C) tablet 500 mg  500 mg Oral BID    zinc sulfate (ZINCATE) 50 mg zinc (220 mg) capsule 1 Cap  1 Cap Oral DAILY    sodium chloride (NS) flush 5-40 mL  5-40 mL IntraVENous Q8H    sodium chloride (NS) flush 5-40 mL  5-40 mL IntraVENous PRN         REVIEW OF SYSTEMS   Negative except as stated in the HPI. Physical Exam:   Visit Vitals  /83 (BP 1 Location: Right upper arm, BP Patient Position: Sitting)   Pulse 95   Temp 97.9 °F (36.6 °C)   Resp 16   Ht 5' 10\" (1.778 m)   Wt (!) 162.4 kg (358 lb)   SpO2 97%   BMI 51.37 kg/m²     Patient has COVID-19 infection and examination was deferred.  Only inspection was performed. General:  Alert, cooperative, no distress. Head:  Normocephalic. Eyes:  Conjunctivae/corneas clear. Nose: Nares normal. Septum midline. Extremities: Extremities normal, atraumatic, no cyanosis or edema. Skin: Skin color, texture, turgor normal. No rashes or lesions       Neurologic: Grossly nonfocal       Lab Results   Component Value Date/Time    Sodium 135 (L) 04/27/2021 12:21 PM    Potassium 4.6 04/27/2021 12:21 PM    Chloride 103 04/27/2021 12:21 PM    CO2 28 04/27/2021 12:21 PM    BUN 49 (H) 04/27/2021 12:21 PM    Creatinine 1.12 04/27/2021 12:21 PM    Glucose 136 (H) 04/27/2021 12:21 PM    Calcium 9.1 04/27/2021 12:21 PM    Magnesium 2.9 (H) 04/20/2021 07:12 PM    Lactic acid 0.9 04/20/2021 07:12 PM       Lab Results   Component Value Date/Time    WBC 8.4 04/23/2021 05:51 AM    HGB 14.4 04/23/2021 05:51 AM    PLATELET 093 59/38/1718 05:51 AM    MCV 89.0 04/23/2021 05:51 AM       Lab Results   Component Value Date/Time    INR 1.0 04/20/2021 07:12 PM    aPTT 31.7 (H) 04/20/2021 07:12 PM    Alk. phosphatase 97 04/25/2021 03:57 AM    Alk. phosphatase 99 04/25/2021 03:57 AM    Protein, total 6.2 (L) 04/25/2021 03:57 AM    Protein, total 6.9 04/25/2021 03:57 AM    Albumin 2.8 (L) 04/25/2021 03:57 AM    Albumin 2.7 (L) 04/25/2021 03:57 AM    Globulin 3.4 04/25/2021 03:57 AM    Globulin 4.2 (H) 04/25/2021 03:57 AM       No results found for: PH, PHI, PCO2, PCO2I, PO2, PO2I, HCO3, HCO3I, FIO2, FIO2I    Lab Results   Component Value Date/Time    Troponin-I, Qt. <0.05 04/20/2021 07:12 PM        IMPRESSION:  ===========  -COVID-19.  -COVID-19 pneumonia. O + blood type. Vit D deficient   -hypoxemic respiratory failure. Worsening, suspected ARDS  -asthma.  -seasonal allergies.  -Obesity. PLAN:  =====  -CRP / d-dimer daily.    -PO prednisone   -lovenox bid dosing   -s/p actemra   -diuresis, continue   -O2 supplementation above 90%, NIV qhs; can transition to midflow   -sleep prone > 8 hours.       Deon Buitrago PA-C

## 2021-04-30 LAB
ANION GAP SERPL CALC-SCNC: 8 MMOL/L (ref 5–15)
BASOPHILS # BLD: 0 K/UL (ref 0–0.1)
BASOPHILS NFR BLD: 0 % (ref 0–1)
BUN SERPL-MCNC: 52 MG/DL (ref 6–20)
BUN/CREAT SERPL: 47 (ref 12–20)
CALCIUM SERPL-MCNC: 9.2 MG/DL (ref 8.5–10.1)
CHLORIDE SERPL-SCNC: 103 MMOL/L (ref 97–108)
CO2 SERPL-SCNC: 27 MMOL/L (ref 21–32)
CREAT SERPL-MCNC: 1.11 MG/DL (ref 0.7–1.3)
D DIMER PPP FEU-MCNC: 0.49 MG/L FEU (ref 0–0.65)
DIFFERENTIAL METHOD BLD: ABNORMAL
EOSINOPHIL # BLD: 0.1 K/UL (ref 0–0.4)
EOSINOPHIL NFR BLD: 1 % (ref 0–7)
ERYTHROCYTE [DISTWIDTH] IN BLOOD BY AUTOMATED COUNT: 12.7 % (ref 11.5–14.5)
GLUCOSE BLD STRIP.AUTO-MCNC: 100 MG/DL (ref 65–100)
GLUCOSE BLD STRIP.AUTO-MCNC: 112 MG/DL (ref 65–100)
GLUCOSE BLD STRIP.AUTO-MCNC: 117 MG/DL (ref 65–100)
GLUCOSE BLD STRIP.AUTO-MCNC: 147 MG/DL (ref 65–100)
GLUCOSE SERPL-MCNC: 105 MG/DL (ref 65–100)
HCT VFR BLD AUTO: 46.1 % (ref 36.6–50.3)
HGB BLD-MCNC: 15 G/DL (ref 12.1–17)
IMM GRANULOCYTES # BLD AUTO: 0 K/UL (ref 0–0.04)
IMM GRANULOCYTES NFR BLD AUTO: 0 % (ref 0–0.5)
LYMPHOCYTES # BLD: 0.7 K/UL (ref 0.8–3.5)
LYMPHOCYTES NFR BLD: 7 % (ref 12–49)
MCH RBC QN AUTO: 28.9 PG (ref 26–34)
MCHC RBC AUTO-ENTMCNC: 32.5 G/DL (ref 30–36.5)
MCV RBC AUTO: 88.8 FL (ref 80–99)
MONOCYTES # BLD: 1 K/UL (ref 0–1)
MONOCYTES NFR BLD: 10 % (ref 5–13)
NEUTS SEG # BLD: 7.8 K/UL (ref 1.8–8)
NEUTS SEG NFR BLD: 82 % (ref 32–75)
NRBC # BLD: 0 K/UL (ref 0–0.01)
NRBC BLD-RTO: 0 PER 100 WBC
PLATELET # BLD AUTO: 192 K/UL (ref 150–400)
PMV BLD AUTO: 11.1 FL (ref 8.9–12.9)
POTASSIUM SERPL-SCNC: 3.8 MMOL/L (ref 3.5–5.1)
RBC # BLD AUTO: 5.19 M/UL (ref 4.1–5.7)
RBC MORPH BLD: ABNORMAL
SERVICE CMNT-IMP: ABNORMAL
SERVICE CMNT-IMP: NORMAL
SODIUM SERPL-SCNC: 138 MMOL/L (ref 136–145)
WBC # BLD AUTO: 9.6 K/UL (ref 4.1–11.1)

## 2021-04-30 PROCEDURE — 94660 CPAP INITIATION&MGMT: CPT

## 2021-04-30 PROCEDURE — 65660000001 HC RM ICU INTERMED STEPDOWN

## 2021-04-30 PROCEDURE — 80048 BASIC METABOLIC PNL TOTAL CA: CPT

## 2021-04-30 PROCEDURE — 36415 COLL VENOUS BLD VENIPUNCTURE: CPT

## 2021-04-30 PROCEDURE — 74011250637 HC RX REV CODE- 250/637: Performed by: HOSPITALIST

## 2021-04-30 PROCEDURE — 94640 AIRWAY INHALATION TREATMENT: CPT

## 2021-04-30 PROCEDURE — 74011250637 HC RX REV CODE- 250/637: Performed by: INTERNAL MEDICINE

## 2021-04-30 PROCEDURE — 74011250636 HC RX REV CODE- 250/636: Performed by: NURSE PRACTITIONER

## 2021-04-30 PROCEDURE — 74011636637 HC RX REV CODE- 636/637: Performed by: HOSPITALIST

## 2021-04-30 PROCEDURE — 74011636637 HC RX REV CODE- 636/637: Performed by: PHYSICIAN ASSISTANT

## 2021-04-30 PROCEDURE — 85025 COMPLETE CBC W/AUTO DIFF WBC: CPT

## 2021-04-30 PROCEDURE — 85379 FIBRIN DEGRADATION QUANT: CPT

## 2021-04-30 PROCEDURE — 94664 DEMO&/EVAL PT USE INHALER: CPT

## 2021-04-30 PROCEDURE — 74011250637 HC RX REV CODE- 250/637: Performed by: NURSE PRACTITIONER

## 2021-04-30 PROCEDURE — 82962 GLUCOSE BLOOD TEST: CPT

## 2021-04-30 RX ADMIN — INSULIN LISPRO 2 UNITS: 100 INJECTION, SOLUTION INTRAVENOUS; SUBCUTANEOUS at 16:30

## 2021-04-30 RX ADMIN — ENOXAPARIN SODIUM 40 MG: 100 INJECTION SUBCUTANEOUS at 04:00

## 2021-04-30 RX ADMIN — ALBUTEROL SULFATE 2 PUFF: 90 AEROSOL, METERED RESPIRATORY (INHALATION) at 01:10

## 2021-04-30 RX ADMIN — OXYCODONE HYDROCHLORIDE AND ACETAMINOPHEN 500 MG: 500 TABLET ORAL at 18:00

## 2021-04-30 RX ADMIN — ALBUTEROL SULFATE 2 PUFF: 90 AEROSOL, METERED RESPIRATORY (INHALATION) at 23:11

## 2021-04-30 RX ADMIN — CITALOPRAM HYDROBROMIDE 40 MG: 20 TABLET ORAL at 09:00

## 2021-04-30 RX ADMIN — OXYCODONE HYDROCHLORIDE AND ACETAMINOPHEN 500 MG: 500 TABLET ORAL at 09:00

## 2021-04-30 RX ADMIN — ALBUTEROL SULFATE 2 PUFF: 90 AEROSOL, METERED RESPIRATORY (INHALATION) at 12:00

## 2021-04-30 RX ADMIN — ALBUTEROL SULFATE 2 PUFF: 90 AEROSOL, METERED RESPIRATORY (INHALATION) at 19:19

## 2021-04-30 RX ADMIN — Medication 2000 UNITS: at 09:00

## 2021-04-30 RX ADMIN — PREDNISONE 40 MG: 20 TABLET ORAL at 08:00

## 2021-04-30 RX ADMIN — ENOXAPARIN SODIUM 40 MG: 100 INJECTION SUBCUTANEOUS at 16:00

## 2021-04-30 RX ADMIN — ATORVASTATIN CALCIUM 20 MG: 20 TABLET, FILM COATED ORAL at 09:00

## 2021-04-30 RX ADMIN — ALBUTEROL SULFATE 2 PUFF: 90 AEROSOL, METERED RESPIRATORY (INHALATION) at 08:00

## 2021-04-30 RX ADMIN — ALBUTEROL SULFATE 2 PUFF: 90 AEROSOL, METERED RESPIRATORY (INHALATION) at 04:00

## 2021-04-30 RX ADMIN — ALBUTEROL SULFATE 2 PUFF: 90 AEROSOL, METERED RESPIRATORY (INHALATION) at 16:00

## 2021-04-30 RX ADMIN — HYDROCHLOROTHIAZIDE: 25 TABLET ORAL at 09:00

## 2021-04-30 RX ADMIN — Medication 10 ML: at 14:00

## 2021-04-30 RX ADMIN — Medication 1 CAPSULE: at 09:00

## 2021-04-30 NOTE — PROGRESS NOTES
[address] [facility] Adult  Hospitalist Group                                                                                          Hospitalist Progress Note  Darinel Gomez MD  Answering service: [phone] OR [phone] from in house phone              Progress Note    Patient: Elver Menezes MRN: [id number]  SSN: [id number]    YOB: 1970  Age: 48 y.o. Sex: male      Admit Date: 4/20/2021    LOS: 10 days     Subjective:     Patient presents with acute hypoxic respiratory failure due to COVID-19 pneumonia. Patient is currently requiring 25 L of oxygen. Otherwise no acute complaint. Denies any chest pain, shortness of breath, dizziness, headache, nausea, vomiting or abdominal pain    Objective:     Vitals:    04/30/21 0748 04/30/21 0848 04/30/21 1141 04/30/21 1219   BP:  (!) 154/96 138/69    Pulse:  83 95    Resp:  23 27    Temp:  97.6 °F (36.4 °C) 97.8 °F (36.6 °C)    SpO2: 93% 92% 91% 91%   Weight:       Height:            Intake and Output:  Current Shift: No intake/output data recorded. Last three shifts: 04/28 1901 - 04/30 0700  In: -   Out: 1250 [Urine:1250]    Physical Exam:   GENERAL: alert, cooperative  THROAT & NECK: normal and no erythema or exudates noted. LUNG: clear to auscultation bilaterally  HEART: regular rate and rhythm, S1, S2 normal, no murmur, click, rub or gallop  ABDOMEN: soft, non-tender. Bowel sounds normal. No masses,  no organomegaly  EXTREMITIES:  extremities normal, atraumatic, no cyanosis or edema  SKIN: no rash or abnormalities  NEUROLOGIC: AOx3. PSYCHIATRIC: non focal    Lab/Data Review: All lab results for the last 24 hours reviewed.      Recent Results (from the past 24 hour(s))   GLUCOSE, POC    Collection Time: 04/29/21  3:58 PM   Result Value Ref Range    Glucose (POC) 156 (H) 65 - 100 mg/dL    Performed by Robert Amaya    GLUCOSE, POC    Collection Time: 04/29/21  9:18 PM   Result Value Ref Range    Glucose (POC) 181 (H) 65 - 100 mg/dL Performed by Leah Patel    D DIMER    Collection Time: 04/30/21  4:21 AM   Result Value Ref Range    D-dimer 0.49 0.00 - 0.65 mg/L FEU   CBC WITH AUTOMATED DIFF    Collection Time: 04/30/21  4:21 AM   Result Value Ref Range    WBC 9.6 4.1 - 11.1 K/uL    RBC 5.19 4. 10 - 5.70 M/uL    HGB 15.0 12.1 - 17.0 g/dL    HCT 46.1 36.6 - 50.3 %    MCV 88.8 80.0 - 99.0 FL    MCH 28.9 26.0 - 34.0 PG    MCHC 32.5 30.0 - 36.5 g/dL    RDW 12.7 11.5 - 14.5 %    PLATELET 594 988 - 099 K/uL    MPV 11.1 8.9 - 12.9 FL    NRBC 0.0 0  WBC    ABSOLUTE NRBC 0.00 0.00 - 0.01 K/uL    NEUTROPHILS 82 (H) 32 - 75 %    LYMPHOCYTES 7 (L) 12 - 49 %    MONOCYTES 10 5 - 13 %    EOSINOPHILS 1 0 - 7 %    BASOPHILS 0 0 - 1 %    IMMATURE GRANULOCYTES 0 0.0 - 0.5 %    ABS. NEUTROPHILS 7.8 1.8 - 8.0 K/UL    ABS. LYMPHOCYTES 0.7 (L) 0.8 - 3.5 K/UL    ABS. MONOCYTES 1.0 0.0 - 1.0 K/UL    ABS. EOSINOPHILS 0.1 0.0 - 0.4 K/UL    ABS. BASOPHILS 0.0 0.0 - 0.1 K/UL    ABS. IMM. GRANS. 0.0 0.00 - 0.04 K/UL    DF SMEAR SCANNED      RBC COMMENTS NORMOCYTIC, NORMOCHROMIC     METABOLIC PANEL, BASIC    Collection Time: 04/30/21  4:21 AM   Result Value Ref Range    Sodium 138 136 - 145 mmol/L    Potassium 3.8 3.5 - 5.1 mmol/L    Chloride 103 97 - 108 mmol/L    CO2 27 21 - 32 mmol/L    Anion gap 8 5 - 15 mmol/L    Glucose 105 (H) 65 - 100 mg/dL    BUN 52 (H) 6 - 20 MG/DL    Creatinine 1.11 0.70 - 1.30 MG/DL    BUN/Creatinine ratio 47 (H) 12 - 20      GFR est AA >60 >60 ml/min/1.73m2    GFR est non-AA >60 >60 ml/min/1.73m2    Calcium 9.2 8.5 - 10.1 MG/DL   GLUCOSE, POC    Collection Time: 04/30/21  6:54 AM   Result Value Ref Range    Glucose (POC) 117 (H) 65 - 100 mg/dL    Performed by [address] [place] [address], POC    Collection Time: 04/30/21 12:34 PM   Result Value Ref Range    Glucose (POC) 112 (H) 65 - 100 mg/dL    Performed by Noris Isaacs         Imaging:    No results found.        Assessment and Plan:     Acute respiratory failure  -Acute hypoxic respiratory failure due to COVID-19 pneumonia  -Patient currently requiring 25 L of oxygen   -Pulmonology following    COVID-19 pneumonia  -Chest x-ray with appearance of bilateral infiltrates  -Patient has received 1 dose of Actemra and completed remdesivir  -On Solu-Medrol, tapering, on diuresis with Lasix  -Pulmonology following    Hypertension  -Continue losartan and HCTZ    Depression  -On Celexa    Dyslipidemia  -On statin    Obstructive sleep apnea  -On CPAP    Morbid obesity  -With BMI of 51.68  -Outpatient follow-up for weight management    Discharge disposition: Likely more than 48 hours as patient is still unstable with requirement for high flow oxygen.     Signed By: Axel Alcantara MD     April 30, 2021

## 2021-04-30 NOTE — PROGRESS NOTES
Name: [facility]: Melvi Dates   : 1970 Admit Date: 2021   Phone: [phone]  Room: [facility]   PCP: None  MRN: [id number]   Date: 2021  Code: Full Code        HPI:      Upon entry saturations were upper 80s and low 90s on 25lpm and 54% Fio2; I reduced flow to 20lpm and increased FiO2 to 94% and saturations went up to mid 90s and stabilized. Please continue wean Flow.   I reduced to 20lpm and 95% FIO2  Sitting up   Feels great      Down to 25lpm and 95% FiO2  CRP 0.42      Down to 30 lpm and 90% Fio2  Feeling better  D dimer 0.61      Walked in to room. Patient was 84% sats on 40lpm and 64% Fio2. I increased to 45lpm and 92% FiO2 and saturations went above 90%   Was about to work with PT  D dimer 0.61  CRP 0.83      Going back and forth with NIV and high flow 60lpm and 90% Fio2  D dimer 0.55  Ferritin   CRP 4.53  probnp 44      CRP 9.33  Now on NIV/high flow  Could not receive remdesivir due to hospital policy   Notable desaturations last night and this am  Feels ok right now  Dicussed with RN       CRP 17.8  Still on 6lpm  Feels about the same   Day 7 of dx       5:44 PM       History was obtained from patient. I was asked by Adry Torres MD to see Francine Damon in consultation for a chief complaint of shortness of breath. History of Present Illness: 48year old male with past medical history of asthma, allergic rhinitis and morbid obesity who presented to [facility] with increased shortness of breath. Diagnosed with COVID-19 1 week back at [facility]. Last fever 7 days back. Does have cough, shortness of breath and wheezing. On prednisone 20 mg q daily. Data:  Hgb 6.8  Wbc 14  Plt 202  D-dimer 0.79  Cr 1.41  Trop < 0.05  nt pro bnp 25  CRP pending. CXR - bilateral infiltrates.     Past Medical History:   Diagnosis Date    Asthma     High cholesterol     Hypertension     Sleep apnea        Past Surgical History: Procedure Laterality Date    HX HERNIA REPAIR      HX OPEN REDUCTION INTERNAL FIXATION      rt leg       No family history on file. Social History     Tobacco Use    Smoking status: Never Smoker    Smokeless tobacco: Current User   Substance Use Topics    Alcohol use: Never     Frequency: Never       Allergies   Allergen Reactions    Penicillins Unknown (comments)       Current Facility-Administered Medications   Medication Dose Route Frequency    predniSONE (DELTASONE) tablet 40 mg  40 mg Oral DAILY WITH BREAKFAST    atorvastatin (LIPITOR) tablet 20 mg  20 mg Oral DAILY    [Held by provider] furosemide (LASIX) injection 40 mg  40 mg IntraVENous DAILY    losartan/hydroCHLOROthiazide (HYZAAR) 100/25 mg   Oral DAILY    albuterol (PROVENTIL HFA, VENTOLIN HFA, PROAIR HFA) inhaler 2 Puff  2 Puff Inhalation Q4H RT    insulin lispro (HUMALOG) injection   SubCUTAneous AC&HS    glucose chewable tablet 16 g  4 Tab Oral PRN    dextrose (D50W) injection syrg 12.5-25 g  12.5-25 g IntraVENous PRN    glucagon (GLUCAGEN) injection 1 mg  1 mg IntraMUSCular PRN    citalopram (CELEXA) tablet 40 mg  40 mg Oral DAILY    enoxaparin (LOVENOX) injection 40 mg  40 mg SubCUTAneous Q12H    acetaminophen (TYLENOL) tablet 650 mg  650 mg Oral Q6H PRN    Or    acetaminophen (TYLENOL) suppository 650 mg  650 mg Rectal Q6H PRN    guaiFENesin-dextromethorphan (ROBITUSSIN DM) 100-10 mg/5 mL syrup 5 mL  5 mL Oral Q4H PRN    cholecalciferol (VITAMIN D3) (1000 Units /25 mcg) tablet 2,000 Units  2,000 Units Oral DAILY    ascorbic acid (vitamin C) (VITAMIN C) tablet 500 mg  500 mg Oral BID    zinc sulfate (ZINCATE) 50 mg zinc (220 mg) capsule 1 Cap  1 Cap Oral DAILY    sodium chloride (NS) flush 5-40 mL  5-40 mL IntraVENous Q8H    sodium chloride (NS) flush 5-40 mL  5-40 mL IntraVENous PRN         REVIEW OF SYSTEMS   Negative except as stated in the HPI.       Physical Exam:   Visit Vitals  BP (!) 154/96 (BP 1 Location: Right lower arm, BP Patient Position: At rest)   Pulse 83   Temp 97.6 °F (36.4 °C)   Resp 23   Ht 5' 10\" (1.778 m)   Wt (!) 163.4 kg (360 lb 3.2 oz)   SpO2 92%   BMI 51.68 kg/m²     Patient has COVID-19 infection and examination was deferred. Only inspection was performed. General:  Alert, cooperative, no distress. Head:  Normocephalic. Eyes:  Conjunctivae/corneas clear. Nose: Nares normal. Septum midline. Extremities: Extremities normal, atraumatic, no cyanosis or edema. Skin: Skin color, texture, turgor normal. No rashes or lesions       Neurologic: Grossly nonfocal       Lab Results   Component Value Date/Time    Sodium 138 04/30/2021 04:21 AM    Potassium 3.8 04/30/2021 04:21 AM    Chloride 103 04/30/2021 04:21 AM    CO2 27 04/30/2021 04:21 AM    BUN 52 (H) 04/30/2021 04:21 AM    Creatinine 1.11 04/30/2021 04:21 AM    Glucose 105 (H) 04/30/2021 04:21 AM    Calcium 9.2 04/30/2021 04:21 AM    Magnesium 2.9 (H) 04/20/2021 07:12 PM    Lactic acid 0.9 04/20/2021 07:12 PM       Lab Results   Component Value Date/Time    WBC 9.6 04/30/2021 04:21 AM    HGB 15.0 04/30/2021 04:21 AM    PLATELET 391 78/28/9985 04:21 AM    MCV 88.8 04/30/2021 04:21 AM       Lab Results   Component Value Date/Time    INR 1.0 04/20/2021 07:12 PM    aPTT 31.7 (H) 04/20/2021 07:12 PM    Alk. phosphatase 97 04/25/2021 03:57 AM    Alk. phosphatase 99 04/25/2021 03:57 AM    Protein, total 6.2 (L) 04/25/2021 03:57 AM    Protein, total 6.9 04/25/2021 03:57 AM    Albumin 2.8 (L) 04/25/2021 03:57 AM    Albumin 2.7 (L) 04/25/2021 03:57 AM    Globulin 3.4 04/25/2021 03:57 AM    Globulin 4.2 (H) 04/25/2021 03:57 AM       No results found for: PH, PHI, PCO2, PCO2I, PO2, PO2I, HCO3, HCO3I, FIO2, FIO2I    Lab Results   Component Value Date/Time    Troponin-I, Qt. <0.05 04/20/2021 07:12 PM        IMPRESSION:  ===========  -COVID-19.  -COVID-19 pneumonia. O + blood type. Vit D deficient   -hypoxemic respiratory failure. Worsening, suspected ARDS  -asthma.  -seasonal allergies.  -Obesity. PLAN:  =====  -PO prednisone   -lovenox bid dosing   -s/p actemra   -diuresis, noted on hold   -O2 supplementation above 90%, NIV qhs; on high flow. Notable desaturations with ambulation, but at rest in the mid 90s. Suspect body habitus is prolonging need to Higher flow rates. Continue to wean   -sleep prone > 8 hours.   PRN over the weekend       Khurram Pro PA-C

## 2021-04-30 NOTE — PROGRESS NOTES
Bedside shift change report given to Kari Farias (oncoming nurse) by Maury Nolasco (offgoing nurse). Report included the following information SBAR, Kardex, ED Summary, Intake/Output, MAR, Accordion, Recent Results, Med Rec Status and Cardiac Rhythm NSR.

## 2021-04-30 NOTE — PROGRESS NOTES
04/30/21 0115   CPAP/BIPAP   CPAP/BIPAP Start/Stop Off  (pt declined)   Device Mode CPAP   $$ CPAP Daily Yes  (on S/B in room)

## 2021-04-30 NOTE — ROUTINE PROCESS
Bedside and Verbal shift change report given to April (oncoming nurse) by Moises Musa (offgoing nurse). Report included the following information SBAR, Kardex, ED Summary, Intake/Output, MAR, Recent Results and Cardiac Rhythm NSR.

## 2021-04-30 NOTE — PROGRESS NOTES
Transitions of Care: 12% risk of re-admission      -Anticipate discharge home when stable with [facility] (need home health orders), monitor home Oxygen needs   -PCP follow-up (has PCP at [facility])      The patient is currently on 25L of High Flow Oxygen- anticipate to remain IP through-the-weekend, CM reached out to attending MD regarding home health orders- anticipate RN for pulmonary assessment/med rec, and PT- [facility] has accepted. The CM will follow for transitions of care. CM sent orders to [facility], skilled nursing and PT.     11:25 a.m.- CM attempted to reach patient in room, unable to reach- CM called the patient's daughter Kaushal Lake- notified her of above- if patient continues to improve, may not need home health, but being proactive in case needs arise [place] K[place]hal [place] endorses that family is having ongoing conversations about where patient will discharge- they want to know primarily if patient will need to be on quarantine at home, (concerned about exposing son who lives with patient), or if he will be outside of the quarantine window- will ask MD to call Kaushal Lake and give her an update. Kaushal Yemier is supportive, family is discussing options if patient does not d/c directly home- family has a [address] and are talking about patient maybe going there, but if home health is needed, TBD if they would service the zip code- will monitor.        JAC Talbot

## 2021-05-01 LAB
ALBUMIN SERPL-MCNC: 2 G/DL (ref 3.5–5)
ALBUMIN/GLOB SERPL: 0.5 {RATIO} (ref 1.1–2.2)
ALP SERPL-CCNC: 88 U/L (ref 45–117)
ALT SERPL-CCNC: 193 U/L (ref 12–78)
ANION GAP SERPL CALC-SCNC: 7 MMOL/L (ref 5–15)
AST SERPL-CCNC: 34 U/L (ref 15–37)
BASOPHILS # BLD: 0 K/UL (ref 0–0.1)
BASOPHILS NFR BLD: 0 % (ref 0–1)
BILIRUB SERPL-MCNC: 0.9 MG/DL (ref 0.2–1)
BUN SERPL-MCNC: 56 MG/DL (ref 6–20)
BUN/CREAT SERPL: 44 (ref 12–20)
CALCIUM SERPL-MCNC: 8.8 MG/DL (ref 8.5–10.1)
CHLORIDE SERPL-SCNC: 105 MMOL/L (ref 97–108)
CO2 SERPL-SCNC: 26 MMOL/L (ref 21–32)
CREAT SERPL-MCNC: 1.26 MG/DL (ref 0.7–1.3)
D DIMER PPP FEU-MCNC: 0.78 MG/L FEU (ref 0–0.65)
DIFFERENTIAL METHOD BLD: ABNORMAL
EOSINOPHIL # BLD: 0.1 K/UL (ref 0–0.4)
EOSINOPHIL NFR BLD: 2 % (ref 0–7)
ERYTHROCYTE [DISTWIDTH] IN BLOOD BY AUTOMATED COUNT: 12.6 % (ref 11.5–14.5)
GLOBULIN SER CALC-MCNC: 4.2 G/DL (ref 2–4)
GLUCOSE BLD STRIP.AUTO-MCNC: 119 MG/DL (ref 65–100)
GLUCOSE BLD STRIP.AUTO-MCNC: 132 MG/DL (ref 65–100)
GLUCOSE BLD STRIP.AUTO-MCNC: 178 MG/DL (ref 65–100)
GLUCOSE BLD STRIP.AUTO-MCNC: 98 MG/DL (ref 65–100)
GLUCOSE SERPL-MCNC: 95 MG/DL (ref 65–100)
HCT VFR BLD AUTO: 46.2 % (ref 36.6–50.3)
HGB BLD-MCNC: 14.9 G/DL (ref 12.1–17)
IMM GRANULOCYTES # BLD AUTO: 0.1 K/UL (ref 0–0.04)
IMM GRANULOCYTES NFR BLD AUTO: 1 % (ref 0–0.5)
LYMPHOCYTES # BLD: 1.1 K/UL (ref 0.8–3.5)
LYMPHOCYTES NFR BLD: 13 % (ref 12–49)
MCH RBC QN AUTO: 28.2 PG (ref 26–34)
MCHC RBC AUTO-ENTMCNC: 32.3 G/DL (ref 30–36.5)
MCV RBC AUTO: 87.5 FL (ref 80–99)
MONOCYTES # BLD: 1 K/UL (ref 0–1)
MONOCYTES NFR BLD: 12 % (ref 5–13)
NEUTS SEG # BLD: 6.2 K/UL (ref 1.8–8)
NEUTS SEG NFR BLD: 72 % (ref 32–75)
NRBC # BLD: 0 K/UL (ref 0–0.01)
NRBC BLD-RTO: 0 PER 100 WBC
PLATELET # BLD AUTO: 207 K/UL (ref 150–400)
PMV BLD AUTO: 11 FL (ref 8.9–12.9)
POTASSIUM SERPL-SCNC: 3.6 MMOL/L (ref 3.5–5.1)
PROT SERPL-MCNC: 6.2 G/DL (ref 6.4–8.2)
RBC # BLD AUTO: 5.28 M/UL (ref 4.1–5.7)
SERVICE CMNT-IMP: ABNORMAL
SERVICE CMNT-IMP: NORMAL
SODIUM SERPL-SCNC: 138 MMOL/L (ref 136–145)
WBC # BLD AUTO: 8.5 K/UL (ref 4.1–11.1)

## 2021-05-01 PROCEDURE — 36415 COLL VENOUS BLD VENIPUNCTURE: CPT

## 2021-05-01 PROCEDURE — 77010033711 HC HIGH FLOW OXYGEN

## 2021-05-01 PROCEDURE — 65660000001 HC RM ICU INTERMED STEPDOWN

## 2021-05-01 PROCEDURE — 74011636637 HC RX REV CODE- 636/637: Performed by: HOSPITALIST

## 2021-05-01 PROCEDURE — 82962 GLUCOSE BLOOD TEST: CPT

## 2021-05-01 PROCEDURE — 74011250637 HC RX REV CODE- 250/637: Performed by: INTERNAL MEDICINE

## 2021-05-01 PROCEDURE — 74011250637 HC RX REV CODE- 250/637: Performed by: HOSPITALIST

## 2021-05-01 PROCEDURE — 94760 N-INVAS EAR/PLS OXIMETRY 1: CPT

## 2021-05-01 PROCEDURE — 80053 COMPREHEN METABOLIC PANEL: CPT

## 2021-05-01 PROCEDURE — 74011250636 HC RX REV CODE- 250/636: Performed by: NURSE PRACTITIONER

## 2021-05-01 PROCEDURE — 74011250637 HC RX REV CODE- 250/637: Performed by: FAMILY MEDICINE

## 2021-05-01 PROCEDURE — 85379 FIBRIN DEGRADATION QUANT: CPT

## 2021-05-01 PROCEDURE — 74011636637 HC RX REV CODE- 636/637: Performed by: PHYSICIAN ASSISTANT

## 2021-05-01 PROCEDURE — 85025 COMPLETE CBC W/AUTO DIFF WBC: CPT

## 2021-05-01 PROCEDURE — 94640 AIRWAY INHALATION TREATMENT: CPT

## 2021-05-01 PROCEDURE — 74011250637 HC RX REV CODE- 250/637: Performed by: NURSE PRACTITIONER

## 2021-05-01 RX ORDER — POLYETHYLENE GLYCOL 3350 17 G/17G
17 POWDER, FOR SOLUTION ORAL DAILY
Status: DISCONTINUED | OUTPATIENT
Start: 2021-05-01 | End: 2021-05-06 | Stop reason: HOSPADM

## 2021-05-01 RX ADMIN — PREDNISONE 40 MG: 20 TABLET ORAL at 08:58

## 2021-05-01 RX ADMIN — POLYETHYLENE GLYCOL 3350 17 G: 17 POWDER, FOR SOLUTION ORAL at 15:14

## 2021-05-01 RX ADMIN — ALBUTEROL SULFATE 2 PUFF: 90 AEROSOL, METERED RESPIRATORY (INHALATION) at 12:00

## 2021-05-01 RX ADMIN — INSULIN LISPRO 2 UNITS: 100 INJECTION, SOLUTION INTRAVENOUS; SUBCUTANEOUS at 17:22

## 2021-05-01 RX ADMIN — HYDROCHLOROTHIAZIDE: 25 TABLET ORAL at 08:58

## 2021-05-01 RX ADMIN — ATORVASTATIN CALCIUM 20 MG: 20 TABLET, FILM COATED ORAL at 08:59

## 2021-05-01 RX ADMIN — ENOXAPARIN SODIUM 40 MG: 100 INJECTION SUBCUTANEOUS at 15:14

## 2021-05-01 RX ADMIN — Medication 10 ML: at 21:54

## 2021-05-01 RX ADMIN — CITALOPRAM HYDROBROMIDE 40 MG: 20 TABLET ORAL at 08:58

## 2021-05-01 RX ADMIN — ALBUTEROL SULFATE 2 PUFF: 90 AEROSOL, METERED RESPIRATORY (INHALATION) at 03:11

## 2021-05-01 RX ADMIN — Medication 10 ML: at 00:16

## 2021-05-01 RX ADMIN — ALBUTEROL SULFATE 2 PUFF: 90 AEROSOL, METERED RESPIRATORY (INHALATION) at 20:00

## 2021-05-01 RX ADMIN — ENOXAPARIN SODIUM 40 MG: 100 INJECTION SUBCUTANEOUS at 04:32

## 2021-05-01 RX ADMIN — Medication 10 ML: at 13:09

## 2021-05-01 RX ADMIN — OXYCODONE HYDROCHLORIDE AND ACETAMINOPHEN 500 MG: 500 TABLET ORAL at 08:59

## 2021-05-01 RX ADMIN — ALBUTEROL SULFATE 2 PUFF: 90 AEROSOL, METERED RESPIRATORY (INHALATION) at 08:00

## 2021-05-01 RX ADMIN — OXYCODONE HYDROCHLORIDE AND ACETAMINOPHEN 500 MG: 500 TABLET ORAL at 18:03

## 2021-05-01 RX ADMIN — Medication 10 ML: at 06:33

## 2021-05-01 RX ADMIN — Medication 2000 UNITS: at 08:57

## 2021-05-01 RX ADMIN — Medication 1 CAPSULE: at 08:58

## 2021-05-01 RX ADMIN — ALBUTEROL SULFATE 2 PUFF: 90 AEROSOL, METERED RESPIRATORY (INHALATION) at 16:00

## 2021-05-01 NOTE — PROGRESS NOTES
[facility] [facility] Adult  Hospitalist Group                                                                                          Hospitalist Progress Note  Nazia Nieto MD  Answering service: [phone] OR [phone] from in house phone              Progress Note    Patient: Sharda Urbina MRN: [id number]  SSN: [id number]    YOB: 1970  Age: 48 y.o. Sex: male      Admit Date: 4/20/2021    LOS: 11 days     Subjective:     Patient presents with acute hypoxic respiratory failure due to COVID-19 pneumonia. Patient is currently requiring 25 L of oxygen. Otherwise no acute complaint. Denies any chest pain, shortness of breath, dizziness, headache, nausea, vomiting or abdominal pain    Objective:     Vitals:    04/30/21 2335 05/01/21 0311 05/01/21 0430 05/01/21 0852   BP: (!) 125/54  122/81 137/77   Pulse: 96  79 85   Resp: 23  22 18   Temp: 97.9 °F (36.6 °C)  97.6 °F (36.4 °C) 97.6 °F (36.4 °C)   SpO2: 95% 96% 90% 92%   Weight:   (!) 161 kg (354 lb 14.4 oz)    Height:            Intake and Output:  Current Shift: No intake/output data recorded. Last three shifts: 04/29 1901 - 05/01 0700  In: -   Out: 2100 [Urine:2100]    Physical Exam:   GENERAL: alert, cooperative  THROAT & NECK: normal and no erythema or exudates noted. LUNG: clear to auscultation bilaterally  HEART: regular rate and rhythm, S1, S2 normal, no murmur, click, rub or gallop  ABDOMEN: soft, non-tender. Bowel sounds normal. No masses,  no organomegaly  EXTREMITIES:  extremities normal, atraumatic, no cyanosis or edema  SKIN: no rash or abnormalities  NEUROLOGIC: AOx3. PSYCHIATRIC: non focal    Lab/Data Review: All lab results for the last 24 hours reviewed.      Recent Results (from the past 24 hour(s))   GLUCOSE, POC    Collection Time: 04/30/21 12:34 PM   Result Value Ref Range    Glucose (POC) 112 (H) 65 - 100 mg/dL    Performed by Bossman Boyce    GLUCOSE, POC    Collection Time: 04/30/21  5:55 PM   Result Value Ref Range    Glucose (POC) 147 (H) 65 - 100 mg/dL    Performed by Roslyn Carty    GLUCOSE, POC    Collection Time: 04/30/21 11:42 PM   Result Value Ref Range    Glucose (POC) 100 65 - 100 mg/dL    Performed by Renaldo Malone    GLUCOSE, POC    Collection Time: 05/01/21  6:12 AM   Result Value Ref Range    Glucose (POC) 98 65 - 100 mg/dL    Performed by Renaldo Malone    D DIMER    Collection Time: 05/01/21  6:33 AM   Result Value Ref Range    D-dimer 0.78 (H) 0.00 - 0.65 mg/L FEU   CBC WITH AUTOMATED DIFF    Collection Time: 05/01/21  6:33 AM   Result Value Ref Range    WBC 8.5 4.1 - 11.1 K/uL    RBC 5.28 4.10 - 5.70 M/uL    HGB 14.9 12.1 - 17.0 g/dL    HCT 46.2 36.6 - 50.3 %    MCV 87.5 80.0 - 99.0 FL    MCH 28.2 26.0 - 34.0 PG    MCHC 32.3 30.0 - 36.5 g/dL    RDW 12.6 11.5 - 14.5 %    PLATELET 210 153 - 827 K/uL    MPV 11.0 8.9 - 12.9 FL    NRBC 0.0 0  WBC    ABSOLUTE NRBC 0.00 0.00 - 0.01 K/uL    NEUTROPHILS 72 32 - 75 %    LYMPHOCYTES 13 12 - 49 %    MONOCYTES 12 5 - 13 %    EOSINOPHILS 2 0 - 7 %    BASOPHILS 0 0 - 1 %    IMMATURE GRANULOCYTES 1 (H) 0.0 - 0.5 %    ABS. NEUTROPHILS 6.2 1.8 - 8.0 K/UL    ABS. LYMPHOCYTES 1.1 0.8 - 3.5 K/UL    ABS. MONOCYTES 1.0 0.0 - 1.0 K/UL    ABS. EOSINOPHILS 0.1 0.0 - 0.4 K/UL    ABS. BASOPHILS 0.0 0.0 - 0.1 K/UL    ABS. IMM. GRANS. 0.1 (H) 0.00 - 0.04 K/UL    DF AUTOMATED     METABOLIC PANEL, COMPREHENSIVE    Collection Time: 05/01/21  6:33 AM   Result Value Ref Range    Sodium 138 136 - 145 mmol/L    Potassium 3.6 3.5 - 5.1 mmol/L    Chloride 105 97 - 108 mmol/L    CO2 26 21 - 32 mmol/L    Anion gap 7 5 - 15 mmol/L    Glucose 95 65 - 100 mg/dL    BUN 56 (H) 6 - 20 MG/DL    Creatinine 1.26 0.70 - 1.30 MG/DL    BUN/Creatinine ratio 44 (H) 12 - 20      GFR est AA >60 >60 ml/min/1.73m2    GFR est non-AA >60 >60 ml/min/1.73m2    Calcium 8.8 8.5 - 10.1 MG/DL    Bilirubin, total 0.9 0.2 - 1.0 MG/DL    ALT (SGPT) 193 (H) 12 - 78 U/L    AST (SGOT) 34 15 - 37 U/L    Alk. phosphatase 88 45 - 117 U/L    Protein, total 6.2 (L) 6.4 - 8.2 g/dL    Albumin 2.0 (L) 3.5 - 5.0 g/dL    Globulin 4.2 (H) 2.0 - 4.0 g/dL    A-G Ratio 0.5 (L) 1.1 - 2.2          Imaging:    No results found. Assessment and Plan:     Acute respiratory failure  -Acute hypoxic respiratory failure due to COVID-19 pneumonia  -Patient currently requiring 20 L of oxygen   -Pulmonology following    COVID-19 pneumonia  -Chest x-ray with appearance of bilateral infiltrates  -Patient has received 1 dose of Actemra and completed remdesivir  -Solu-Medrol was switched to prednisone, on diuresis with Lasix  -Pulmonology following    Abnormal LFTs  -Likely from viral infection, patient asymptomatic  -Repeat LFTs in a.m. Hypertension  -Continue losartan and HCTZ    Depression  -On Celexa    Dyslipidemia  -On statin    Obstructive sleep apnea  -On CPAP    Morbid obesity  -With BMI of 51.68  -Outpatient follow-up for weight management    Discharge disposition: Likely more than 48 hours as patient is still unstable with requirement for high flow oxygen.     Signed By: Enma Amado MD     May 1, 2021

## 2021-05-01 NOTE — PROGRESS NOTES
Bedside shift change report given to Nayeli Bell RN (oncoming nurse) by Alexandrea Joshua RN (offgoing nurse).  Report included the following information SBAR, Intake/Output, MAR, Recent Results and Cardiac Rhythm SR.

## 2021-05-01 NOTE — PROGRESS NOTES
Bedside and Verbal shift change report given to Lilli Schrader (oncoming nurse) by Jonny Gonzales (offgoing nurse). Report included the following information SBAR, Kardex, ED Summary, Procedure Summary, Intake/Output, MAR, Recent Results and Cardiac Rhythm NSR.

## 2021-05-02 LAB
ALBUMIN SERPL-MCNC: 3 G/DL (ref 3.5–5)
ALBUMIN/GLOB SERPL: 0.9 {RATIO} (ref 1.1–2.2)
ALP SERPL-CCNC: 88 U/L (ref 45–117)
ALT SERPL-CCNC: 180 U/L (ref 12–78)
ANION GAP SERPL CALC-SCNC: 7 MMOL/L (ref 5–15)
AST SERPL-CCNC: 34 U/L (ref 15–37)
BASOPHILS # BLD: 0 K/UL (ref 0–0.1)
BASOPHILS NFR BLD: 0 % (ref 0–1)
BILIRUB SERPL-MCNC: 0.9 MG/DL (ref 0.2–1)
BUN SERPL-MCNC: 58 MG/DL (ref 6–20)
BUN/CREAT SERPL: 43 (ref 12–20)
CALCIUM SERPL-MCNC: 8.5 MG/DL (ref 8.5–10.1)
CHLORIDE SERPL-SCNC: 107 MMOL/L (ref 97–108)
CO2 SERPL-SCNC: 26 MMOL/L (ref 21–32)
CREAT SERPL-MCNC: 1.35 MG/DL (ref 0.7–1.3)
D DIMER PPP FEU-MCNC: 1.96 MG/L FEU (ref 0–0.65)
DIFFERENTIAL METHOD BLD: NORMAL
EOSINOPHIL # BLD: 0.1 K/UL (ref 0–0.4)
EOSINOPHIL NFR BLD: 1 % (ref 0–7)
ERYTHROCYTE [DISTWIDTH] IN BLOOD BY AUTOMATED COUNT: 12.8 % (ref 11.5–14.5)
GLOBULIN SER CALC-MCNC: 3.4 G/DL (ref 2–4)
GLUCOSE BLD STRIP.AUTO-MCNC: 108 MG/DL (ref 65–100)
GLUCOSE BLD STRIP.AUTO-MCNC: 126 MG/DL (ref 65–100)
GLUCOSE BLD STRIP.AUTO-MCNC: 135 MG/DL (ref 65–100)
GLUCOSE BLD STRIP.AUTO-MCNC: 87 MG/DL (ref 65–100)
GLUCOSE SERPL-MCNC: 87 MG/DL (ref 65–100)
HCT VFR BLD AUTO: 46.2 % (ref 36.6–50.3)
HGB BLD-MCNC: 14.9 G/DL (ref 12.1–17)
IMM GRANULOCYTES # BLD AUTO: 0 K/UL (ref 0–0.04)
IMM GRANULOCYTES NFR BLD AUTO: 0 % (ref 0–0.5)
LYMPHOCYTES # BLD: 1.2 K/UL (ref 0.8–3.5)
LYMPHOCYTES NFR BLD: 15 % (ref 12–49)
MCH RBC QN AUTO: 28.7 PG (ref 26–34)
MCHC RBC AUTO-ENTMCNC: 32.3 G/DL (ref 30–36.5)
MCV RBC AUTO: 88.8 FL (ref 80–99)
MONOCYTES # BLD: 1 K/UL (ref 0–1)
MONOCYTES NFR BLD: 13 % (ref 5–13)
NEUTS SEG # BLD: 5.3 K/UL (ref 1.8–8)
NEUTS SEG NFR BLD: 71 % (ref 32–75)
NRBC # BLD: 0 K/UL (ref 0–0.01)
NRBC BLD-RTO: 0 PER 100 WBC
PLATELET # BLD AUTO: 208 K/UL (ref 150–400)
PMV BLD AUTO: 11.5 FL (ref 8.9–12.9)
POTASSIUM SERPL-SCNC: 3.7 MMOL/L (ref 3.5–5.1)
PROT SERPL-MCNC: 6.4 G/DL (ref 6.4–8.2)
RBC # BLD AUTO: 5.2 M/UL (ref 4.1–5.7)
SERVICE CMNT-IMP: ABNORMAL
SERVICE CMNT-IMP: NORMAL
SODIUM SERPL-SCNC: 140 MMOL/L (ref 136–145)
WBC # BLD AUTO: 7.6 K/UL (ref 4.1–11.1)

## 2021-05-02 PROCEDURE — 82962 GLUCOSE BLOOD TEST: CPT

## 2021-05-02 PROCEDURE — 85379 FIBRIN DEGRADATION QUANT: CPT

## 2021-05-02 PROCEDURE — 80053 COMPREHEN METABOLIC PANEL: CPT

## 2021-05-02 PROCEDURE — 74011250637 HC RX REV CODE- 250/637: Performed by: FAMILY MEDICINE

## 2021-05-02 PROCEDURE — 74011000250 HC RX REV CODE- 250: Performed by: NURSE PRACTITIONER

## 2021-05-02 PROCEDURE — 74011636637 HC RX REV CODE- 636/637: Performed by: PHYSICIAN ASSISTANT

## 2021-05-02 PROCEDURE — 74011250636 HC RX REV CODE- 250/636: Performed by: NURSE PRACTITIONER

## 2021-05-02 PROCEDURE — 36415 COLL VENOUS BLD VENIPUNCTURE: CPT

## 2021-05-02 PROCEDURE — 74011250637 HC RX REV CODE- 250/637: Performed by: NURSE PRACTITIONER

## 2021-05-02 PROCEDURE — 74011250637 HC RX REV CODE- 250/637: Performed by: INTERNAL MEDICINE

## 2021-05-02 PROCEDURE — 74011250637 HC RX REV CODE- 250/637: Performed by: HOSPITALIST

## 2021-05-02 PROCEDURE — 65660000001 HC RM ICU INTERMED STEPDOWN

## 2021-05-02 PROCEDURE — 85025 COMPLETE CBC W/AUTO DIFF WBC: CPT

## 2021-05-02 PROCEDURE — 94640 AIRWAY INHALATION TREATMENT: CPT

## 2021-05-02 RX ORDER — ALBUTEROL SULFATE 90 UG/1
2 AEROSOL, METERED RESPIRATORY (INHALATION)
Status: DISCONTINUED | OUTPATIENT
Start: 2021-05-03 | End: 2021-05-06 | Stop reason: HOSPADM

## 2021-05-02 RX ADMIN — HYDROCHLOROTHIAZIDE: 25 TABLET ORAL at 08:14

## 2021-05-02 RX ADMIN — Medication 10 ML: at 13:17

## 2021-05-02 RX ADMIN — CITALOPRAM HYDROBROMIDE 40 MG: 20 TABLET ORAL at 08:13

## 2021-05-02 RX ADMIN — ALBUTEROL SULFATE 2 PUFF: 90 AEROSOL, METERED RESPIRATORY (INHALATION) at 08:00

## 2021-05-02 RX ADMIN — Medication 1 CAPSULE: at 08:14

## 2021-05-02 RX ADMIN — ALBUTEROL SULFATE 2 PUFF: 90 AEROSOL, METERED RESPIRATORY (INHALATION) at 19:29

## 2021-05-02 RX ADMIN — ALBUTEROL SULFATE 2 PUFF: 90 AEROSOL, METERED RESPIRATORY (INHALATION) at 04:19

## 2021-05-02 RX ADMIN — Medication 2000 UNITS: at 08:14

## 2021-05-02 RX ADMIN — ENOXAPARIN SODIUM 40 MG: 100 INJECTION SUBCUTANEOUS at 17:02

## 2021-05-02 RX ADMIN — ALBUTEROL SULFATE 2 PUFF: 90 AEROSOL, METERED RESPIRATORY (INHALATION) at 01:45

## 2021-05-02 RX ADMIN — PREDNISONE 40 MG: 20 TABLET ORAL at 08:14

## 2021-05-02 RX ADMIN — ALTEPLASE 1 MG: 2.2 INJECTION, POWDER, LYOPHILIZED, FOR SOLUTION INTRAVENOUS at 06:33

## 2021-05-02 RX ADMIN — ATORVASTATIN CALCIUM 20 MG: 20 TABLET, FILM COATED ORAL at 08:15

## 2021-05-02 RX ADMIN — ALBUTEROL SULFATE 2 PUFF: 90 AEROSOL, METERED RESPIRATORY (INHALATION) at 12:00

## 2021-05-02 RX ADMIN — ENOXAPARIN SODIUM 40 MG: 100 INJECTION SUBCUTANEOUS at 04:33

## 2021-05-02 RX ADMIN — ALBUTEROL SULFATE 2 PUFF: 90 AEROSOL, METERED RESPIRATORY (INHALATION) at 16:00

## 2021-05-02 RX ADMIN — OXYCODONE HYDROCHLORIDE AND ACETAMINOPHEN 500 MG: 500 TABLET ORAL at 08:15

## 2021-05-02 RX ADMIN — OXYCODONE HYDROCHLORIDE AND ACETAMINOPHEN 500 MG: 500 TABLET ORAL at 17:33

## 2021-05-02 RX ADMIN — Medication 10 ML: at 22:00

## 2021-05-02 RX ADMIN — Medication 10 ML: at 06:34

## 2021-05-02 NOTE — PROGRESS NOTES
Bedside and Verbal shift change report given to Lorrie Apgar (oncoming nurse) by Osorio Goldstein (offgoing nurse). Report included the following information SBAR, Kardex, ED Summary, Procedure Summary, Intake/Output, MAR, Recent Results and Cardiac Rhythm NSR.

## 2021-05-02 NOTE — PROGRESS NOTES
[facility] Adult  Hospitalist Group                                                                                          Hospitalist Progress Note  Enma Amado MD  Answering service: [phone] OR [phone] from in house phone              Progress Note    Patient: Mei Diaz MRN: [id number]  SSN: [id number]    YOB: 1970  Age: 48 y.o. Sex: male      Admit Date: 4/20/2021    LOS: 12 days     Subjective:     Patient presents with acute hypoxic respiratory failure due to COVID-19 pneumonia. Patient is currently requiring 25 L of oxygen. Otherwise no acute complaint. Denies any chest pain, shortness of breath, dizziness, headache, nausea, vomiting or abdominal pain. Objective:     Vitals:    05/02/21 0145 05/02/21 0411 05/02/21 0423 05/02/21 0758   BP:  139/79  121/88   Pulse:  75  95   Resp:  18  20   Temp:  97.5 °F (36.4 °C)  97.7 °F (36.5 °C)   SpO2: 93% 94% 96% 97%   Weight:  (!) 158.8 kg (350 lb 1.6 oz)     Height:            Intake and Output:  Current Shift: No intake/output data recorded. Last three shifts: 04/30 1901 - 05/02 0700  In: -   Out: 8907 [Urine:2575]    Physical Exam:   GENERAL: alert, cooperative  THROAT & NECK: normal and no erythema or exudates noted. LUNG: clear to auscultation bilaterally  HEART: regular rate and rhythm, S1, S2 normal, no murmur, click, rub or gallop  ABDOMEN: soft, non-tender. Bowel sounds normal. No masses,  no organomegaly  EXTREMITIES:  extremities normal, atraumatic, no cyanosis or edema  SKIN: no rash or abnormalities  NEUROLOGIC: AOx3. PSYCHIATRIC: non focal    Lab/Data Review: All lab results for the last 24 hours reviewed.      Recent Results (from the past 24 hour(s))   GLUCOSE, POC    Collection Time: 05/01/21 11:55 AM   Result Value Ref Range    Glucose (POC) 119 (H) 65 - 100 mg/dL    Performed by Playner[employer] Woo[employer] , POC    Collection Time: 05/01/21  5:18 PM   Result Value Ref Range    Glucose (POC) 178 (H) 65 - 100 mg/dL    Performed by Nu Childress POC    Collection Time: 05/01/21  9:20 PM   Result Value Ref Range    Glucose (POC) 132 (H) 65 - 100 mg/dL    Performed by Ricci Thomas    GLUCOSE, POC    Collection Time: 05/02/21  6:39 AM   Result Value Ref Range    Glucose (POC) 87 65 - 100 mg/dL    Performed by Alexandrea Caballero (CON)    D DIMER    Collection Time: 05/02/21  8:20 AM   Result Value Ref Range    D-dimer 1.96 (H) 0.00 - 0.65 mg/L FEU   CBC WITH AUTOMATED DIFF    Collection Time: 05/02/21  8:20 AM   Result Value Ref Range    WBC 7.6 4.1 - 11.1 K/uL    RBC 5.20 4. 10 - 5.70 M/uL    HGB 14.9 12.1 - 17.0 g/dL    HCT 46.2 36.6 - 50.3 %    MCV 88.8 80.0 - 99.0 FL    MCH 28.7 26.0 - 34.0 PG    MCHC 32.3 30.0 - 36.5 g/dL    RDW 12.8 11.5 - 14.5 %    PLATELET 073 363 - 838 K/uL    MPV 11.5 8.9 - 12.9 FL    NRBC 0.0 0  WBC    ABSOLUTE NRBC 0.00 0.00 - 0.01 K/uL    NEUTROPHILS 71 32 - 75 %    LYMPHOCYTES 15 12 - 49 %    MONOCYTES 13 5 - 13 %    EOSINOPHILS 1 0 - 7 %    BASOPHILS 0 0 - 1 %    IMMATURE GRANULOCYTES 0 0.0 - 0.5 %    ABS. NEUTROPHILS 5.3 1.8 - 8.0 K/UL    ABS. LYMPHOCYTES 1.2 0.8 - 3.5 K/UL    ABS. MONOCYTES 1.0 0.0 - 1.0 K/UL    ABS. EOSINOPHILS 0.1 0.0 - 0.4 K/UL    ABS. BASOPHILS 0.0 0.0 - 0.1 K/UL    ABS. IMM. GRANS. 0.0 0.00 - 0.04 K/UL    DF AUTOMATED     METABOLIC PANEL, COMPREHENSIVE    Collection Time: 05/02/21  8:20 AM   Result Value Ref Range    Sodium 140 136 - 145 mmol/L    Potassium 3.7 3.5 - 5.1 mmol/L    Chloride 107 97 - 108 mmol/L    CO2 26 21 - 32 mmol/L    Anion gap 7 5 - 15 mmol/L    Glucose 87 65 - 100 mg/dL    BUN 58 (H) 6 - 20 MG/DL    Creatinine 1.35 (H) 0.70 - 1.30 MG/DL    BUN/Creatinine ratio 43 (H) 12 - 20      GFR est AA >60 >60 ml/min/1.73m2    GFR est non-AA 56 (L) >60 ml/min/1.73m2    Calcium 8.5 8.5 - 10.1 MG/DL    Bilirubin, total 0.9 0.2 - 1.0 MG/DL    ALT (SGPT) 180 (H) 12 - 78 U/L    AST (SGOT) 34 15 - 37 U/L    Alk.  phosphatase 88 45 - 117 U/L Protein, total 6.4 6.4 - 8.2 g/dL    Albumin 3.0 (L) 3.5 - 5.0 g/dL    Globulin 3.4 2.0 - 4.0 g/dL    A-G Ratio 0.9 (L) 1.1 - 2.2          Imaging:    No results found. Assessment and Plan:     Acute respiratory failure  -Acute hypoxic respiratory failure due to COVID-19 pneumonia  -Patient currently requiring 20 L of oxygen, wean as able  -Pulmonology following    COVID-19 pneumonia  -Chest x-ray with appearance of bilateral infiltrates  -Patient has received 1 dose of Actemra and completed remdesivir  -Solu-Medrol was switched to prednisone, lasix on hold  -Pulmonology following    Abnormal LFTs  -Likely from viral infection, patient asymptomatic  -Repeat LFTs in a.m. Hypertension  -Continue losartan and HCTZ    Depression  -On Celexa    Dyslipidemia  -On statin    Obstructive sleep apnea  -On CPAP    Morbid obesity  -With BMI of 51.68  -Outpatient follow-up for weight management    Discharge disposition: Likely more than 48 hours as patient is still unstable with requirement for high flow oxygen.     Signed By: Marlyn Castillo MD     May 2, 2021

## 2021-05-03 LAB
ANION GAP SERPL CALC-SCNC: 6 MMOL/L (ref 5–15)
BUN SERPL-MCNC: 48 MG/DL (ref 6–20)
BUN/CREAT SERPL: 36 (ref 12–20)
CALCIUM SERPL-MCNC: 8.3 MG/DL (ref 8.5–10.1)
CHLORIDE SERPL-SCNC: 107 MMOL/L (ref 97–108)
CO2 SERPL-SCNC: 28 MMOL/L (ref 21–32)
CREAT SERPL-MCNC: 1.34 MG/DL (ref 0.7–1.3)
D DIMER PPP FEU-MCNC: 9.51 MG/L FEU (ref 0–0.65)
GLUCOSE BLD STRIP.AUTO-MCNC: 120 MG/DL (ref 65–100)
GLUCOSE BLD STRIP.AUTO-MCNC: 124 MG/DL (ref 65–100)
GLUCOSE BLD STRIP.AUTO-MCNC: 77 MG/DL (ref 65–100)
GLUCOSE BLD STRIP.AUTO-MCNC: 84 MG/DL (ref 65–100)
GLUCOSE SERPL-MCNC: 82 MG/DL (ref 65–100)
POTASSIUM SERPL-SCNC: 3.8 MMOL/L (ref 3.5–5.1)
SERVICE CMNT-IMP: ABNORMAL
SERVICE CMNT-IMP: ABNORMAL
SERVICE CMNT-IMP: NORMAL
SERVICE CMNT-IMP: NORMAL
SODIUM SERPL-SCNC: 141 MMOL/L (ref 136–145)

## 2021-05-03 PROCEDURE — 94760 N-INVAS EAR/PLS OXIMETRY 1: CPT

## 2021-05-03 PROCEDURE — 77010033678 HC OXYGEN DAILY

## 2021-05-03 PROCEDURE — 82962 GLUCOSE BLOOD TEST: CPT

## 2021-05-03 PROCEDURE — 65660000001 HC RM ICU INTERMED STEPDOWN

## 2021-05-03 PROCEDURE — 80048 BASIC METABOLIC PNL TOTAL CA: CPT

## 2021-05-03 PROCEDURE — 94640 AIRWAY INHALATION TREATMENT: CPT

## 2021-05-03 PROCEDURE — 74011636637 HC RX REV CODE- 636/637: Performed by: PHYSICIAN ASSISTANT

## 2021-05-03 PROCEDURE — 85379 FIBRIN DEGRADATION QUANT: CPT

## 2021-05-03 PROCEDURE — 74011250637 HC RX REV CODE- 250/637: Performed by: FAMILY MEDICINE

## 2021-05-03 PROCEDURE — 77010033711 HC HIGH FLOW OXYGEN

## 2021-05-03 PROCEDURE — 36415 COLL VENOUS BLD VENIPUNCTURE: CPT

## 2021-05-03 PROCEDURE — 74011250636 HC RX REV CODE- 250/636: Performed by: NURSE PRACTITIONER

## 2021-05-03 PROCEDURE — 74011250637 HC RX REV CODE- 250/637: Performed by: INTERNAL MEDICINE

## 2021-05-03 PROCEDURE — 94762 N-INVAS EAR/PLS OXIMTRY CONT: CPT

## 2021-05-03 PROCEDURE — 74011250637 HC RX REV CODE- 250/637: Performed by: NURSE PRACTITIONER

## 2021-05-03 PROCEDURE — 74011250637 HC RX REV CODE- 250/637: Performed by: HOSPITALIST

## 2021-05-03 RX ADMIN — OXYCODONE HYDROCHLORIDE AND ACETAMINOPHEN 500 MG: 500 TABLET ORAL at 18:00

## 2021-05-03 RX ADMIN — ALBUTEROL SULFATE 2 PUFF: 108 AEROSOL, METERED RESPIRATORY (INHALATION) at 08:00

## 2021-05-03 RX ADMIN — ATORVASTATIN CALCIUM 20 MG: 20 TABLET, FILM COATED ORAL at 09:00

## 2021-05-03 RX ADMIN — PREDNISONE 40 MG: 20 TABLET ORAL at 08:00

## 2021-05-03 RX ADMIN — Medication 1 CAPSULE: at 09:00

## 2021-05-03 RX ADMIN — HYDROCHLOROTHIAZIDE: 25 TABLET ORAL at 09:00

## 2021-05-03 RX ADMIN — ALBUTEROL SULFATE 2 PUFF: 108 AEROSOL, METERED RESPIRATORY (INHALATION) at 14:00

## 2021-05-03 RX ADMIN — Medication 10 ML: at 14:00

## 2021-05-03 RX ADMIN — Medication 2000 UNITS: at 09:00

## 2021-05-03 RX ADMIN — CITALOPRAM HYDROBROMIDE 40 MG: 20 TABLET ORAL at 09:00

## 2021-05-03 RX ADMIN — ENOXAPARIN SODIUM 40 MG: 100 INJECTION SUBCUTANEOUS at 04:00

## 2021-05-03 RX ADMIN — OXYCODONE HYDROCHLORIDE AND ACETAMINOPHEN 500 MG: 500 TABLET ORAL at 09:00

## 2021-05-03 RX ADMIN — POLYETHYLENE GLYCOL 3350 17 G: 17 POWDER, FOR SOLUTION ORAL at 09:00

## 2021-05-03 RX ADMIN — ENOXAPARIN SODIUM 40 MG: 100 INJECTION SUBCUTANEOUS at 16:00

## 2021-05-03 RX ADMIN — Medication 10 ML: at 06:00

## 2021-05-03 RX ADMIN — ALBUTEROL SULFATE 2 PUFF: 108 AEROSOL, METERED RESPIRATORY (INHALATION) at 19:33

## 2021-05-03 NOTE — PROGRESS NOTES
Bedside shift change report given to Terrell Alcantara RN (oncoming nurse) by Yodit Martinez RN (offgoing nurse).  Report included the following information SBAR, Intake/Output, MAR, Recent Results and Cardiac Rhythm SR.

## 2021-05-03 NOTE — PROGRESS NOTES
[facility] [facility] Adult  Hospitalist Group                                                                                          Hospitalist Progress Note  Shikha Barrett MD  Answering service: [phone] OR [phone] from in house phone              Progress Note    Patient: Buddy Russell MRN: [id number]  SSN: [id number]    YOB: 1970  Age: 48 y.o. Sex: male      Admit Date: 4/20/2021    LOS: 13 days     Subjective:     Patient presents with acute hypoxic respiratory failure due to COVID-19 pneumonia. Patient is currently requiring 15 L of oxygen. Otherwise no acute complaint. Denies any chest pain, shortness of breath, dizziness, headache, nausea, vomiting or abdominal pain. Objective:     Vitals:    05/02/21 2326 05/03/21 0438 05/03/21 0804 05/03/21 0901   BP: (!) 156/83 (!) 154/71  (!) 166/66   Pulse: 88 78  100   Resp: 16 18  13   Temp: 98.6 °F (37 °C) 97.8 °F (36.6 °C)  98.3 °F (36.8 °C)   SpO2: 99% 96% 93% 95%   Weight:  149.9 kg (330 lb 8 oz)     Height:            Intake and Output:  Current Shift: No intake/output data recorded. Last three shifts: 05/01 1901 - 05/03 0700  In: 220 [P.O.:220]  Out: 2125 [Urine:2125]    Physical Exam:   GENERAL: alert, cooperative  THROAT & NECK: normal and no erythema or exudates noted. LUNG: clear to auscultation bilaterally  HEART: regular rate and rhythm, S1, S2 normal, no murmur, click, rub or gallop  ABDOMEN: soft, non-tender. Bowel sounds normal. No masses,  no organomegaly  EXTREMITIES:  extremities normal, atraumatic, no cyanosis or edema  SKIN: no rash or abnormalities  NEUROLOGIC: AOx3. PSYCHIATRIC: non focal    Lab/Data Review: All lab results for the last 24 hours reviewed.      Recent Results (from the past 24 hour(s))   GLUCOSE, POC    Collection Time: 05/02/21 11:20 AM   Result Value Ref Range    Glucose (POC) 135 (H) 65 - 100 mg/dL    Performed by "[employer] [facility], POC    Collection Time: 05/02/21  5:01 PM Result Value Ref Range    Glucose (POC) 108 (H) 65 - 100 mg/dL    Performed by Liliane Rhoades    GLUCOSE, POC    Collection Time: 05/02/21 10:02 PM   Result Value Ref Range    Glucose (POC) 126 (H) 65 - 100 mg/dL    Performed by Benji Fishman (CON)    D DIMER    Collection Time: 05/03/21  4:55 AM   Result Value Ref Range    D-dimer 9.51 (H) 0.00 - 0.65 mg/L FEU   METABOLIC PANEL, BASIC    Collection Time: 05/03/21  4:55 AM   Result Value Ref Range    Sodium 141 136 - 145 mmol/L    Potassium 3.8 3.5 - 5.1 mmol/L    Chloride 107 97 - 108 mmol/L    CO2 28 21 - 32 mmol/L    Anion gap 6 5 - 15 mmol/L    Glucose 82 65 - 100 mg/dL    BUN 48 (H) 6 - 20 MG/DL    Creatinine 1.34 (H) 0.70 - 1.30 MG/DL    BUN/Creatinine ratio 36 (H) 12 - 20      GFR est AA >60 >60 ml/min/1.73m2    GFR est non-AA 56 (L) >60 ml/min/1.73m2    Calcium 8.3 (L) 8.5 - 10.1 MG/DL   GLUCOSE, POC    Collection Time: 05/03/21  8:17 AM   Result Value Ref Range    Glucose (POC) 77 65 - 100 mg/dL    Performed by Teagan Vera         Imaging:    No results found. Assessment and Plan:     Acute respiratory failure  -Acute hypoxic respiratory failure due to COVID-19 pneumonia  -Patient currently requiring 15 L of oxygen, weanning  -Pulmonology following    COVID-19 pneumonia  -Chest x-ray with appearance of bilateral infiltrates  -Patient has received 1 dose of Actemra and completed remdesivir  -Solu-Medrol was switched to prednisone, lasix on hold  -Pulmonology following    Abnormal LFTs  -Likely from viral infection, patient asymptomatic  -Repeat LFTs in a.m. Hypertension  -Continue losartan and HCTZ    Depression  -On Celexa    Dyslipidemia  -On statin    Obstructive sleep apnea  -On CPAP    Morbid obesity  -With BMI of 51.68  -Outpatient follow-up for weight management    Discharge disposition: Likely more than 48 hours as patient is still unstable with requirement for high flow oxygen.     Signed By: Javier Mcguire MD     May 3, 2021

## 2021-05-03 NOTE — PROGRESS NOTES
PCCM:    VSS, on HFNC, O2 requirements are trending down    D dimer 9.51    Plan:   COVID19   Though O2 requirements are trending down, d dimer jumped.     Discussed with hospitalist. Consider CTA     Colleen Zuniga PA-C

## 2021-05-04 ENCOUNTER — APPOINTMENT (OUTPATIENT)
Dept: CT IMAGING | Age: 51
DRG: 177 | End: 2021-05-04
Attending: FAMILY MEDICINE
Payer: COMMERCIAL

## 2021-05-04 LAB
ANION GAP SERPL CALC-SCNC: 7 MMOL/L (ref 5–15)
BUN SERPL-MCNC: 38 MG/DL (ref 6–20)
BUN/CREAT SERPL: 35 (ref 12–20)
CALCIUM SERPL-MCNC: 9.1 MG/DL (ref 8.5–10.1)
CHLORIDE SERPL-SCNC: 104 MMOL/L (ref 97–108)
CO2 SERPL-SCNC: 27 MMOL/L (ref 21–32)
CREAT SERPL-MCNC: 1.1 MG/DL (ref 0.7–1.3)
D DIMER PPP FEU-MCNC: 0.56 MG/L FEU (ref 0–0.65)
GLUCOSE BLD STRIP.AUTO-MCNC: 100 MG/DL (ref 65–100)
GLUCOSE BLD STRIP.AUTO-MCNC: 145 MG/DL (ref 65–100)
GLUCOSE BLD STRIP.AUTO-MCNC: 197 MG/DL (ref 65–100)
GLUCOSE BLD STRIP.AUTO-MCNC: 94 MG/DL (ref 65–100)
GLUCOSE SERPL-MCNC: 103 MG/DL (ref 65–100)
POTASSIUM SERPL-SCNC: 3.8 MMOL/L (ref 3.5–5.1)
SERVICE CMNT-IMP: ABNORMAL
SERVICE CMNT-IMP: ABNORMAL
SERVICE CMNT-IMP: NORMAL
SERVICE CMNT-IMP: NORMAL
SODIUM SERPL-SCNC: 138 MMOL/L (ref 136–145)

## 2021-05-04 PROCEDURE — 74011250636 HC RX REV CODE- 250/636: Performed by: NURSE PRACTITIONER

## 2021-05-04 PROCEDURE — 74011250637 HC RX REV CODE- 250/637: Performed by: HOSPITALIST

## 2021-05-04 PROCEDURE — 74011636637 HC RX REV CODE- 636/637: Performed by: PHYSICIAN ASSISTANT

## 2021-05-04 PROCEDURE — 82962 GLUCOSE BLOOD TEST: CPT

## 2021-05-04 PROCEDURE — 74011250637 HC RX REV CODE- 250/637: Performed by: FAMILY MEDICINE

## 2021-05-04 PROCEDURE — 36415 COLL VENOUS BLD VENIPUNCTURE: CPT

## 2021-05-04 PROCEDURE — 80048 BASIC METABOLIC PNL TOTAL CA: CPT

## 2021-05-04 PROCEDURE — 94660 CPAP INITIATION&MGMT: CPT

## 2021-05-04 PROCEDURE — 74011250637 HC RX REV CODE- 250/637: Performed by: INTERNAL MEDICINE

## 2021-05-04 PROCEDURE — 85379 FIBRIN DEGRADATION QUANT: CPT

## 2021-05-04 PROCEDURE — 65660000001 HC RM ICU INTERMED STEPDOWN

## 2021-05-04 PROCEDURE — 77010033711 HC HIGH FLOW OXYGEN

## 2021-05-04 PROCEDURE — 94640 AIRWAY INHALATION TREATMENT: CPT

## 2021-05-04 PROCEDURE — 74011636637 HC RX REV CODE- 636/637: Performed by: HOSPITALIST

## 2021-05-04 PROCEDURE — 74011250637 HC RX REV CODE- 250/637: Performed by: NURSE PRACTITIONER

## 2021-05-04 RX ADMIN — ATORVASTATIN CALCIUM 20 MG: 20 TABLET, FILM COATED ORAL at 09:00

## 2021-05-04 RX ADMIN — ENOXAPARIN SODIUM 40 MG: 100 INJECTION SUBCUTANEOUS at 16:00

## 2021-05-04 RX ADMIN — OXYCODONE HYDROCHLORIDE AND ACETAMINOPHEN 500 MG: 500 TABLET ORAL at 09:00

## 2021-05-04 RX ADMIN — PREDNISONE 40 MG: 20 TABLET ORAL at 08:00

## 2021-05-04 RX ADMIN — ALBUTEROL SULFATE 2 PUFF: 108 AEROSOL, METERED RESPIRATORY (INHALATION) at 08:00

## 2021-05-04 RX ADMIN — ENOXAPARIN SODIUM 40 MG: 100 INJECTION SUBCUTANEOUS at 04:00

## 2021-05-04 RX ADMIN — POLYETHYLENE GLYCOL 3350 17 G: 17 POWDER, FOR SOLUTION ORAL at 09:00

## 2021-05-04 RX ADMIN — HYDROCHLOROTHIAZIDE: 25 TABLET ORAL at 09:00

## 2021-05-04 RX ADMIN — OXYCODONE HYDROCHLORIDE AND ACETAMINOPHEN 500 MG: 500 TABLET ORAL at 18:00

## 2021-05-04 RX ADMIN — Medication 1 CAPSULE: at 09:00

## 2021-05-04 RX ADMIN — INSULIN LISPRO 2 UNITS: 100 INJECTION, SOLUTION INTRAVENOUS; SUBCUTANEOUS at 16:30

## 2021-05-04 RX ADMIN — CITALOPRAM HYDROBROMIDE 40 MG: 20 TABLET ORAL at 09:00

## 2021-05-04 RX ADMIN — Medication 2000 UNITS: at 09:00

## 2021-05-04 NOTE — PROGRESS NOTES
Spiritual Care Assessment/Progress Note  [facility]      NAME: Buddy Russell      MRN: [id number]  AGE: 48 y.o. SEX: male  Denominational Affiliation:    Language: English     5/4/2021     Total Time (in minutes): 7     Spiritual Assessment begun in [address] [facility] [address] through conversation with:         []Patient        [] Family    [] Friend(s)        Reason for Consult: Initial/Spiritual assessment, patient floor     Spiritual beliefs: (Please include comment if needed)     [] Identifies with a terence tradition:         [] Supported by a terence community:            [] Claims no spiritual orientation:           [] Seeking spiritual identity:                [] Adheres to an individual form of spirituality:           [x] Not able to assess:                           Identified resources for coping:      [] Prayer                               [] Music                  [] Guided Imagery     [] Family/friends                 [] Pet visits     [] Devotional reading                         [x] Unknown     [] Other:                                               Interventions offered during this visit: (See comments for more details)                Plan of Care:     [] Support spiritual and/or cultural needs    [] Support AMD and/or advance care planning process      [] Support grieving process   [] Coordinate Rites and/or Rituals    [] Coordination with community clergy   [] No spiritual needs identified at this time   [] Detailed Plan of Care below (See Comments)  [] Make referral to Music Therapy  [] Make referral to Pet Therapy     [] Make referral to Addiction services  [] Make referral to [facility]  [] Make referral to Spiritual Care Partner  [] No future visits requested        [x] Follow up upon further referrals     Comments:  visit for initial spiritual assessment. Patient on Covid-19 isolation precautions. Attempted to contact patient via telephone, but no answer. Spoke to staff, patient resting. Assume patient resting and did not wish to be disturbed at this time. Please contact spiritual care for further referral or consult. Rev.  Tanner Zhang MDiv, [facility], [address]   paging service: [phone])

## 2021-05-04 NOTE — PROGRESS NOTES
Transitions of Care: 13% risk of re-admission      -Anticipate discharge home, family involved- [facility] accepted, monitor home Oxygen needs   -PCP follow-up     Patient remains on 10L of Oxygen, not stable for discharge- family involved, TBD if patient will d/c to his own home vs. [facility]- will need to monitor for home health purposes. Family is inquiring about quarantine needed at discharge, etc.- MD to call daughter Vesna Torres. The CM will follow for transitions of care needs.      JAC Gu

## 2021-05-04 NOTE — PROGRESS NOTES
Name: [facility]: [address]   : 1970 Admit Date: 2021   Phone: [phone]  Room: [facility]   PCP: None  MRN: [id number]   Date: 2021  Code: Full Code        HPI:      On HFNC now; slow wean in progress  Feeling better   D dimer was 0.56 today; suspect that the d dimer yesterday was lab error  Cr improving       Upon entry saturations were upper 80s and low 90s on 25lpm and 54% Fio2; I reduced flow to 20lpm and increased FiO2 to 94% and saturations went up to mid 90s and stabilized. Please continue wean Flow.   I reduced to 20lpm and 95% FIO2  Sitting up   Feels great      Down to 25lpm and 95% FiO2  CRP 0.42      Down to 30 lpm and 90% Fio2  Feeling better  D dimer 0.61      Walked in to room. Patient was 84% sats on 40lpm and 64% Fio2. I increased to 45lpm and 92% FiO2 and saturations went above 90%   Was about to work with PT  D dimer 0.61  CRP 0.83      Going back and forth with NIV and high flow 60lpm and 90% Fio2  D dimer 0.55  Ferritin   CRP 4.53  probnp 44      CRP 9.33  Now on NIV/high flow  Could not receive remdesivir due to hospital policy   Notable desaturations last night and this am  Feels ok right now  Dicussed with RN       CRP 17.8  Still on 6lpm  Feels about the same   Day 7 of dx       5:44 PM       History was obtained from patient. I was asked by Heriberto Holguin MD to see Mei Diaz in consultation for a chief complaint of shortness of breath. History of Present Illness: 48year old male with past medical history of asthma, allergic rhinitis and morbid obesity who presented to [facility] with increased shortness of breath. Diagnosed with COVID-19 1 week back at [facility]. Last fever 7 days back. Does have cough, shortness of breath and wheezing. On prednisone 20 mg q daily. Data:  Hgb 6.8  Wbc 14  Plt 202  D-dimer 0.79  Cr 1.41  Trop < 0.05  nt pro bnp 25  CRP pending.   CXR - bilateral infiltrates. Past Medical History:   Diagnosis Date    Asthma     High cholesterol     Hypertension     Sleep apnea        Past Surgical History:   Procedure Laterality Date    HX HERNIA REPAIR      HX OPEN REDUCTION INTERNAL FIXATION      rt leg       History reviewed. No pertinent family history.     Social History     Tobacco Use    Smoking status: Never Smoker    Smokeless tobacco: Current User   Substance Use Topics    Alcohol use: Never     Frequency: Never       Allergies   Allergen Reactions    Penicillins Unknown (comments)       Current Facility-Administered Medications   Medication Dose Route Frequency    iopamidoL (ISOVUE-370) 76 % injection 100 mL  100 mL IntraVENous RAD ONCE    albuterol (PROVENTIL HFA, VENTOLIN HFA, PROAIR HFA) inhaler 2 Puff  2 Puff Inhalation TID RT    polyethylene glycol (MIRALAX) packet 17 g  17 g Oral DAILY    predniSONE (DELTASONE) tablet 40 mg  40 mg Oral DAILY WITH BREAKFAST    atorvastatin (LIPITOR) tablet 20 mg  20 mg Oral DAILY    [Held by provider] furosemide (LASIX) injection 40 mg  40 mg IntraVENous DAILY    losartan/hydroCHLOROthiazide (HYZAAR) 100/25 mg   Oral DAILY    insulin lispro (HUMALOG) injection   SubCUTAneous AC&HS    glucose chewable tablet 16 g  4 Tab Oral PRN    dextrose (D50W) injection syrg 12.5-25 g  12.5-25 g IntraVENous PRN    glucagon (GLUCAGEN) injection 1 mg  1 mg IntraMUSCular PRN    citalopram (CELEXA) tablet 40 mg  40 mg Oral DAILY    enoxaparin (LOVENOX) injection 40 mg  40 mg SubCUTAneous Q12H    acetaminophen (TYLENOL) tablet 650 mg  650 mg Oral Q6H PRN    Or    acetaminophen (TYLENOL) suppository 650 mg  650 mg Rectal Q6H PRN    guaiFENesin-dextromethorphan (ROBITUSSIN DM) 100-10 mg/5 mL syrup 5 mL  5 mL Oral Q4H PRN    cholecalciferol (VITAMIN D3) (1000 Units /25 mcg) tablet 2,000 Units  2,000 Units Oral DAILY    ascorbic acid (vitamin C) (VITAMIN C) tablet 500 mg  500 mg Oral BID    zinc sulfate (ZINCATE) 50 mg zinc (220 mg) capsule 1 Cap  1 Cap Oral DAILY    sodium chloride (NS) flush 5-40 mL  5-40 mL IntraVENous Q8H    sodium chloride (NS) flush 5-40 mL  5-40 mL IntraVENous PRN         REVIEW OF SYSTEMS   Negative except as stated in the HPI. Physical Exam:   Visit Vitals  BP (!) 166/92 (BP 1 Location: Right lower arm, BP Patient Position: At rest)   Pulse 78   Temp 97.7 °F (36.5 °C)   Resp 18   Ht 5' 10\" (1.778 m)   Wt 148.4 kg (327 lb 3.2 oz)   SpO2 96%   BMI 46.95 kg/m²     Patient has COVID-19 infection and examination was deferred. Only inspection was performed. General:  Alert, cooperative, no distress. Head:  Normocephalic. Eyes:  Conjunctivae/corneas clear. Nose: Nares normal. Septum midline. Extremities: Extremities normal, atraumatic, no cyanosis or edema. Skin: Skin color, texture, turgor normal. No rashes or lesions       Neurologic: Grossly nonfocal       Lab Results   Component Value Date/Time    Sodium 138 05/04/2021 03:26 AM    Potassium 3.8 05/04/2021 03:26 AM    Chloride 104 05/04/2021 03:26 AM    CO2 27 05/04/2021 03:26 AM    BUN 38 (H) 05/04/2021 03:26 AM    Creatinine 1.10 05/04/2021 03:26 AM    Glucose 103 (H) 05/04/2021 03:26 AM    Calcium 9.1 05/04/2021 03:26 AM    Magnesium 2.9 (H) 04/20/2021 07:12 PM    Lactic acid 0.9 04/20/2021 07:12 PM       Lab Results   Component Value Date/Time    WBC 7.6 05/02/2021 08:20 AM    HGB 14.9 05/02/2021 08:20 AM    PLATELET 126 52/82/0482 08:20 AM    MCV 88.8 05/02/2021 08:20 AM       Lab Results   Component Value Date/Time    INR 1.0 04/20/2021 07:12 PM    aPTT 31.7 (H) 04/20/2021 07:12 PM    Alk.  phosphatase 88 05/02/2021 08:20 AM    Protein, total 6.4 05/02/2021 08:20 AM    Albumin 3.0 (L) 05/02/2021 08:20 AM    Globulin 3.4 05/02/2021 08:20 AM       No results found for: PH, PHI, PCO2, PCO2I, PO2, PO2I, HCO3, HCO3I, FIO2, FIO2I    Lab Results   Component Value Date/Time    Troponin-I, Qt. <0.05 04/20/2021 07:12 PM        IMPRESSION:  ===========  -COVID-19.  -COVID-19 pneumonia. O + blood type. Vit D deficient   -hypoxemic respiratory failure. Worsening, suspected ARDS, improving   -asthma.  -seasonal allergies.  -Obesity. PLAN:  =====  -PO prednisone; wean over next 7 days  -lovenox bid dosing   -s/p actemra   -diuresis, noted on hold   -O2 supplementation above 90%, NIV qhs; on high flow. Notable desaturations with ambulation, but at rest in the mid 90s. Suspect body habitus is prolonging need to Higher flow rates. Continue to wean   -sleep prone > 8 hours.   -follow up in 7 days telemedicine visit  -may need inpatient rehab  -we will be available again to see if needed       Khurram Pro PA-C

## 2021-05-04 NOTE — ROUTINE PROCESS
Bedside and Verbal shift change report given to Zihlman (oncoming nurse) by Dearl Diver (offgoing nurse). Report included the following information SBAR, Kardex, ED Summary, Intake/Output, MAR, Recent Results and Cardiac Rhythm NSR.

## 2021-05-04 NOTE — ROUTINE PROCESS
0020: Spoke with CT and informed them to put in transport when they are ready for the patient, this RN was available to go down with patient on the monitor. 7750: Received called from CT that they are ready for the patient, no assistance for transport at this time. Per CT they can do the CTA later this morning.

## 2021-05-04 NOTE — PROGRESS NOTES
Bedside shift change report given to [address] (oncoming nurse) by Kayla Horn RN (offgoing nurse). Report included the following information SBAR, Intake/Output, MAR, Med Rec Status and Cardiac Rhythm nsr.

## 2021-05-04 NOTE — PROGRESS NOTES
[facility]  Hospitalist Group                                                                                          Hospitalist Progress Note  Axel Alcantara MD  Answering service: [phone] OR [phone] from in house phone              Progress Note    Patient: Cammie Iverson MRN: [id number]  SSN: [id number]    YOB: 1970  Age: 48 y.o. Sex: male      Admit Date: 4/20/2021    LOS: 14 days     Subjective:     Patient presents with acute hypoxic respiratory failure due to COVID-19 pneumonia. Patient is currently requiring 10 L of oxygen. Otherwise no acute complaint. Denies any chest pain, shortness of breath, dizziness, headache, nausea, vomiting or abdominal pain. Objective:     Vitals:    05/04/21 0807 05/04/21 0808 05/04/21 0843 05/04/21 1238   BP:   (!) 166/92 (!) 157/97   Pulse:   78 89   Resp:   18 18   Temp:   97.7 °F (36.5 °C) 97.3 °F (36.3 °C)   SpO2: 98% 98% 96% 90%   Weight:       Height:            Intake and Output:  Current Shift: No intake/output data recorded. Last three shifts: 05/02 1901 - 05/04 0700  In: 820 [P.O.:820]  Out: 1150 [Urine:1150]    Physical Exam:   GENERAL: alert, cooperative  THROAT & NECK: normal and no erythema or exudates noted. LUNG: clear to auscultation bilaterally  HEART: regular rate and rhythm, S1, S2 normal, no murmur, click, rub or gallop  ABDOMEN: soft, non-tender. Bowel sounds normal. No masses,  no organomegaly  EXTREMITIES:  extremities normal, atraumatic, no cyanosis or edema  SKIN: no rash or abnormalities  NEUROLOGIC: AOx3. PSYCHIATRIC: non focal    Lab/Data Review: All lab results for the last 24 hours reviewed.      Recent Results (from the past 24 hour(s))   GLUCOSE, POC    Collection Time: 05/03/21  4:14 PM   Result Value Ref Range    Glucose (POC) 124 (H) 65 - 100 mg/dL    Performed by Gabi FUENTES (CON)    GLUCOSE, POC    Collection Time: 05/03/21 10:38 PM   Result Value Ref Range    Glucose (POC) 120 (H) 65 - 100 mg/dL    Performed by Josephine Burnette    D DIMER    Collection Time: 05/04/21  3:26 AM   Result Value Ref Range    D-dimer 0.56 0.00 - 0.65 mg/L FEU   METABOLIC PANEL, BASIC    Collection Time: 05/04/21  3:26 AM   Result Value Ref Range    Sodium 138 136 - 145 mmol/L    Potassium 3.8 3.5 - 5.1 mmol/L    Chloride 104 97 - 108 mmol/L    CO2 27 21 - 32 mmol/L    Anion gap 7 5 - 15 mmol/L    Glucose 103 (H) 65 - 100 mg/dL    BUN 38 (H) 6 - 20 MG/DL    Creatinine 1.10 0.70 - 1.30 MG/DL    BUN/Creatinine ratio 35 (H) 12 - 20      GFR est AA >60 >60 ml/min/1.73m2    GFR est non-AA >60 >60 ml/min/1.73m2    Calcium 9.1 8.5 - 10.1 MG/DL   GLUCOSE, POC    Collection Time: 05/04/21  6:34 AM   Result Value Ref Range    Glucose (POC) 94 65 - 100 mg/dL    Performed by [facility] [place], POC    Collection Time: 05/04/21 11:55 AM   Result Value Ref Range    Glucose (POC) 100 65 - 100 mg/dL    Performed by Opez         Imaging:    No results found. Assessment and Plan:     Acute respiratory failure  -Acute hypoxic respiratory failure due to COVID-19 pneumonia  -Patient currently requiring 10 L of oxygen, weanning  -Pulmonology following    COVID-19 pneumonia  -Chest x-ray with appearance of bilateral infiltrates  -Patient has received 1 dose of Actemra and completed remdesivir  -Solu-Medrol was switched to prednisone, lasix on hold  -Pulmonology following    Abnormal LFTs  -Likely from viral infection, patient asymptomatic  -Repeat LFTs in a.m. Hypertension  -Continue losartan and HCTZ    Depression  -On Celexa    Dyslipidemia  -On statin    Obstructive sleep apnea  -On CPAP    Morbid obesity  -With BMI of 51.68  -Outpatient follow-up for weight management    Discharge disposition: Likely more than 48 hours as patient is still unstable with requirement for mid flow oxygen.     Signed By: Linda العراقي MD     May 4, 2021

## 2021-05-05 LAB
ANION GAP SERPL CALC-SCNC: 7 MMOL/L (ref 5–15)
BUN SERPL-MCNC: 36 MG/DL (ref 6–20)
BUN/CREAT SERPL: 36 (ref 12–20)
CALCIUM SERPL-MCNC: 8.9 MG/DL (ref 8.5–10.1)
CHLORIDE SERPL-SCNC: 106 MMOL/L (ref 97–108)
CO2 SERPL-SCNC: 26 MMOL/L (ref 21–32)
CREAT SERPL-MCNC: 0.99 MG/DL (ref 0.7–1.3)
D DIMER PPP FEU-MCNC: 0.39 MG/L FEU (ref 0–0.65)
GLUCOSE BLD STRIP.AUTO-MCNC: 135 MG/DL (ref 65–100)
GLUCOSE BLD STRIP.AUTO-MCNC: 160 MG/DL (ref 65–100)
GLUCOSE BLD STRIP.AUTO-MCNC: 95 MG/DL (ref 65–100)
GLUCOSE BLD STRIP.AUTO-MCNC: 98 MG/DL (ref 65–100)
GLUCOSE SERPL-MCNC: 105 MG/DL (ref 65–100)
POTASSIUM SERPL-SCNC: 3.9 MMOL/L (ref 3.5–5.1)
SERVICE CMNT-IMP: ABNORMAL
SERVICE CMNT-IMP: ABNORMAL
SERVICE CMNT-IMP: NORMAL
SERVICE CMNT-IMP: NORMAL
SODIUM SERPL-SCNC: 139 MMOL/L (ref 136–145)

## 2021-05-05 PROCEDURE — 80048 BASIC METABOLIC PNL TOTAL CA: CPT

## 2021-05-05 PROCEDURE — 74011250637 HC RX REV CODE- 250/637: Performed by: INTERNAL MEDICINE

## 2021-05-05 PROCEDURE — 82962 GLUCOSE BLOOD TEST: CPT

## 2021-05-05 PROCEDURE — 85379 FIBRIN DEGRADATION QUANT: CPT

## 2021-05-05 PROCEDURE — 94640 AIRWAY INHALATION TREATMENT: CPT

## 2021-05-05 PROCEDURE — 65660000001 HC RM ICU INTERMED STEPDOWN

## 2021-05-05 PROCEDURE — 74011250637 HC RX REV CODE- 250/637: Performed by: FAMILY MEDICINE

## 2021-05-05 PROCEDURE — 74011250637 HC RX REV CODE- 250/637: Performed by: HOSPITALIST

## 2021-05-05 PROCEDURE — 36415 COLL VENOUS BLD VENIPUNCTURE: CPT

## 2021-05-05 PROCEDURE — 74011250636 HC RX REV CODE- 250/636: Performed by: NURSE PRACTITIONER

## 2021-05-05 PROCEDURE — 94660 CPAP INITIATION&MGMT: CPT

## 2021-05-05 PROCEDURE — 77010033711 HC HIGH FLOW OXYGEN

## 2021-05-05 PROCEDURE — 5A09357 ASSISTANCE WITH RESPIRATORY VENTILATION, LESS THAN 24 CONSECUTIVE HOURS, CONTINUOUS POSITIVE AIRWAY PRESSURE: ICD-10-PCS | Performed by: INTERNAL MEDICINE

## 2021-05-05 PROCEDURE — 65270000029 HC RM PRIVATE

## 2021-05-05 PROCEDURE — 74011250637 HC RX REV CODE- 250/637: Performed by: NURSE PRACTITIONER

## 2021-05-05 PROCEDURE — 74011636637 HC RX REV CODE- 636/637: Performed by: PHYSICIAN ASSISTANT

## 2021-05-05 RX ADMIN — OXYCODONE HYDROCHLORIDE AND ACETAMINOPHEN 500 MG: 500 TABLET ORAL at 18:00

## 2021-05-05 RX ADMIN — OXYCODONE HYDROCHLORIDE AND ACETAMINOPHEN 500 MG: 500 TABLET ORAL at 09:00

## 2021-05-05 RX ADMIN — ALBUTEROL SULFATE 2 PUFF: 108 AEROSOL, METERED RESPIRATORY (INHALATION) at 08:00

## 2021-05-05 RX ADMIN — CITALOPRAM HYDROBROMIDE 40 MG: 20 TABLET ORAL at 09:00

## 2021-05-05 RX ADMIN — ALBUTEROL SULFATE 2 PUFF: 108 AEROSOL, METERED RESPIRATORY (INHALATION) at 20:00

## 2021-05-05 RX ADMIN — Medication 1 CAPSULE: at 09:00

## 2021-05-05 RX ADMIN — ALBUTEROL SULFATE 2 PUFF: 108 AEROSOL, METERED RESPIRATORY (INHALATION) at 02:39

## 2021-05-05 RX ADMIN — ATORVASTATIN CALCIUM 20 MG: 20 TABLET, FILM COATED ORAL at 09:00

## 2021-05-05 RX ADMIN — POLYETHYLENE GLYCOL 3350 17 G: 17 POWDER, FOR SOLUTION ORAL at 09:00

## 2021-05-05 RX ADMIN — Medication 10 ML: at 22:03

## 2021-05-05 RX ADMIN — HYDROCHLOROTHIAZIDE: 25 TABLET ORAL at 09:00

## 2021-05-05 RX ADMIN — ENOXAPARIN SODIUM 40 MG: 100 INJECTION SUBCUTANEOUS at 04:27

## 2021-05-05 RX ADMIN — Medication 2000 UNITS: at 09:00

## 2021-05-05 RX ADMIN — PREDNISONE 40 MG: 20 TABLET ORAL at 08:00

## 2021-05-05 RX ADMIN — ENOXAPARIN SODIUM 40 MG: 100 INJECTION SUBCUTANEOUS at 16:00

## 2021-05-05 NOTE — PROGRESS NOTES
[facility]  Hospitalist Group                                                                                          Hospitalist Progress Note  Shikha Barrett MD  Answering service: [phone] OR [phone] from in house phone              Progress Note    Patient: Buddy Russell MRN: [id number]  SSN: [id number]    YOB: 1970  Age: 48 y.o. Sex: male      Admit Date: 4/20/2021    LOS: 15 days     Subjective:     Patient presents with acute hypoxic respiratory failure due to COVID-19 pneumonia. Patient is currently requiring 5 L of oxygen. Otherwise no acute complaint. Denies any chest pain, shortness of breath, dizziness, headache, nausea, vomiting or abdominal pain. Objective:     Vitals:    05/05/21 0235 05/05/21 0427 05/05/21 0827 05/05/21 0943   BP:  (!) 152/89  (!) 180/87   Pulse:  70  85   Resp:  16  18   Temp:  98.2 °F (36.8 °C)     SpO2: 99% 98% 100% 100%   Weight:       Height:            Intake and Output:  Current Shift: 05/05 0701 - 05/05 1900  In: -   Out: 650 [Urine:650]  Last three shifts: 05/03 1901 - 05/05 0700  In: 600 [P.O.:600]  Out: 600 [Urine:600]    Physical Exam:   GENERAL: alert, cooperative  THROAT & NECK: normal and no erythema or exudates noted. LUNG: clear to auscultation bilaterally  HEART: regular rate and rhythm, S1, S2 normal, no murmur, click, rub or gallop  ABDOMEN: soft, non-tender. Bowel sounds normal. No masses,  no organomegaly  EXTREMITIES:  extremities normal, atraumatic, no cyanosis or edema  SKIN: no rash or abnormalities  NEUROLOGIC: AOx3. PSYCHIATRIC: non focal    Lab/Data Review: All lab results for the last 24 hours reviewed.      Recent Results (from the past 24 hour(s))   GLUCOSE, POC    Collection Time: 05/04/21  4:11 PM   Result Value Ref Range    Glucose (POC) 145 (H) 65 - 100 mg/dL    Performed by Jessika House, POC    Collection Time: 05/04/21  9:08 PM   Result Value Ref Range    Glucose (POC) 197 (H) 65 - 100 mg/dL    Performed by Lloyd Tatum    D DIMER    Collection Time: 05/05/21  4:32 AM   Result Value Ref Range    D-dimer 0.39 0.00 - 0.65 mg/L FEU   METABOLIC PANEL, BASIC    Collection Time: 05/05/21  4:32 AM   Result Value Ref Range    Sodium 139 136 - 145 mmol/L    Potassium 3.9 3.5 - 5.1 mmol/L    Chloride 106 97 - 108 mmol/L    CO2 26 21 - 32 mmol/L    Anion gap 7 5 - 15 mmol/L    Glucose 105 (H) 65 - 100 mg/dL    BUN 36 (H) 6 - 20 MG/DL    Creatinine 0.99 0.70 - 1.30 MG/DL    BUN/Creatinine ratio 36 (H) 12 - 20      GFR est AA >60 >60 ml/min/1.73m2    GFR est non-AA >60 >60 ml/min/1.73m2    Calcium 8.9 8.5 - 10.1 MG/DL   GLUCOSE, POC    Collection Time: 05/05/21  6:52 AM   Result Value Ref Range    Glucose (POC) 95 65 - 100 mg/dL    Performed by [address] [place] [address], POC    Collection Time: 05/05/21 12:07 PM   Result Value Ref Range    Glucose (POC) 98 65 - 100 mg/dL    Performed by [facility]         Imaging:    No results found. Assessment and Plan:     Acute respiratory failure  -Acute hypoxic respiratory failure due to COVID-19 pneumonia  -Patient currently requiring 5 L of oxygen, weanning  -Pulmonology following    COVID-19 pneumonia  -Chest x-ray with appearance of bilateral infiltrates  -Patient has received 1 dose of Actemra and completed remdesivir  -Solu-Medrol was switched to prednisone, lasix on hold  -Pulmonology following    Abnormal LFTs  -Likely from viral infection, patient asymptomatic    Hypertension  -Continue losartan and HCTZ    Depression  -On Celexa    Dyslipidemia  -On statin    Obstructive sleep apnea  -On CPAP    Morbid obesity  -With BMI of 51.68  -Outpatient follow-up for weight management    Discharge disposition: Likely in 24-48 hours, once oxygen can be weaned off.     Signed By: Tank Oshea MD     May 5, 2021

## 2021-05-05 NOTE — PROGRESS NOTES
Problem: Risk for Spread of Infection  Goal: Prevent transmission of infectious organism to others  Description: Prevent the transmission of infectious organisms to other patients, staff members, and visitors. Outcome: Progressing Towards Goal     Problem: Patient Education:  Go to Education Activity  Goal: Patient/Family Education  Outcome: Progressing Towards Goal     Problem: Patient Education: Go to Patient Education Activity  Goal: Patient/Family Education  Outcome: Progressing Towards Goal     Problem: Patient Education: Go to Patient Education Activity  Goal: Patient/Family Education  Outcome: Progressing Towards Goal     Problem: Falls - Risk of  Goal: *Absence of Falls  Description: Document Jade Leon Fall Risk and appropriate interventions in the flowsheet. Outcome: Progressing Towards Goal  Note: Fall Risk Interventions:            Medication Interventions: Patient to call before getting OOB, Teach patient to arise slowly         History of Falls Interventions: Evaluate medications/consider consulting pharmacy         Problem: Patient Education: Go to Patient Education Activity  Goal: Patient/Family Education  Outcome: Progressing Towards Goal     Problem: Gas Exchange - Impaired  Goal: Absence of hypoxia  Outcome: Progressing Towards Goal  Goal: Promote optimal lung function  Outcome: Progressing Towards Goal     Problem: Breathing Pattern - Ineffective  Goal: Ability to achieve and maintain a regular respiratory rate  Outcome: Progressing Towards Goal     Problem:  Body Temperature -  Risk of, Imbalanced  Goal: Ability to maintain a body temperature within defined limits  Outcome: Progressing Towards Goal  Goal: Will regain or maintain usual level of consciousness  Outcome: Progressing Towards Goal  Goal: Complications related to the disease process, condition or treatment will be avoided or minimized  Outcome: Progressing Towards Goal     Problem: Isolation Precautions - Risk of Spread of Infection  Goal: Prevent transmission of infectious organism to others  Outcome: Progressing Towards Goal     Problem: Nutrition Deficits  Goal: Optimize nutrtional status  Outcome: Progressing Towards Goal     Problem: Risk for Fluid Volume Deficit  Goal: Maintain normal heart rhythm  Outcome: Progressing Towards Goal  Goal: Maintain absence of muscle cramping  Outcome: Progressing Towards Goal  Goal: Maintain normal serum potassium, sodium, calcium, phosphorus, and pH  Outcome: Progressing Towards Goal     Problem: Loneliness or Risk for Loneliness  Goal: Demonstrate positive use of time alone when socialization is not possible  Outcome: Progressing Towards Goal     Problem: Fatigue  Goal: Verbalize increase energy and improved vitality  Outcome: Progressing Towards Goal     Problem: Patient Education: Go to Patient Education Activity  Goal: Patient/Family Education  Outcome: Progressing Towards Goal     Problem: Breathing Pattern - Ineffective  Goal: *Absence of hypoxia  Outcome: Progressing Towards Goal  Goal: *Use of effective breathing techniques  Outcome: Progressing Towards Goal  Goal: *PALLIATIVE CARE:  Alleviation of Dyspnea  Outcome: Progressing Towards Goal     Problem: Patient Education: Go to Patient Education Activity  Goal: Patient/Family Education  Outcome: Progressing Towards Goal     Problem: High Risk or History of NICHO  Goal: Maintenance Care of NICHO  Outcome: Progressing Towards Goal     Problem: Patient Education: Go to Patient Education Activity  Goal: Patient/Family Education  Outcome: Progressing Towards Goal     Problem: Breathing Pattern - Ineffective  Goal: *Absence of hypoxia  Outcome: Progressing Towards Goal  Goal: *Use of effective breathing techniques  Outcome: Progressing Towards Goal  Goal: *PALLIATIVE CARE:  Alleviation of Dyspnea  Outcome: Progressing Towards Goal     Problem: Patient Education: Go to Patient Education Activity  Goal: Patient/Family Education  Outcome: Progressing Towards Goal     Problem: Breathing Pattern - Ineffective  Goal: *Absence of hypoxia  Outcome: Progressing Towards Goal  Goal: *Use of effective breathing techniques  Outcome: Progressing Towards Goal  Goal: *PALLIATIVE CARE:  Alleviation of Dyspnea  Outcome: Progressing Towards Goal     Problem: Patient Education: Go to Patient Education Activity  Goal: Patient/Family Education  Outcome: Progressing Towards Goal

## 2021-05-05 NOTE — ROUTINE PROCESS
Bedside and Verbal shift change report given to Pa Saavedra (oncoming nurse) by Funmi Moscoso (offgoing nurse). Report included the following information SBAR, Kardex, ED Summary, Intake/Output, MAR, Recent Results and Cardiac Rhythm NSR.

## 2021-05-06 VITALS
DIASTOLIC BLOOD PRESSURE: 88 MMHG | SYSTOLIC BLOOD PRESSURE: 159 MMHG | OXYGEN SATURATION: 93 % | HEIGHT: 70 IN | BODY MASS INDEX: 45.1 KG/M2 | WEIGHT: 315 LBS | RESPIRATION RATE: 18 BRPM | HEART RATE: 117 BPM | TEMPERATURE: 97.8 F

## 2021-05-06 PROBLEM — F32.A DEPRESSION: Status: ACTIVE | Noted: 2021-05-06

## 2021-05-06 PROBLEM — I10 HTN (HYPERTENSION): Status: ACTIVE | Noted: 2021-05-06

## 2021-05-06 LAB
ANION GAP SERPL CALC-SCNC: 5 MMOL/L (ref 5–15)
BUN SERPL-MCNC: 30 MG/DL (ref 6–20)
BUN/CREAT SERPL: 29 (ref 12–20)
CALCIUM SERPL-MCNC: 9 MG/DL (ref 8.5–10.1)
CHLORIDE SERPL-SCNC: 104 MMOL/L (ref 97–108)
CO2 SERPL-SCNC: 29 MMOL/L (ref 21–32)
COMMENT, HOLDF: NORMAL
CREAT SERPL-MCNC: 1.02 MG/DL (ref 0.7–1.3)
D DIMER PPP FEU-MCNC: 0.36 MG/L FEU (ref 0–0.65)
GLUCOSE BLD STRIP.AUTO-MCNC: 103 MG/DL (ref 65–100)
GLUCOSE BLD STRIP.AUTO-MCNC: 146 MG/DL (ref 65–100)
GLUCOSE BLD STRIP.AUTO-MCNC: 97 MG/DL (ref 65–100)
GLUCOSE SERPL-MCNC: 100 MG/DL (ref 65–100)
POTASSIUM SERPL-SCNC: 4 MMOL/L (ref 3.5–5.1)
SAMPLES BEING HELD,HOLD: NORMAL
SERVICE CMNT-IMP: ABNORMAL
SERVICE CMNT-IMP: ABNORMAL
SERVICE CMNT-IMP: NORMAL
SODIUM SERPL-SCNC: 138 MMOL/L (ref 136–145)

## 2021-05-06 PROCEDURE — 85379 FIBRIN DEGRADATION QUANT: CPT

## 2021-05-06 PROCEDURE — 74011250637 HC RX REV CODE- 250/637: Performed by: NURSE PRACTITIONER

## 2021-05-06 PROCEDURE — 36415 COLL VENOUS BLD VENIPUNCTURE: CPT

## 2021-05-06 PROCEDURE — 74011250637 HC RX REV CODE- 250/637: Performed by: FAMILY MEDICINE

## 2021-05-06 PROCEDURE — 82962 GLUCOSE BLOOD TEST: CPT

## 2021-05-06 PROCEDURE — 74011250637 HC RX REV CODE- 250/637: Performed by: HOSPITALIST

## 2021-05-06 PROCEDURE — 74011636637 HC RX REV CODE- 636/637: Performed by: PHYSICIAN ASSISTANT

## 2021-05-06 PROCEDURE — 74011250636 HC RX REV CODE- 250/636: Performed by: NURSE PRACTITIONER

## 2021-05-06 PROCEDURE — 94640 AIRWAY INHALATION TREATMENT: CPT

## 2021-05-06 PROCEDURE — 74011250637 HC RX REV CODE- 250/637: Performed by: INTERNAL MEDICINE

## 2021-05-06 PROCEDURE — 80048 BASIC METABOLIC PNL TOTAL CA: CPT

## 2021-05-06 RX ORDER — PREDNISONE 10 MG/1
TABLET ORAL
Qty: 18 TAB | Refills: 0 | Status: SHIPPED | OUTPATIENT
Start: 2021-05-07 | End: 2021-05-16

## 2021-05-06 RX ORDER — ASCORBIC ACID 500 MG
500 TABLET ORAL 2 TIMES DAILY
Qty: 60 TAB | Refills: 0 | Status: SHIPPED | OUTPATIENT
Start: 2021-05-06 | End: 2021-06-05

## 2021-05-06 RX ORDER — MELATONIN
2000 DAILY
Qty: 60 TAB | Refills: 0 | Status: SHIPPED | OUTPATIENT
Start: 2021-05-07 | End: 2021-06-06

## 2021-05-06 RX ADMIN — POLYETHYLENE GLYCOL 3350 17 G: 17 POWDER, FOR SOLUTION ORAL at 09:24

## 2021-05-06 RX ADMIN — HYDROCHLOROTHIAZIDE: 25 TABLET ORAL at 09:25

## 2021-05-06 RX ADMIN — ALBUTEROL SULFATE 2 PUFF: 108 AEROSOL, METERED RESPIRATORY (INHALATION) at 08:00

## 2021-05-06 RX ADMIN — ENOXAPARIN SODIUM 40 MG: 100 INJECTION SUBCUTANEOUS at 03:02

## 2021-05-06 RX ADMIN — ALBUTEROL SULFATE 2 PUFF: 108 AEROSOL, METERED RESPIRATORY (INHALATION) at 14:00

## 2021-05-06 RX ADMIN — PREDNISONE 40 MG: 20 TABLET ORAL at 09:26

## 2021-05-06 RX ADMIN — ENOXAPARIN SODIUM 40 MG: 100 INJECTION SUBCUTANEOUS at 16:00

## 2021-05-06 RX ADMIN — OXYCODONE HYDROCHLORIDE AND ACETAMINOPHEN 500 MG: 500 TABLET ORAL at 09:26

## 2021-05-06 RX ADMIN — ATORVASTATIN CALCIUM 20 MG: 20 TABLET, FILM COATED ORAL at 09:26

## 2021-05-06 RX ADMIN — CITALOPRAM HYDROBROMIDE 40 MG: 20 TABLET ORAL at 09:26

## 2021-05-06 RX ADMIN — Medication 1 CAPSULE: at 09:26

## 2021-05-06 RX ADMIN — Medication 10 ML: at 06:43

## 2021-05-06 RX ADMIN — Medication 2000 UNITS: at 09:25

## 2021-05-06 NOTE — PROGRESS NOTES
Pulse oximetry assessment   92% at rest on room air (if 88% or less, skip next steps)  86% while ambulating on room air  92% at rest on 2LPM  90% while ambulating on 2LPM

## 2021-05-06 NOTE — PROGRESS NOTES
[address] May 6, 2021       RE: Jailene Ramirez      To Whom It May Concern,    This is to certify that Jailene Ramirez may may return to work on 05/20/2021. Please feel free to contact my office if you have any questions or concerns. Thank you for your assistance in this matter.       Sincerely,  Dangelo Castle MD

## 2021-05-06 NOTE — PROGRESS NOTES
Bedside and Verbal shift change report given to Jess Crooks Rn (oncoming nurse) by Agustín Trevino (offgoing nurse). Report included the following information SBAR, Kardex, ED Summary, Intake/Output, MAR, Recent Results and Cardiac Rhythm NSR.

## 2021-05-06 NOTE — PROGRESS NOTES
Bedside shift change report given to [place] [id number] (oncoming nurse) by Leonides Meckel RN (offgoing nurse). Report included the following information SBAR, Intake/Output, MAR, Med Rec Status and Cardiac Rhythm nsr.

## 2021-05-06 NOTE — PROGRESS NOTES
Transitions of Care: 14% risk of re-admission      -Discharge to his home vs. Daughter's home- MD endorses patient is improving, can have home health or not, will follow-up with patient and daughter directly    -Family to transport      The CM spoke with attending MD- goal is to discharge patient today so long as he is stable on room air. The CM attempted to call patient directly- unable to reach. The CM called Gustavo Rojas, the patient's daughter- she will talk with her dad today, he will either discharge to his home vs. Daughter Makayla's home- CM explained that CM would have to call to see if home health can see the patient at her address, address below:     [address] Ro[address] will call CM back once she speaks to her father further- she thinks the patient does not want home health, will monitor- she will call CM back, [place] has accepted if patient/family would like home health for follow-up. 10:55 a.m.- The CM received call from Gustavo Strangegeo, she spoke with her father- he prefers to return to his own home, does not want home health- will talk with patient directly. Gustavo Rojas is supportive, she endorses if when patient gets home he feels like he needs additional support, he will go to her home. CM discussed with RN- will ambulate patient to ensure he is stable on room air. Patient has [employer] insurance- will need Oxygen sats for COVID diagnosis- would need to qualify. 11:25 a.m.- Anticipate home Oxygen needs- see RN note, awaiting MD Home Oxygen order- will send to [address], they have office in [facility]. The CM spoke with patient- he confirmed above plan, declining home health- he has improved and no longer feels like he needs home health services. CM will work on getting home Oxygen established.      12:08 p.m.- CM called daughter Gustavo Rojas- provided update on above, will notify her when to leave [facility]- awaiting estimated delivery time for Oxygen. 12:15 p.m.- The CM received call from Boy with [facility]- they have daughter's contact for home concentrator delivery. Will fax the CMN to CM- will get MD to sign and return, CM will call daughter when eta of portable tank is received. 13:15 p.m.- CM spoke with Severino Denis with [facility]- on the way to [facility] with tank- will notify daughter. 13:59 p.m.- CM spoke with daughter Navjot Shipman- will pick-up Oxygen concentrator at 3:00 from [facility]- will come to [facility] to transport patient shortly after. Will be at [facility] around 4:30 p.m. to transport the patient.        JAC Sebastian

## 2021-05-06 NOTE — DISCHARGE INSTRUCTIONS
Dear Cata Garcia,    Thank you for choosing [facility] for your healthcare needs. We strive to provide EXCELLENT care to you and your family. In an effort to explain clearly why you were here in the hospital, I've also written a very brief summary below. Other details including formal diagnosis, medication changes, and follow up appointment recommendations can also be found in this packet. You were admitted for shortness of breath due to [address] for which you were started on high dose oxygen and steroids, you were also given actemera, and remdesivir. You also received care from specialist physicians in the following specialties:  [address] TO INFECTIOUS DISEASES    Here are the updates to your medication list:  **Start prednisone taper   **VitC, and Vit D as needed  **You do not have evidence of bacterial infection, and you do not need any antibiotics. Remember that it is important for you to take your medications exactly as they are prescribed. It is helpful to keep a list of your medication with the names, dosages, and times to be taken in your wallet. Additionally,   - Please make sure to follow up with your primary care physician within 1-2 weeks of discharge for hospital follow up. You should have a tele-health appointment. - Please make sure to continue to monitor for: worsening chest pain, increased shortness of breath, sudden lower extremity swelling, sudden signs of a stroke, sudden onset high fevers and return to the Emergency Department with any of these symptoms.   - Please get up slowly from a seated or laying position, avoid falls. - Avoid tobacco, alcohol and other illicit drug use. - Please try and lay on your stomach as much as possible. This has been shown to assist with prevention of pneumonia and improvement in oxygen levels. - You must stay under quarantine for a minimum of 10 days from today.  Your last 3 days must be symptoms free. You should discuss coming off quarantine with your physician prior to taking yourself off quarantine.  - Please buy a pulse ox, monitor closely. If you remain under 88% despite taking deep breaths, then you must come back to the hospital for evaluation. Make sure to also see your primary care doctor for follow-up. Bring these papers with you and be sure to review your medication list with your doctor. I cannot stress the importance of follow up enough. I've included the information for your follow-up appointments below: Follow-up Information     Follow up With Specialties Details Why Contact Info    Primary care physician               Should you have any fever over 101 degrees for 24 hours, chest pain, shortness of breath, fever, chills, nausea, vomiting, diarrhea, change in mentation, falling, weakness, bleeding, or worsening pain, please seek medical attention immediately. Finally, as your discharging physician, you may be receiving a survey regarding my care. I would greatly value and appreciate your input in the survey as we strive for excellence. If you have any questions, I can be reached at [phone].   Thank you so much again for allowing me to care for you at [address] [place] [address].    Respectfully yours,  Mary Bland MD

## 2021-05-06 NOTE — DISCHARGE SUMMARY
Discharge Summary       PATIENT ID: Mendoza Paez  MRN: [id number]   YOB: 1970    DATE OF ADMISSION: 4/20/2021  3:00 PM    DATE OF DISCHARGE: 05/06/2021   PRIMARY CARE PROVIDER: None     DISCHARGING PROVIDER: Devaughn Rodriguez MD    To contact this individual call [phone] and ask the  to page. If unavailable ask to be transferred the Adult Hospitalist Department. CONSULTATIONS: IP CONSULT TO PULMONOLOGY  IP CONSULT TO INFECTIOUS DISEASES    PROCEDURES/SURGERIES: * No surgery found *    ADMITTING DIAGNOSES & HOSPITAL COURSE:   COVID 19   Acute hypoxic respiratory failure      Per H and P      Mendoza Paez is a 48 y.o. male with past medical history of asthma, hypertension, hyperlipidemia and morbid obesity presented to [facility] ED this morning with increased shortness of breath. Patient states over the past 3 weeks his allergies have been bothering him, normally he is able to take steriods and then feel better but 1 week ago he had increased shortness of breath and went to the hospital.  At that time he was diagnosed with COVID-19 and since then his breathing has become worse. This morning when he woke up he felt like he could not breathe and went to the emergency department. Currently he is on 6 L nasal cannula and does not appear to be in distress. Patient was transferred to [facility] for further treatment and hospitalist consulted for admission. Course:  Patient was admitted and started on oxygen, with increased O2 requirements requiring HFNC, and NIV. Started on steroids, and given a dose of actemera. Improving slowly. Now on RA and doing well at rest. Desaturated to 86% on RA with ambulation, therefore will DC with home O2, likely temporary and can be discontinued after follow up with PCP. DISCHARGE DIAGNOSES / PLAN:      Acute hypoxic respiratory failure  COVID 19 PNA  - Improving, weaned to RA. Requiring O2 with ambulation.  Will DC home on [facility]   - s/p actemra, and completed remdesivir   - Prednisone taper on DC    Abnormal LFTs - likely secondary to COVID  Depression - home meds  HTN - home losartan, HCTZ   NICHO - home CPAP  Morbid obesity -      ADDITIONAL CARE RECOMMENDATIONS:   - Please take all medications as prescribed. Note changes as below. **Start prednisone taper   **VitC, and Vit D as needed  **You do not have evidence of bacterial infection, and you do not need any antibiotics. - Please make sure to follow up with your primary care physician within 1-2 weeks of discharge for hospital follow up. You should have a tele-health appointment. - Please make sure to continue to monitor for: worsening chest pain, increased shortness of breath, sudden lower extremity swelling, sudden signs of a stroke, sudden onset high fevers and return to the Emergency Department with any of these symptoms.   - Please get up slowly from a seated or laying position, avoid falls. - Avoid tobacco, alcohol and other illicit drug use. - Please try and lay on your stomach as much as possible. This has been shown to assist with prevention of pneumonia and improvement in oxygen levels. - You must stay under quarantine for a minimum of 10 days from today. Your last 3 days must be symptoms free. You should discuss coming off quarantine with your physician prior to taking yourself off quarantine.  - Please buy a pulse ox, monitor closely. If you remain under 88% despite taking deep breaths, then you must come back to the hospital for evaluation.         PENDING TEST RESULTS:   At the time of discharge the following test results are still pending: None    FOLLOW UP APPOINTMENTS:    Follow-up Information     Follow up With Specialties Details Why Contact Info    Primary care physician   In 1 week               DIET: Resume previous diet    ACTIVITY: Activity as tolerated    WOUND CARE: None    EQUIPMENT needed: None      DISCHARGE MEDICATIONS:  Current Discharge Medication List START taking these medications    Details   ascorbic acid, vitamin C, (VITAMIN C) 500 mg tablet Take 1 Tab by mouth two (2) times a day for 30 days. Qty: 60 Tab, Refills: 0      cholecalciferol (VITAMIN D3) (1000 Units /25 mcg) tablet Take 2 Tabs by mouth daily for 30 days. Qty: 60 Tab, Refills: 0      predniSONE (DELTASONE) 10 mg tablet Take 30 mg by mouth daily (with breakfast) for 3 days, THEN 20 mg daily (with breakfast) for 3 days, THEN 10 mg daily (with breakfast) for 3 days. Qty: 18 Tab, Refills: 0         CONTINUE these medications which have NOT CHANGED    Details   atorvastatin (LIPITOR) 20 mg tablet TAKE 1 TABLET BY MOUTH EVERY DAY      citalopram (CELEXA) 40 mg tablet TAKE ONE TABLET BY MOUTH ONCE DAILY      valsartan-hydroCHLOROthiazide (DIOVAN-HCT) 320-25 mg per tablet TAKE 1 TABLET BY MOUTH EVERY DAY      montelukast (SINGULAIR) 10 mg tablet TAKE 1 TABLET BY MOUTH EVERY DAY      albuterol (PROVENTIL HFA, VENTOLIN HFA, PROAIR HFA) 90 mcg/actuation inhaler TAKE 2 PUFFS EVERY 6 HOURS AS NEEDED FOR WHEEZING               NOTIFY YOUR PHYSICIAN FOR ANY OF THE FOLLOWING:   Fever over 101 degrees for 24 hours. Chest pain, shortness of breath, fever, chills, nausea, vomiting, diarrhea, change in mentation, falling, weakness, bleeding. Severe pain or pain not relieved by medications. Or, any other signs or symptoms that you may have questions about.     DISPOSITION:  X  Home With:   OT  PT  HH  RN       Long term SNF/Inpatient Rehab    Independent/assisted living    Hospice    Other:       PATIENT CONDITION AT DISCHARGE:     Functional status    Poor     Deconditioned    X Independent      Cognition    X Lucid     Forgetful     Dementia      Catheters/lines (plus indication)    [place]     PICC     PEG    X None      Code status    X Full code     DNR      PHYSICAL EXAMINATION AT DISCHARGE:  Visit Vitals  BP (!) 159/88 (BP 1 Location: Right arm, BP Patient Position: Sitting)   Pulse (!) 117   Temp 97.8 °F (36.6 °C)   Resp 18   Ht 5' 10\" (1.778 m)   Wt 153 kg (337 lb 3.2 oz)   SpO2 93%   BMI 48.38 kg/m²     Gen: NAD, awake in bed, obese.    HEENT: NC/AT, sclera anicteric, PERRL, EOMI  CV: RRR no m/r/g, normal S1 and S2, no pedal edema   Resp: CTA b/l no increased work of breathing, no wheezing or rhonchi, speaking in full sentences   Abd: NT/ND, normal bowel sounds, no rebound or guarding  Ext: 2+ pulses, no edema  Skin: No rashes or lesions        CHRONIC MEDICAL DIAGNOSES:  Problem List as of 5/6/2021 Date Reviewed: 5/6/2021          Codes Class Noted - Resolved    HTN (hypertension) ICD-10-CM: I10  ICD-9-CM: 401.9  5/6/2021 - Present        Depression ICD-10-CM: F32.9  ICD-9-CM: 311  5/6/2021 - Present        Respiratory failure ([address] ICD-10-CM: J96.90  ICD-9-CM: 518.81  4/20/2021 - Present        COVID-19 ICD-10-CM: U07.1  ICD-9-CM: 079.89  4/20/2021 - Present              Greater than 30 minutes were spent with the patient on counseling and coordination of care    Signed:   Chandrika Maria MD  5/6/2021  10:04 AM

## 2021-05-06 NOTE — PROGRESS NOTES
[address] May 6, 2021       RE: River Jain      To Whom It May Concern,    This is to certify that River Jain may may return to work on 05/13/2021. Please feel free to contact my office if you have any questions or concerns. Thank you for your assistance in this matter.       Sincerely,  Abelardo Elizondo MD

## 2021-05-06 NOTE — PROGRESS NOTES
Hospital follow-up Virtual PCP transitional care appointment has been scheduled with Erlinda Mccauley NP for Tuesday, 5/11/21 at 11:15 a.m. Pending patient discharge.   Tom Veloz, Care Management Specialist.

## 2021-05-07 ENCOUNTER — PATIENT OUTREACH (OUTPATIENT)
Dept: CASE MANAGEMENT | Age: 51
End: 2021-05-07

## 2021-05-07 NOTE — PROGRESS NOTES
Attempted to contact patient for [facility] follow up. Patient advised he did not wish to talk at the time of contact and would call CTN back later in the day when he was available. Patient did not return call as expected. DA episode resolved d/t inability to contact patient.

## 2021-07-01 ENCOUNTER — TRANSCRIBE ORDER (OUTPATIENT)
Dept: REGISTRATION | Age: 51
End: 2021-07-01

## 2021-07-01 ENCOUNTER — HOSPITAL ENCOUNTER (OUTPATIENT)
Dept: GENERAL RADIOLOGY | Age: 51
Discharge: HOME | End: 2021-07-01
Payer: COMMERCIAL

## 2021-07-01 DIAGNOSIS — J98.4 DISEASE OF LUNG: ICD-10-CM

## 2021-07-01 DIAGNOSIS — U07.1 CLINICAL DIAGNOSIS OF SEVERE ACUTE RESPIRATORY SYNDROME CORONAVIRUS 2 (SARS-COV-2) DISEASE: Primary | ICD-10-CM

## 2021-07-01 DIAGNOSIS — U07.1 CLINICAL DIAGNOSIS OF SEVERE ACUTE RESPIRATORY SYNDROME CORONAVIRUS 2 (SARS-COV-2) DISEASE: ICD-10-CM

## 2021-07-01 PROCEDURE — 71046 X-RAY EXAM CHEST 2 VIEWS: CPT

## 2022-03-18 PROBLEM — F32.A DEPRESSION: Status: ACTIVE | Noted: 2021-05-06

## 2022-03-18 PROBLEM — J96.90 RESPIRATORY FAILURE (HCC): Status: ACTIVE | Noted: 2021-04-20

## 2022-03-19 PROBLEM — I10 HTN (HYPERTENSION): Status: ACTIVE | Noted: 2021-05-06

## 2022-03-20 PROBLEM — U07.1 COVID-19: Status: ACTIVE | Noted: 2021-04-20

## 2022-10-25 ENCOUNTER — TRANSCRIBE ORDER (OUTPATIENT)
Dept: SCHEDULING | Age: 52
End: 2022-10-25

## 2022-10-25 DIAGNOSIS — R06.00 DYSPNEA: Primary | ICD-10-CM

## 2022-10-28 ENCOUNTER — HOSPITAL ENCOUNTER (OUTPATIENT)
Dept: VASCULAR SURGERY | Age: 52
Discharge: HOME | End: 2022-10-28
Attending: NURSE PRACTITIONER
Payer: COMMERCIAL

## 2022-10-28 ENCOUNTER — HOSPITAL ENCOUNTER (OUTPATIENT)
Dept: NUCLEAR MEDICINE | Age: 52
Discharge: HOME | End: 2022-10-28
Attending: NURSE PRACTITIONER
Payer: COMMERCIAL

## 2022-10-28 DIAGNOSIS — R06.00 DYSPNEA: ICD-10-CM

## 2022-10-28 LAB
ECHO AO ROOT DIAM: 3.2 CM
ECHO AV AREA PEAK VELOCITY: 3.4 CM2
ECHO AV AREA VTI: 3.3 CM2
ECHO AV MEAN GRADIENT: 3 MMHG
ECHO AV MEAN VELOCITY: 0.8 M/S
ECHO AV PEAK GRADIENT: 6 MMHG
ECHO AV PEAK VELOCITY: 1.2 M/S
ECHO AV VELOCITY RATIO: 0.92
ECHO AV VTI: 24 CM
ECHO LA DIAMETER: 3.2 CM
ECHO LA TO AORTIC ROOT RATIO: 1
ECHO LA VOL 2C: 40 ML (ref 18–58)
ECHO LA VOL 4C: 40 ML (ref 18–58)
ECHO LA VOL BP: 43 ML (ref 18–58)
ECHO LA VOLUME AREA LENGTH: 48 ML
ECHO LV E' LATERAL VELOCITY: 12 CM/S
ECHO LV E' SEPTAL VELOCITY: 12 CM/S
ECHO LV EDV A2C: 58 ML
ECHO LV EDV A4C: 70 ML
ECHO LV EDV BP: 64 ML (ref 67–155)
ECHO LV EJECTION FRACTION A2C: 73 %
ECHO LV EJECTION FRACTION A4C: 70 %
ECHO LV EJECTION FRACTION BIPLANE: 69 % (ref 55–100)
ECHO LV ESV A2C: 15 ML
ECHO LV ESV A4C: 21 ML
ECHO LV ESV BP: 20 ML (ref 22–58)
ECHO LV FRACTIONAL SHORTENING: 37 % (ref 28–44)
ECHO LV GLOBAL LONGITUDINAL STRAIN (GLS): -11.6 %
ECHO LV GLOBAL LONGITUDINAL STRAIN (GLS): -4.6 %
ECHO LV GLOBAL LONGITUDINAL STRAIN (GLS): -8.6 %
ECHO LV GLOBAL LONGITUDINAL STRAIN (GLS): -9.5 %
ECHO LV INTERNAL DIMENSION DIASTOLIC: 3.5 CM (ref 4.2–5.9)
ECHO LV INTERNAL DIMENSION SYSTOLIC: 2.2 CM
ECHO LV IVSD: 1.2 CM (ref 0.6–1)
ECHO LV MASS 2D: 144.6 G (ref 88–224)
ECHO LV POSTERIOR WALL DIASTOLIC: 1.3 CM (ref 0.6–1)
ECHO LV RELATIVE WALL THICKNESS RATIO: 0.74
ECHO LVOT AREA: 3.8 CM2
ECHO LVOT AV VTI INDEX: 0.87
ECHO LVOT DIAM: 2.2 CM
ECHO LVOT MEAN GRADIENT: 3 MMHG
ECHO LVOT PEAK GRADIENT: 5 MMHG
ECHO LVOT PEAK VELOCITY: 1.1 M/S
ECHO LVOT SV: 79 ML
ECHO LVOT VTI: 20.8 CM
ECHO MV A VELOCITY: 0.72 M/S
ECHO MV E DECELERATION TIME (DT): 144.2 MS
ECHO MV E VELOCITY: 0.78 M/S
ECHO MV E/A RATIO: 1.08
ECHO MV E/E' LATERAL: 6.5
ECHO MV E/E' RATIO (AVERAGED): 6.5
ECHO MV E/E' SEPTAL: 6.5
ECHO RV INTERNAL DIMENSION: 3.3 CM
ECHO RV TAPSE: 2.5 CM (ref 1.7–?)

## 2022-10-28 PROCEDURE — 93017 CV STRESS TEST TRACING ONLY: CPT

## 2022-10-28 PROCEDURE — 93306 TTE W/DOPPLER COMPLETE: CPT

## 2022-10-28 PROCEDURE — A9500 TC99M SESTAMIBI: HCPCS

## 2022-10-28 RX ORDER — TETRAKIS(2-METHOXYISOBUTYLISOCYANIDE)COPPER(I) TETRAFLUOROBORATE 1 MG/ML
30 INJECTION, POWDER, LYOPHILIZED, FOR SOLUTION INTRAVENOUS
Status: SHIPPED | OUTPATIENT
Start: 2022-10-28 | End: 2022-10-28

## 2022-10-31 ENCOUNTER — HOSPITAL ENCOUNTER (OUTPATIENT)
Dept: VASCULAR SURGERY | Age: 52
End: 2022-10-31
Attending: NURSE PRACTITIONER
Payer: COMMERCIAL

## 2022-10-31 ENCOUNTER — HOSPITAL ENCOUNTER (OUTPATIENT)
Dept: NON INVASIVE DIAGNOSTICS | Age: 52
Discharge: HOME | End: 2022-10-31
Attending: NURSE PRACTITIONER
Payer: COMMERCIAL

## 2022-10-31 ENCOUNTER — HOSPITAL ENCOUNTER (OUTPATIENT)
Dept: NUCLEAR MEDICINE | Age: 52
End: 2022-10-31
Attending: NURSE PRACTITIONER
Payer: COMMERCIAL

## 2022-10-31 ENCOUNTER — HOSPITAL ENCOUNTER (OUTPATIENT)
Dept: NUCLEAR MEDICINE | Age: 52
Discharge: HOME | End: 2022-10-31
Attending: NURSE PRACTITIONER
Payer: COMMERCIAL

## 2022-10-31 LAB
NUC STRESS EJECTION FRACTION: 65 %
STRESS BASELINE DIAS BP: 57 MMHG
STRESS BASELINE HR: 84 BPM
STRESS BASELINE SYS BP: 117 MMHG
STRESS PEAK DIAS BP: 57 MMHG
STRESS PEAK SYS BP: 121 MMHG
STRESS PERCENT HR ACHIEVED: 54 %
STRESS POST PEAK HR: 91 BPM
STRESS RATE PRESSURE PRODUCT: NORMAL BPM*MMHG
STRESS TARGET HR: 168 BPM

## 2022-10-31 PROCEDURE — 74011250636 HC RX REV CODE- 250/636: Performed by: NURSE PRACTITIONER

## 2022-10-31 PROCEDURE — 74011000258 HC RX REV CODE- 258: Performed by: NURSE PRACTITIONER

## 2022-10-31 RX ORDER — TETRAKIS(2-METHOXYISOBUTYLISOCYANIDE)COPPER(I) TETRAFLUOROBORATE 1 MG/ML
30 INJECTION, POWDER, LYOPHILIZED, FOR SOLUTION INTRAVENOUS
Status: SHIPPED | OUTPATIENT
Start: 2022-10-31 | End: 2022-10-31

## 2022-10-31 RX ADMIN — ADENOSINE: 3 INJECTION, SOLUTION INTRAVENOUS at 08:35

## 2023-01-10 ENCOUNTER — OFFICE VISIT (OUTPATIENT)
Dept: ENT CLINIC | Age: 53
End: 2023-01-10
Payer: COMMERCIAL

## 2023-01-10 VITALS
HEART RATE: 85 BPM | RESPIRATION RATE: 20 BRPM | WEIGHT: 315 LBS | SYSTOLIC BLOOD PRESSURE: 142 MMHG | HEIGHT: 70 IN | DIASTOLIC BLOOD PRESSURE: 80 MMHG | OXYGEN SATURATION: 96 % | BODY MASS INDEX: 45.1 KG/M2

## 2023-01-10 DIAGNOSIS — H60.541 DERMATITIS OF RIGHT EAR CANAL: ICD-10-CM

## 2023-01-10 DIAGNOSIS — H61.23 BILATERAL IMPACTED CERUMEN: ICD-10-CM

## 2023-01-10 DIAGNOSIS — J30.9 ALLERGIC RHINITIS, UNSPECIFIED SEASONALITY, UNSPECIFIED TRIGGER: ICD-10-CM

## 2023-01-10 DIAGNOSIS — H69.83 ETD (EUSTACHIAN TUBE DYSFUNCTION), BILATERAL: Primary | ICD-10-CM

## 2023-01-10 PROCEDURE — 3079F DIAST BP 80-89 MM HG: CPT | Performed by: OTOLARYNGOLOGY

## 2023-01-10 PROCEDURE — 99203 OFFICE O/P NEW LOW 30 MIN: CPT | Performed by: OTOLARYNGOLOGY

## 2023-01-10 PROCEDURE — 3077F SYST BP >= 140 MM HG: CPT | Performed by: OTOLARYNGOLOGY

## 2023-01-10 RX ORDER — AMLODIPINE BESYLATE 5 MG/1
5 TABLET ORAL DAILY
COMMUNITY
Start: 2022-10-24

## 2023-01-10 NOTE — Clinical Note
Can I see him in [address] Office, in about 2 weeks, audio first, see me after, Maybe Tues Jan 31, 1140 or 3

## 2023-01-10 NOTE — PROGRESS NOTES
No chief complaint on file.       Visit Vitals  BP (!) 142/80   Pulse 85   Resp 20   Ht 5' 10\" (1.778 m)   Wt (!) 380 lb 9.6 oz (172.6 kg)   SpO2 96%   BMI 54.61 kg/m²

## 2023-01-10 NOTE — PROGRESS NOTES
Otolaryngology-Head and Neck Surgery  New Patient Visit     Patient: Jewell Tatum  YOB: 1970  MRN: [id number]  Date of Service: 1/10/2023    Chief Complaint: No chief complaint on file. History of Present Illness: Jewell Tatum is a 46y.o. year old male who presents today for discussion of his ears    Has a history of chronic ear and allergy issues    Hx of allergies - was on prior allergy shots; age 15- into age 29's which did help  Now on Singulair and takes OTC Rx PRN     NICHO on CPAP    Prior septoplasty and sinus surgery some years ago    Prior ear tubes as an adult, but many years ago    Has a couple ear infections / year  R ear itches frequently  Has R pulsatile tinnitus which is chronic     Probably some hearing loss, worse lately     + Noise exposures     Past Medical History:  Past Medical History:   Diagnosis Date    Asthma     High cholesterol     Hypertension     Sleep apnea        Past Surgical History:   Past Surgical History:   Procedure Laterality Date    HX HERNIA REPAIR      HX OPEN REDUCTION INTERNAL FIXATION      rt leg       Medications:   Current Outpatient Medications   Medication Instructions    albuterol (PROVENTIL HFA, VENTOLIN HFA, PROAIR HFA) 90 mcg/actuation inhaler TAKE 2 PUFFS EVERY 6 HOURS AS NEEDED FOR WHEEZING    atorvastatin (LIPITOR) 20 mg tablet TAKE 1 TABLET BY MOUTH EVERY DAY    citalopram (CELEXA) 40 mg tablet TAKE ONE TABLET BY MOUTH ONCE DAILY    montelukast (SINGULAIR) 10 mg tablet TAKE 1 TABLET BY MOUTH EVERY DAY    valsartan-hydroCHLOROthiazide (DIOVAN-HCT) 320-25 mg per tablet TAKE 1 TABLET BY MOUTH EVERY DAY       Allergies: Allergies   Allergen Reactions    Penicillins Unknown (comments)       Social History:   Social History     Tobacco Use    Smoking status: Never    Smokeless tobacco: Current   Substance Use Topics    Alcohol use: Never        Family History:  No family history on file.     Review of Systems:    Consitutional: denies fever, excessive weight gain or loss. Eyes: denies diplopia, eye pain. Integumentary: denies new concerning skin lesions. Ears, Nose, Mouth, Throat: denies except as per HPI. Endocrine: denies hot or cold intolerance, increased thirst.  Respiratory: denies cough, hemoptysis, wheezing  Gastrointestinal: denies trouble swallowing, nausea, emesis, regurgitation  Musculoskeletal: denies muscle weakness or wasting  Cardiovascular: denies chest pain, shortness of breath  Neurologic: denies seizures, numbness or tingling, syncope  Hematologic: denies easy bleeding or bruising    Physical Examination:   There were no vitals filed for this visit. General: Comfortable, pleasant, appears stated age  Voice: Strong, speaking in full sentences, no stridor    Face: No masses or lesions, facial strength symmetric   Ears: External ears unremarkable. Bilateral ear canal with cerumen, skin and debris medial canal, obscuring view of TM. R TM likely with fungal elements. Nose: External nose unremarkable. Dorsum midline. Anterior rhinoscopy demonstrates no lesions. Septum midline. Turbinates without hypertrophy. Oral Cavity / Oropharynx: No trismus. Mucosa pink and moist. No lesions. Tongue is midline and mobile. Palate elevates symmetrically. Uvula midline. Tonsils unremarkable. Base of tongue soft. Floor of mouth soft. Hyperkeratotic changes, bilateral buccal mucosa. Crowded pharynx, mallampati 4  Neck: Supple. No adenopathy. Thyroid unremarkable. Palpable laryngeal landmarks. Full neck range of motion   Neurologic: CN II - XI intact.  Normal gait      Assessment and Plan:   Bilateral cerumen impactions  R pulsatile tinnitus  Bilateral hearing loss  R fungal otitis externa  R ear canal dermatitis  - Ears not able to be debrided well as cerumen/skin/debris medial overlying canal, and no microscope at this office  - Ketoconazole applied to right ear canal  - Follow up in 2 weeks [address] [place] [address] office, will plan to see me, and likely plan audiogram to follow  - Pending exam and audiogram findings, consider pulsatile tinnitus evaluation further         The patient was instructed to return to clinic if no improvement or progression of symptoms. Signs to watch out for reviewed.       MD [facility]  [address] [place] [address]  Office Phone: [phone]

## 2023-02-06 ENCOUNTER — OFFICE VISIT (OUTPATIENT)
Dept: ENT CLINIC | Age: 53
End: 2023-02-06
Payer: COMMERCIAL

## 2023-02-06 ENCOUNTER — OFFICE VISIT (OUTPATIENT)
Dept: ENT CLINIC | Age: 53
End: 2023-02-06

## 2023-02-06 VITALS
WEIGHT: 315 LBS | HEIGHT: 70 IN | SYSTOLIC BLOOD PRESSURE: 142 MMHG | DIASTOLIC BLOOD PRESSURE: 92 MMHG | RESPIRATION RATE: 17 BRPM | OXYGEN SATURATION: 96 % | HEART RATE: 104 BPM | BODY MASS INDEX: 45.1 KG/M2

## 2023-02-06 DIAGNOSIS — H61.23 BILATERAL IMPACTED CERUMEN: ICD-10-CM

## 2023-02-06 DIAGNOSIS — H90.A31 MIXED CONDUCTIVE AND SENSORINEURAL HEARING LOSS OF RIGHT EAR WITH RESTRICTED HEARING OF LEFT EAR: Primary | ICD-10-CM

## 2023-02-06 DIAGNOSIS — H90.A11 CONDUCTIVE HEARING LOSS OF RIGHT EAR WITH RESTRICTED HEARING OF LEFT EAR: ICD-10-CM

## 2023-02-06 DIAGNOSIS — H93.A1 PULSATILE TINNITUS OF RIGHT EAR: Primary | ICD-10-CM

## 2023-02-06 DIAGNOSIS — H90.A22 SENSORINEURAL HEARING LOSS (SNHL) OF LEFT EAR WITH RESTRICTED HEARING OF RIGHT EAR: ICD-10-CM

## 2023-02-06 DIAGNOSIS — H93.A1 PULSATILE TINNITUS OF RIGHT EAR: ICD-10-CM

## 2023-02-06 PROCEDURE — 3080F DIAST BP >= 90 MM HG: CPT | Performed by: OTOLARYNGOLOGY

## 2023-02-06 PROCEDURE — 92557 COMPREHENSIVE HEARING TEST: CPT | Performed by: AUDIOLOGIST

## 2023-02-06 PROCEDURE — 99213 OFFICE O/P EST LOW 20 MIN: CPT | Performed by: OTOLARYNGOLOGY

## 2023-02-06 PROCEDURE — 92567 TYMPANOMETRY: CPT | Performed by: AUDIOLOGIST

## 2023-02-06 PROCEDURE — 3077F SYST BP >= 140 MM HG: CPT | Performed by: OTOLARYNGOLOGY

## 2023-02-06 PROCEDURE — 69210 REMOVE IMPACTED EAR WAX UNI: CPT | Performed by: OTOLARYNGOLOGY

## 2023-02-06 NOTE — PROGRESS NOTES
Otolaryngology-Head and Neck Surgery  Follow Up Patient Visit     Patient: Liliana Contreras  YOB: 1970  MRN: [id number]  Date of Service: 2/6/2023    Chief Complaint:   Chief Complaint   Patient presents with    Follow-up    Wax in Ear     Interval hx:   Ear a little less itchy     History of Present Illness: Liliana Contreras is a 46y.o. year old male who presents today for discussion of his ears    Has a history of chronic ear and allergy issues    Hx of allergies - was on prior allergy shots; age 15- into age 29's which did help  Now on Singulair and takes OTC Rx PRN     NICHO on CPAP    Prior septoplasty and sinus surgery some years ago    Prior ear tubes as an adult, but many years ago    Has a couple ear infections / year  R ear itches frequently  Has R pulsatile tinnitus which is chronic     Probably some hearing loss, worse lately     + Noise exposures     Past Medical History:  Past Medical History:   Diagnosis Date    Asthma     High cholesterol     Hypertension     Sleep apnea        Past Surgical History:   Past Surgical History:   Procedure Laterality Date    HX HERNIA REPAIR      HX OPEN REDUCTION INTERNAL FIXATION      rt leg       Medications:   Current Outpatient Medications   Medication Instructions    albuterol (PROVENTIL HFA, VENTOLIN HFA, PROAIR HFA) 90 mcg/actuation inhaler TAKE 2 PUFFS EVERY 6 HOURS AS NEEDED FOR WHEEZING    amLODIPine (NORVASC) 5 mg, Oral, DAILY    atorvastatin (LIPITOR) 20 mg tablet TAKE 1 TABLET BY MOUTH EVERY DAY    citalopram (CELEXA) 40 mg tablet TAKE ONE TABLET BY MOUTH ONCE DAILY    montelukast (SINGULAIR) 10 mg tablet TAKE 1 TABLET BY MOUTH EVERY DAY    valsartan-hydroCHLOROthiazide (DIOVAN-HCT) 320-25 mg per tablet TAKE 1 TABLET BY MOUTH EVERY DAY       Allergies:    Allergies   Allergen Reactions    Penicillins Unknown (comments)       Social History:   Social History     Tobacco Use    Smoking status: Never    Smokeless tobacco: Current   Vaping Use    Vaping Use: Never used   Substance Use Topics    Alcohol use: Never        Family History:  History reviewed. No pertinent family history. Review of Systems:    Consitutional: denies fever, excessive weight gain or loss. Eyes: denies diplopia, eye pain. Integumentary: denies new concerning skin lesions. Ears, Nose, Mouth, Throat: denies except as per HPI. Endocrine: denies hot or cold intolerance, increased thirst.  Respiratory: denies cough, hemoptysis, wheezing  Gastrointestinal: denies trouble swallowing, nausea, emesis, regurgitation  Musculoskeletal: denies muscle weakness or wasting  Cardiovascular: denies chest pain, shortness of breath  Neurologic: denies seizures, numbness or tingling, syncope  Hematologic: denies easy bleeding or bruising    Physical Examination:   Vitals:    02/06/23 0928   BP: (!) 142/92   BP 1 Location: Left lower arm   BP Patient Position: Sitting   BP Cuff Size: Adult   Pulse: (!) 104   Resp: 17   Height: 5' 10\" (1.778 m)   Weight: (!) 380 lb (172.4 kg)   SpO2: 96%        General: Comfortable, pleasant, appears stated age  Voice: Strong, speaking in full sentences, no stridor    Face: No masses or lesions, facial strength symmetric   Ears: External ears unremarkable. Bilateral ear canal with cerumen, skin and debris medial canal, obscuring view of TM. R EAC with resolution of previously noted fungal elements. Following removal, mild L TM retraction, R TM thickened but no obvious effusion   Nose: External nose unremarkable. Dorsum midline. Anterior rhinoscopy demonstrates no lesions. Septum midline. Turbinates without hypertrophy. Oral Cavity / Oropharynx: No trismus. Mucosa pink and moist. No lesions. Tongue is midline and mobile. Palate elevates symmetrically. Uvula midline. Tonsils unremarkable. Base of tongue soft. Floor of mouth soft. Hyperkeratotic changes, bilateral buccal mucosa. Crowded pharynx, mallampati 4  Neck: Supple. No adenopathy. Thyroid unremarkable.  Palpable laryngeal landmarks. Full neck range of motion   Neurologic: CN II - XI intact. Normal gait    PROCEDURE: BILATERAL MICROSCOPY WITH CERUMEN DEBRIDEMENT    Using the microscope, both ears were examined. A right angle and alligator were used to debride the ears of cerumen revealing a clear and intact TM bilaterally. Patient tolerated the procedure well         Assessment and Plan:   Bilateral cerumen impactions  Bilateral ear canal dermatitis   R pulsatile tinnitus  Left SNHL  R mixed hearing loss   - Ears debrided as above  - R ear with mixed heairng loss and associated pulsatile tinnitus  - No effusion and type A tympanogram   - Will plan CTA head/ neck  - Pending findings, consider MRI    The patient was instructed to return to clinic if no improvement or progression of symptoms. Signs to watch out for reviewed.       MD Saloni Tolentino [facility]  [address] [place] [address] 6  [facility]  Office Phone: [phone]

## 2023-02-06 NOTE — PROGRESS NOTES
Ish Chambers, a 46y.o. year old male, was seen in ENT clinic today for a hearing evaluation on referral from Dr. Que Vizcarra. Patient complains of right-sided hearing loss and pulsatile tinnitus. No dizziness/imbalance reported. No recent audiogram.     Otoscopy: normal external ear canals and visible tympanic membranes, bilaterally. Tympanometry: RE Type A, normal  LE Type A, normal    SRT: RE Speech Reception Threshold (SRT) was obtained at 30 dBHL LE Speech Reception Threshold (SRT) was obtained at 10 dBHL    WRS: RE Excellent in quiet when words were presented at 70 dBHL. WRS: LE Good in quiet when words were presented at 50 dBHL. Pure tone audiometry:  RE: Moderate mixed hearing loss rising to mild at 3000Hz, sloping to severe sensorineural hearing loss  LE: WNL through 2000Hz, steeply sloping to moderate to severe sensorineural hearing loss    Sensorineural hearing loss, bilaterally    Impressions:  hearing loss requiring medical/otologic and audiologic follow-up  Discussed results of today's testing with patient. Patient has an asymmetrical hearing loss (R>L). Recommended follow-up with ENT to further evaluate asymmetry. Following medical clearance, patient can consider amplification. Discussed benefits of hearing aids and recommended hearing aid evaluation to discuss options once medical evaluation is complete. Patient indicated understanding. Provided tinnitus handout today. Plan:  Follow-up with ENT. Hearing aid/tinnitus masker evaluation recommended. Repeat audiogram 1 year or sooner if change is noted.     Denzel Bach   Doctor of Audiology

## 2023-02-06 NOTE — PROGRESS NOTES
Chief Complaint   Patient presents with    Follow-up    Wax in Ear       Visit Vitals  BP (!) 142/92 (BP 1 Location: Left lower arm, BP Patient Position: Sitting, BP Cuff Size: Adult)   Pulse (!) 104   Resp 17   Ht 5' 10\" (1.778 m)   Wt (!) 380 lb (172.4 kg)   SpO2 96%   BMI 54.52 kg/m²

## 2023-02-15 ENCOUNTER — HOSPITAL ENCOUNTER (OUTPATIENT)
Dept: CT IMAGING | Age: 53
Discharge: HOME | End: 2023-02-15
Attending: OTOLARYNGOLOGY
Payer: COMMERCIAL

## 2023-02-15 ENCOUNTER — TRANSCRIBE ORDER (OUTPATIENT)
Dept: REGISTRATION | Age: 53
End: 2023-02-15

## 2023-02-15 ENCOUNTER — HOSPITAL ENCOUNTER (OUTPATIENT)
Dept: LAB | Age: 53
Discharge: HOME | End: 2023-02-15
Attending: OTOLARYNGOLOGY
Payer: COMMERCIAL

## 2023-02-15 DIAGNOSIS — H90.A11 CONDUCTIVE HEARING LOSS OF RIGHT EAR WITH RESTRICTED HEARING OF LEFT EAR: ICD-10-CM

## 2023-02-15 DIAGNOSIS — H93.A1 PULSATILE TINNITUS OF RIGHT EAR: ICD-10-CM

## 2023-02-15 DIAGNOSIS — H93.A1 PULSATILE TINNITUS OF RIGHT EAR: Primary | ICD-10-CM

## 2023-02-15 LAB — CREAT SERPL-MCNC: 1.25 MG/DL (ref 0.7–1.3)

## 2023-02-15 PROCEDURE — 82565 ASSAY OF CREATININE: CPT

## 2023-02-15 PROCEDURE — 74011000636 HC RX REV CODE- 636: Performed by: OTOLARYNGOLOGY

## 2023-02-15 PROCEDURE — 70498 CT ANGIOGRAPHY NECK: CPT

## 2023-02-15 PROCEDURE — 36415 COLL VENOUS BLD VENIPUNCTURE: CPT

## 2023-02-15 RX ADMIN — IOPAMIDOL 100 ML: 755 INJECTION, SOLUTION INTRAVENOUS at 10:24

## 2023-02-20 ENCOUNTER — TELEPHONE (OUTPATIENT)
Dept: ENT CLINIC | Age: 53
End: 2023-02-20

## 2023-02-20 NOTE — TELEPHONE ENCOUNTER
Called and spoke with pt re: CTA result    He notes overall pulsatile tinitus has been better following ear debridement    Discussed consideration of MRI    AS things are better, will plan observation - let us know if worsens and consider MRI    Otherwise, would follow up audiogram in 1 year    MD [facility]  [facility] [address] [facility]  Office Phone: [phone]

## 2023-12-27 ENCOUNTER — TELEPHONE (OUTPATIENT)
Age: 53
End: 2023-12-27

## 2023-12-27 NOTE — TELEPHONE ENCOUNTER
Patient called today regarding up coming procedure. He has not heard from anyone with the details. He would like more information about his surgery. [phone]

## 2023-12-27 NOTE — TELEPHONE ENCOUNTER
Called patient to let hi know that I'm waiting on Dr. Parisa Chávez to send me a posting then I will schedule him for surgery.

## 2024-01-03 ENCOUNTER — TELEPHONE (OUTPATIENT)
Age: 54
End: 2024-01-03

## 2024-01-03 ENCOUNTER — PREP FOR PROCEDURE (OUTPATIENT)
Age: 54
End: 2024-01-03

## 2024-01-03 DIAGNOSIS — R19.05 PERIUMBILICAL MASS: ICD-10-CM

## 2024-01-04 ENCOUNTER — TELEPHONE (OUTPATIENT)
Age: 54
End: 2024-01-04

## 2024-01-04 NOTE — TELEPHONE ENCOUNTER
Caller Sue Rodriguez from [place] called stating that patient is not able to show up for PAT appt due to being out of town so surgery on 1/8/24 with Dr. Gonzalez needs to be rescheduled for surgery.

## 2024-01-04 NOTE — TELEPHONE ENCOUNTER
Patient called today regarding surgery next week. He was scheduled for PAT today but he is out of town on vacation. Please call him to reschedule his surgery. [phone].

## 2024-01-08 ENCOUNTER — TELEPHONE (OUTPATIENT)
Age: 54
End: 2024-01-08

## 2024-01-09 ENCOUNTER — HOSPITAL ENCOUNTER (OUTPATIENT)
Facility: HOSPITAL | Age: 54
Discharge: HOME | End: 2024-01-12

## 2024-01-10 ENCOUNTER — TELEPHONE (OUTPATIENT)
Age: 54
End: 2024-01-10

## 2024-01-10 NOTE — TELEPHONE ENCOUNTER
Bean Gonzales Jr. Called in due to the pre-op appointment yesterday, he stated that the anesthetist recommended him to come to [facility] instead of [place]. So he needs to cancel his surgery on 1/15 and he will call back to reschedule.

## 2024-01-10 NOTE — TELEPHONE ENCOUNTER
Spoke with patient and he decided he would like to wait on surgery as of now and would give a call back.

## 2025-02-22 ENCOUNTER — HOSPITAL ENCOUNTER (EMERGENCY)
Facility: HOSPITAL | Age: 55
Discharge: HOME | End: 2025-02-22
Attending: EMERGENCY MEDICINE
Payer: COMMERCIAL

## 2025-02-22 VITALS
TEMPERATURE: 97.1 F | DIASTOLIC BLOOD PRESSURE: 92 MMHG | HEIGHT: 70 IN | SYSTOLIC BLOOD PRESSURE: 150 MMHG | HEART RATE: 94 BPM | OXYGEN SATURATION: 95 % | RESPIRATION RATE: 18 BRPM | WEIGHT: 315 LBS | BODY MASS INDEX: 45.1 KG/M2

## 2025-02-22 DIAGNOSIS — S61.411A LACERATION OF RIGHT HAND WITHOUT FOREIGN BODY, INITIAL ENCOUNTER: Primary | ICD-10-CM

## 2025-02-22 PROCEDURE — 6360000002 HC RX W HCPCS: Performed by: EMERGENCY MEDICINE

## 2025-02-22 PROCEDURE — 90471 IMMUNIZATION ADMIN: CPT | Performed by: EMERGENCY MEDICINE

## 2025-02-22 PROCEDURE — 12001 RPR S/N/AX/GEN/TRNK 2.5CM/<: CPT

## 2025-02-22 PROCEDURE — 90714 TD VACC NO PRESV 7 YRS+ IM: CPT | Performed by: EMERGENCY MEDICINE

## 2025-02-22 PROCEDURE — 99284 EMERGENCY DEPT VISIT MOD MDM: CPT

## 2025-02-22 RX ORDER — LIDOCAINE HYDROCHLORIDE 10 MG/ML
10 INJECTION, SOLUTION EPIDURAL; INFILTRATION; INTRACAUDAL; PERINEURAL
Status: COMPLETED | OUTPATIENT
Start: 2025-02-22 | End: 2025-02-22

## 2025-02-22 RX ADMIN — LIDOCAINE HYDROCHLORIDE 10 ML: 10 INJECTION, SOLUTION EPIDURAL; INFILTRATION; INTRACAUDAL; PERINEURAL at 19:28

## 2025-02-22 RX ADMIN — CLOSTRIDIUM TETANI TOXOID ANTIGEN (FORMALDEHYDE INACTIVATED) AND CORYNEBACTERIUM DIPHTHERIAE TOXOID ANTIGEN (FORMALDEHYDE INACTIVATED) 0.5 ML: 5; 2 INJECTION, SUSPENSION INTRAMUSCULAR at 19:28

## 2025-02-22 ASSESSMENT — PAIN - FUNCTIONAL ASSESSMENT
PAIN_FUNCTIONAL_ASSESSMENT: NONE - DENIES PAIN
PAIN_FUNCTIONAL_ASSESSMENT: 0-10
PAIN_FUNCTIONAL_ASSESSMENT: ACTIVITIES ARE NOT PREVENTED

## 2025-02-22 ASSESSMENT — LIFESTYLE VARIABLES
HOW OFTEN DO YOU HAVE A DRINK CONTAINING ALCOHOL: NEVER
HOW MANY STANDARD DRINKS CONTAINING ALCOHOL DO YOU HAVE ON A TYPICAL DAY: PATIENT DOES NOT DRINK

## 2025-02-22 ASSESSMENT — PAIN SCALES - GENERAL: PAINLEVEL_OUTOF10: 0

## 2025-02-22 NOTE — ED TRIAGE NOTES
Patient presents for complaint of 2 lacerations to R hand (thumb and palm).  Not up to date on tetanus immunization.  Bleeding controlled in triage.  Patient had a fall, and went to catch himself, but cut his hand on glass surface.

## 2025-02-23 NOTE — DISCHARGE INSTRUCTIONS
Return to the emergency room for any purulent drainage, worsening pain, or any other new or concerning symptoms.

## 2025-02-23 NOTE — ED PROVIDER NOTES
[facility] EMERGENCY DEPT  EMERGENCY DEPARTMENT HISTORY AND PHYSICAL EXAM      Date: 2/22/2025  Patient Name: Bean Gonzales Jr.  MRN: [id number]  Birthdate 1970  Date of evaluation: 2/22/2025  Provider: Omi Ramos MD   Note Started: 7:15 PM EST 2/22/25    HISTORY OF PRESENT ILLNESS     Chief Complaint   Patient presents with    Hand Laceration       History Provided By: patient    HPI: Bean Gonzales Jr. is a 54 y.o. male with PMH HTN presenting with hand laceration. Fell on glass and cut R hand at thumb and base of palm.    PAST MEDICAL HISTORY   Past Medical History:  Past Medical History:   Diagnosis Date    Asthma     High cholesterol     Hypertension     Sleep apnea        Past Surgical History:  Past Surgical History:   Procedure Laterality Date    HERNIA REPAIR      HX OPEN REDUCTION INTERNAL FIXATION      rt leg    TONSILLECTOMY         Family History:  Family History   Problem Relation Age of Onset    Fibromyalgia Mother     Rheum Arthritis Mother        Social History:  Social History     Tobacco Use    Smoking status: Never     Passive exposure: Never    Smokeless tobacco: Current   Vaping Use    Vaping status: Never Used   Substance Use Topics    Alcohol use: Not Currently     Comment: socially    Drug use: Never       Allergies:  Allergies   Allergen Reactions    Penicillins      Other reaction(s): Unknown (comments)       PCP: John Oneil MD    Current Meds:   Current Facility-Administered Medications   Medication Dose Route Frequency Provider Last Rate Last Admin    lidocaine PF 1 % injection 10 mL  10 mL IntraDERmal NOW Omi Ramos MD        tetanus & diphtheria toxoids (adult) 5-2 LFU injection 0.5 mL  0.5 mL IntraMUSCular Once Omi Ramos MD         Current Outpatient Medications   Medication Sig Dispense Refill    TRELEGY ELLIPTA 200-62.5-25 MCG/ACT AEPB inhaler Inhale 1 puff into the lungs daily      albuterol sulfate HFA (PROVENTIL;VENTOLIN;PROAIR) 108 (90 Base) MCG/ACT inhaler TAKE